# Patient Record
Sex: FEMALE | Race: WHITE
[De-identification: names, ages, dates, MRNs, and addresses within clinical notes are randomized per-mention and may not be internally consistent; named-entity substitution may affect disease eponyms.]

---

## 2017-01-18 ENCOUNTER — HOSPITAL ENCOUNTER (OUTPATIENT)
Age: 72
Discharge: HOME | End: 2017-01-18
Payer: MEDICARE

## 2017-01-18 DIAGNOSIS — Z96.641: ICD-10-CM

## 2017-01-18 DIAGNOSIS — M25.551: Primary | ICD-10-CM

## 2017-03-27 ENCOUNTER — HOSPITAL ENCOUNTER (OUTPATIENT)
Age: 72
Discharge: HOME | End: 2017-03-27
Payer: MEDICARE

## 2017-03-27 DIAGNOSIS — E53.8: Primary | ICD-10-CM

## 2017-03-27 DIAGNOSIS — Z79.899: ICD-10-CM

## 2017-05-23 ENCOUNTER — HOSPITAL ENCOUNTER (OUTPATIENT)
Dept: HOSPITAL 76 - DI.S | Age: 72
Discharge: HOME | End: 2017-05-23
Attending: PODIATRIST
Payer: MEDICARE

## 2017-05-23 DIAGNOSIS — M19.071: Primary | ICD-10-CM

## 2017-05-24 NOTE — XRAY REPORT
RIGHT FOOT, THREE VIEWS:  05/23/2017

 

CLINICAL HISTORY:  Painful right foot.

 

COMPARISON:  None. 

 

FINDINGS:  Prominent bunion deformity is noted at the right 1st MP joint.  Significant narrowing of t
he right 1st MP joint is seen.  Mild spurring is noted along the periarticular margins of the lateral
 aspect of the right 1st MP joint.  Mild narrowing of the proximal and distal interphalangeal joints 
of the 2nd, 3rd, 4th, and 5th toes. 

 

No fracture is seen.

 

Tarsal bones appear normal. 

 

IMPRESSION:  

1.  SIGNIFICANT OSTEOARTHRITIS IS NOTED AT THE RIGHT 1ST MP JOINT WITH ASSOCIATED BUNION DEFORMITY.

 

2.  MINIMAL DEFORMITY IS NOTED ALONG THE LATERAL ASPECT OF THE BASE OF THE DISTAL PHALANX OF THE 1ST 
TOE.  FINDING MOST LIKELY IS A RESULT OF HEALED OLD TRAUMA. 

 

 

 

DD:05/24/2017 08:57:33  DT: 05/24/2017 09:13  JOB #: [id number]  EXT JOB #:[id number]

## 2017-06-01 ENCOUNTER — HOSPITAL ENCOUNTER (EMERGENCY)
Dept: HOSPITAL 76 - ED | Age: 72
Discharge: HOME | End: 2017-06-01
Payer: MEDICARE

## 2017-06-01 VITALS — DIASTOLIC BLOOD PRESSURE: 69 MMHG | SYSTOLIC BLOOD PRESSURE: 130 MMHG

## 2017-06-01 DIAGNOSIS — S62.367A: Primary | ICD-10-CM
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
## 2017-06-01 DIAGNOSIS — S60.222A: ICD-10-CM

## 2017-06-01 DIAGNOSIS — G20: ICD-10-CM

## 2017-06-01 DIAGNOSIS — I10: ICD-10-CM

## 2017-06-01 DIAGNOSIS — S62.645A: ICD-10-CM

## 2017-06-01 DIAGNOSIS — Z86.718: ICD-10-CM

## 2017-06-01 DIAGNOSIS — Z87.891: ICD-10-CM

## 2017-06-01 PROCEDURE — 29125 APPL SHORT ARM SPLINT STATIC: CPT

## 2017-06-01 PROCEDURE — 99283 EMERGENCY DEPT VISIT LOW MDM: CPT

## 2017-06-01 RX ADMIN — IBUPROFEN STA MG: 600 TABLET, FILM COATED ORAL at 18:24

## 2017-06-01 NOTE — XRAY PRELIMINARY REPORT
Accession: [id number]

Exam: XR Hand 3 View LT

 

IMPRESSION: Osteopenia with nondisplaced transverse fractures at the distal metaphysis of the fifth m
etacarpal as well as the proximal phalanx of the left fourth digit.

 

[facility]

 

SITE ID: [id number]

## 2017-06-01 NOTE — XRAY REPORT
EXAM:

LEFT HAND RADIOGRAPHY

 

EXAM DATE: 6/1/2017 04:21 PM.

 

CLINICAL HISTORY: Pain following fall today.

 

COMPARISON: None.

 

TECHNIQUE: 3 views.

 

FINDINGS: 

Bones: Diffuse osteopenia with a transverse fracture at the distal metaphysis of the fifth metacarpal
. Additional nondisplaced transverse fracture base proximal phalanx left fourth digit.

 

Joints: No dislocation. Osteophytes at the first carpometacarpal joint.

 

Soft Tissues: Mild soft tissue swelling.

 

IMPRESSION: Osteopenia with nondisplaced transverse fractures at the distal metaphysis of the fifth m
etacarpal as well as the proximal phalanx of the left fourth digit.

 

[facility]

Referring Provider Line: [phone]

 

SITE ID: [id number]

## 2017-06-01 NOTE — ED PHYSICIAN DOCUMENTATION
PD HPI UPPER EXT INJURY





- Stated complaint


Stated Complaint: L HAND INJURY





- Chief complaint


Chief Complaint: General





- History obtained from


History obtained from: Patient





- History of Present Illness


Location: Left, Hand


Type of injury: Fall (she says she slipped and fell. DId not feel badly prior.)


Timing - onset: Today


Timing - details: Abrupt onset, Still present


Improved by: Rest


Worsened by: Moving, Palpating


Associated symptoms: Swelling (ulnar side of hand).  No: Weakness, Numbness


Similar symptoms before: Has not had sx before


Recently seen: Not recently seen





Review of Systems


Cardiac: denies: Chest pain / pressure


GI: denies: Abdominal Pain


Skin: denies: Abrasion (s), Laceration (s)


Neurologic: denies: Focal weakness, Numbness, Altered mental status, Headache, 

Head injury





PD PAST MEDICAL HISTORY





- Past Medical History


Cardiovascular: Hypertension, Deep vein thrombosis


Respiratory: None


Neuro: Parkinson's


Endocrine/Autoimmune: None


GI: None


: None


HEENT: None


Psych: None


Musculoskeletal: Osteoporosis, Chronic back pain


Derm: Other





- Past Surgical History


Past Surgical History: Yes


Ortho: Hip replacement, Rotator cuff repair





- Present Medications


Home Medications: 


 Ambulatory Orders











 Medication  Instructions  Recorded  Confirmed


 


Carbidopa/Levodopa 25/100 [Sinemet 2 tab PO TID 03/18/15 06/01/17





25 mg/100 mg]   


 


Citalopram [CeleXA] 20 mg PO DAILY 03/18/15 06/01/17


 


Metoprolol Succinate [Toprol Xl] 100 mg PO DAILY 03/18/15 06/01/17


 


Oxybutynin Chloride [Ditropan Xl] 10 mg PO DAILY 03/18/15 06/01/17


 


Calcium Citrate 200 mg PO TID 11/16/16 06/01/17


 


Polyethylene Glycol 3350 [Miralax] 17 gm PO DAILY 11/16/16 06/01/17


 


rOPINIRole [Requip] 2 mg PO TID 11/16/16 06/01/17


 


Rivastigmine [Exelon] 1 cap PO TID 06/01/17 06/01/17














- Allergies


Allergies/Adverse Reactions: 


 Allergies











Allergy/AdvReac Type Severity Reaction Status Date / Time


 


No Known Drug Allergies Allergy   Verified 06/01/17 16:11














- Social History


Does the pt smoke?: No


Smoking Status: Former smoker


Does the pt drink ETOH?: No


Does the pt have substance abuse?: Yes





- Immunizations


Immunizations are current?: Yes





- POLST


Patient has POLST: No





PD ED PE NORMAL





- Vitals


Vital signs reviewed: Yes





- General


General: Alert and oriented X 3, No acute distress, Well developed/nourished





- HEENT


HEENT: Atraumatic





- Neck


Neck: Supple, no meningeal sign, No bony TTP





- Respiratory


Respiratory: Clear bilaterally





- Abdomen


Abdomen: Soft, Non tender





- Derm


Derm: Normal color, Warm and dry





- Extremities


Extremities: Other (left hand with swelling, bruising and tenderness overlying 

the 4th/5th MC and at MCP. Little finger also some tender but no deformity. )





- Neuro


Neuro: No motor deficit, No sensory deficit





Results





- Vitals


Vitals: 


 Oxygen











O2 Source [With Activity]      Room air


 


O2 Source [Without Activity]   Room air


 


O2 Source                      Room air

















- Rads (name of study)


  ** hand 


Radiology: Prelim report reviewed (5th MC fracture without angulation)





Procedures





- Splint (location)


  ** hand


Splint applied by: Tech


Type of splint: Fiberglass, Ulnar gutter


Other: Patient tolerated well, No complications, Neurovascular intact, Good 

alignment, Sling provided





PD MEDICAL DECISION MAKING





- ED course


Complexity details: reviewed results, considered differential, d/w patient





Departure





- Departure


Disposition: 01 Home, Self Care


Clinical Impression: 


Fall from slip, trip, or stumble


Qualifiers:


 Encounter type: initial encounter Qualified Code(s): W01.0XXA - Fall on same 

level from slipping, tripping and stumbling without subsequent striking against 

object, initial encounter





Hand contusion


Qualifiers:


 Encounter type: initial encounter Laterality: left Qualified Code(s): S60.222A 

- Contusion of left hand, initial encounter





Fracture of fifth metacarpal bone of left hand


Qualifiers:


 Encounter type: initial encounter Fracture type: closed Metacarpal location: 

neck Fracture alignment: nondisplaced Qualified Code(s): S62.367A - 

Nondisplaced fracture of neck of fifth metacarpal bone, left hand, initial 

encounter for closed fracture


Condition: Stable


Record reviewed to determine appropriate education?: Yes


Instructions:  ED Fx Boxer


Follow-Up: 


Salome Hoffmann PA-C [Primary Care Provider] - 


Frederick Narvaez MD [Provider Admit Priv/Credential] - 


Comments: 


Ibuprofen 400-600 mg three times daily. Add Tylenol as needed for pains. Splint 

for the hand for likely 4-6 weeks, but it will need to be changed/replaced once 

the swelling goes down in 4-5 days. Call for an appt with PMD or Ortho. Elevate 

and rest hand often to reduce the swelling. 


Discharge Date/Time: 06/01/17 18:50

## 2018-04-16 ENCOUNTER — HOSPITAL ENCOUNTER (OUTPATIENT)
Dept: HOSPITAL 76 - DI | Age: 73
Discharge: HOME | End: 2018-04-16
Attending: INTERNAL MEDICINE
Payer: MEDICARE

## 2018-04-16 DIAGNOSIS — Z96.641: ICD-10-CM

## 2018-04-16 DIAGNOSIS — J44.9: Primary | ICD-10-CM

## 2018-04-16 DIAGNOSIS — M25.551: ICD-10-CM

## 2018-04-16 PROCEDURE — 71046 X-RAY EXAM CHEST 2 VIEWS: CPT

## 2018-04-17 NOTE — XRAY REPORT
RIGHT HIP AND PELVIS:  04/16/2018

 

CLINICAL INDICATION:  Right hip pain.

 

COMPARISON:  01/18/2017.

 

FINDINGS:  Frontal view of the hips and pelvis and frogleg lateral view of the right hip 

demonstrate a total hip replacement in place.  There is no evidence of fracture or hardware 

complication.  No radiopaque foreign body is seen in the soft tissues.

 

IMPRESSION:  STABLE APPEARANCE OF RIGHT HIP REPLACEMENT.

 

 

DD: 04/17/2018 08:44

TD: 04/17/2018 09:31

Job #: [id number]

## 2018-04-17 NOTE — XRAY REPORT
TWO VIEW CHEST:  04/16/2018

 

CLINICAL INDICATION:  Cough for 1 month.

 

COMPARISON:  11/16/2016.

 

FINDINGS:  Frontal and lateral views of the chest demonstrate a normal cardiac silhouette.  The

lungs are hyperinflated, compatible with COPD.  No focal consolidation, effusion, or 

pneumothorax is present.

 

IMPRESSION:  COPD, BUT NO EVIDENCE OF ACUTE CARDIOPULMONARY DISEASE.

 

 

DD: 04/17/2018 08:43

TD: 04/17/2018 09:30

Job #: [id number]

## 2018-07-10 ENCOUNTER — HOSPITAL ENCOUNTER (EMERGENCY)
Dept: HOSPITAL 76 - ED | Age: 73
LOS: 1 days | Discharge: HOME | End: 2018-07-11
Payer: MEDICARE

## 2018-07-10 DIAGNOSIS — Z96.649: ICD-10-CM

## 2018-07-10 DIAGNOSIS — Z87.891: ICD-10-CM
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
## 2018-07-10 DIAGNOSIS — S40.019A: Primary | ICD-10-CM

## 2018-07-10 DIAGNOSIS — Z86.718: ICD-10-CM

## 2018-07-10 DIAGNOSIS — S20.229A: ICD-10-CM

## 2018-07-10 DIAGNOSIS — I10: ICD-10-CM

## 2018-07-10 PROCEDURE — 73000 X-RAY EXAM OF COLLAR BONE: CPT

## 2018-07-10 PROCEDURE — 73030 X-RAY EXAM OF SHOULDER: CPT

## 2018-07-10 PROCEDURE — 72072 X-RAY EXAM THORAC SPINE 3VWS: CPT

## 2018-07-10 PROCEDURE — 99283 EMERGENCY DEPT VISIT LOW MDM: CPT

## 2018-07-10 PROCEDURE — 71046 X-RAY EXAM CHEST 2 VIEWS: CPT

## 2018-07-10 NOTE — XRAY REPORT
Procedure Date:  07/10/2018   

Accession Number:  [id number] / [id number]                    

Procedure:  XR  - Shoulder 3 View RT CPT Code:  

 

FULL RESULT:

 

 

EXAM:

RIGHT SHOULDER RADIOGRAPHY

 

EXAM DATE: 7/10/2018 10:47 PM.

 

CLINICAL HISTORY: Fall. Right shoulder pain.

 

COMPARISON: None.

 

TECHNIQUE: 3 views.

 

FINDINGS:

Bones: Normal. No fracture or bone lesion.

 

Joints: The glenohumeral and acromioclavicular joints are normal.

 

Soft tissues: The visualized hemithorax is unremarkable. No soft tissue 

calcification.

IMPRESSION: Normal shoulder radiography.

 

[facility]

## 2018-07-10 NOTE — XRAY REPORT
Procedure Date:  07/10/2018   

Accession Number:  [id number] / [id number]                    

Procedure:  XR  - Clavicle RT CPT Code:  

 

FULL RESULT:

 

 

EXAM:

RIGHT CLAVICLE RADIOGRAPHY

 

EXAM DATE: 7/10/2018 10:47 PM.

 

CLINICAL HISTORY: Fall. Right shoulder pain.

 

COMPARISON: None.

 

TECHNIQUE: 2 views.

 

FINDINGS:

Bones: Normal. No fracture or bone lesion.

 

Joints: The acromioclavicular and sternoclavicular joints are normal. No 

subluxation.

 

Soft Tissues: Normal. No soft tissue calcification.

IMPRESSION: Normal clavicle radiography.

 

[facility]

## 2018-07-11 VITALS — SYSTOLIC BLOOD PRESSURE: 120 MMHG | DIASTOLIC BLOOD PRESSURE: 66 MMHG

## 2018-07-11 NOTE — XRAY REPORT
Procedure Date:  07/11/2018   

Accession Number:  [id number] / [id number]                    

Procedure:  XR  - Thoracic Spine 3 View CPT Code:  

 

FULL RESULT:

 

 

EXAM:

THORACIC SPINE RADIOGRAPHY

 

EXAM DATE: 7/11/2018 12:38 AM.

 

CLINICAL HISTORY: Fall, back contusion.

 

COMPARISON: CHEST 2 VIEW 04/16/2018.

 

TECHNIQUE: 3 views.

 

FINDINGS:

Alignment: Normal. No spondylolisthesis or scoliosis.

 

Bones: Osteopenia. No fractures or bone lesions.

 

Disks: Mild diffuse degenerative changes.

 

Soft Tissues: Normal. The visualized lungs and cardiomediastinal 

silhouette are normal.

IMPRESSION:

Negative thoracic spine in this mildly osteopenic patient. If there is 

continued clinical concern, CT or MRI suggested.

 

[facility]

## 2018-07-11 NOTE — ED PHYSICIAN DOCUMENTATION
History of Present Illness





- Stated complaint


Stated Complaint: GLF/RT SHOULDER/COLLAR BONE INJ





- Chief complaint


Chief Complaint: Trauma Ext





- History obtained from


History obtained from: Patient, Family





- History of Present Illness


Timing: Today





- Additonal information


Additional information: 





72-year-old female presents the emergency department with complaints of a 

shoulder injury and upper back injury after falling while getting out of the 

shower.  The patient denies injury to her head, neck, abdomen or lower 

extremities.  Symptoms are described as moderate.  No other associated 

symptoms.  No relieving factors.  No attempts at symptom management.  The 

patient denies syncope, chest pain or palpitations before the event or after 

the event.





Review of Systems


Constitutional: denies: Fever


Eyes: denies: Decreased vision


Ears: denies: Ear pain


Nose: denies: Congestion


Cardiac: denies: Chest pain / pressure


Respiratory: denies: Dyspnea


GI: denies: Abdominal Pain


Skin: denies: Laceration (s)


Musculoskeletal: reports: Back pain, Joint pain, Joint swelling


Neurologic: denies: Generalized weakness, Syncope, Head injury





PD PAST MEDICAL HISTORY





- Past Medical History


Cardiovascular: Hypertension, Deep vein thrombosis


Respiratory: None


Endocrine/Autoimmune: None


GI: None


: None


HEENT: None


Psych: None


Musculoskeletal: Osteoporosis, Chronic back pain


Derm: Other





- Past Surgical History


Past Surgical History: Yes


Ortho: Hip replacement, Rotator cuff repair





- Present Medications


Home Medications: 


 Ambulatory Orders











 Medication  Instructions  Recorded  Confirmed


 


Carbidopa/Levodopa 25/100 [Sinemet 2 tab PO TID 03/18/15 06/01/17





25 mg/100 mg]   


 


Citalopram [CeleXA] 20 mg PO DAILY 03/18/15 06/01/17


 


Metoprolol Succinate [Toprol Xl] 100 mg PO DAILY 03/18/15 06/01/17


 


Oxybutynin Chloride [Ditropan Xl] 10 mg PO DAILY 03/18/15 06/01/17


 


Calcium Citrate 200 mg PO TID 11/16/16 06/01/17


 


Polyethylene Glycol 3350 [Miralax] 17 gm PO DAILY 11/16/16 06/01/17


 


rOPINIRole [Requip] 2 mg PO TID 11/16/16 06/01/17


 


Rivastigmine [Exelon] 1 cap PO TID 06/01/17 06/01/17














- Allergies


Allergies/Adverse Reactions: 


 Allergies











Allergy/AdvReac Type Severity Reaction Status Date / Time


 


No Known Drug Allergies Allergy   Verified 06/01/17 16:11














- Social History


Does the pt smoke?: No


Smoking Status: Former smoker


Does the pt drink ETOH?: No


Does the pt have substance abuse?: Yes





- Immunizations


Immunizations are current?: Yes





- POLST


Patient has POLST: No





PD ED PE NORMAL





- General


General: Alert and oriented X 3, No acute distress





- HEENT


HEENT: Atraumatic, PERRL, EOMI, Ears normal





- Neck


Neck: Supple, no meningeal sign





- Cardiac


Cardiac: RRR, Strong equal pulses





- Respiratory


Respiratory: No respiratory distress, Clear bilaterally





- Extremities


Extremities: No deformity





- Neuro


Neuro: Alert and oriented X 3, No motor deficit, Normal speech


Eye Opening: Spontaneous


Motor: Obeys Commands


Verbal: Oriented


GCS Score: 15





- Psych


Psych: Normal mood





PD ED PE EXPANDED





- Back


Back: Soft tissue tenderness.  No: Limited ROM


Back visual: 


  __________________________














  __________________________





 1 - bruising








- Extremities


Extremities: Tenderness, Right shoulder (There is no evidence of deformity, 

crepitus, contusion or obvious dislocation.  No laceration)





Results





- Vitals


Vitals: 


 Vital Signs - 24 hr











  07/10/18 07/11/18 07/11/18





  21:35 00:00 01:36


 


Temperature 36.3 C L  36.5 C


 


Heart Rate 66 82 81


 


Respiratory 18 17 15





Rate   


 


Blood Pressure 159/71 H 136/74 H 120/66


 


O2 Saturation 96 99 97








 Oxygen











O2 Source [With Activity]      Room air


 


O2 Source [Without Activity]   Room air


 


O2 Source                      Room air

















- Rads (name of study)


  ** Chest x-ray/shoulder x-ray/clavicle/thoracic spine


Radiology: Final report received, Other (No fracture identified on any of the x-

rays)





PD MEDICAL DECISION MAKING





- ED course


ED course: 





The patient has no acute fracture seen on her imaging, the patient appears 

appropriate for discharge home in ongoing outpatient management.  I discussed 

with her the findings and plan she understands and agrees.  I discussed warning 

signs and recommended returning to the emergency department immediately for 

worsening or any concerns.





- Sepsis Event


Vital Signs: 


 Vital Signs - 24 hr











  07/10/18 07/11/18 07/11/18





  21:35 00:00 01:36


 


Temperature 36.3 C L  36.5 C


 


Heart Rate 66 82 81


 


Respiratory 18 17 15





Rate   


 


Blood Pressure 159/71 H 136/74 H 120/66


 


O2 Saturation 96 99 97








 Oxygen











O2 Source [With Activity]      Room air


 


O2 Source [Without Activity]   Room air


 


O2 Source                      Room air

















Departure





- Departure


Disposition: 01 Home, Self Care


Clinical Impression: 


Shoulder contusion


Qualifiers:


 Encounter type: initial encounter Laterality: unspecified laterality Qualified 

Code(s): S40.019A - Contusion of unspecified shoulder, initial encounter





Back contusion


Qualifiers:


 Encounter type: initial encounter Laterality: unspecified laterality Qualified 

Code(s): S20.229A - Contusion of unspecified back wall of thorax, initial 

encounter





Condition: Good


Instructions:  ED Contusion Back, ED Contusion Upper Ext, ED Wound Care


Follow-Up: 


Dudley Carpio MD [Primary Care Provider] - Within 1 week


Comments: 


Please return to the emergency department for worsening symptoms or any concerns


Discharge Date/Time: 07/11/18 01:36

## 2018-07-11 NOTE — XRAY REPORT
Procedure Date:  07/11/2018   

Accession Number:  [id number] / [id number]                    

Procedure:  XR  - Chest 2 View X-Ray CPT Code:  51354

 

FULL RESULT:

 

 

EXAM:

CHEST RADIOGRAPHY

 

EXAM DATE: 7/11/2018 12:37 AM.

 

CLINICAL HISTORY: Fall, back contusion.

 

COMPARISON: CLAVICLE RT 07/10/2018, THORACIC SPINE 3 VIEW 07/11/2018, 

CHEST 2 VIEW 04/16/2018.

 

TECHNIQUE: 2 views.

 

FINDINGS:

Lungs/Pleura: Large volumes. No focal pneumonia or overt edema. No 

pneumothorax or effusion.

 

Mediastinum: Within exam limitations, cardiomediastinal contour is normal.

 

Other: No definite acute osseous abnormality seen.

 

IMPRESSION:

COPD without acute process seen in the chest.

 

[facility]

## 2018-07-24 ENCOUNTER — HOSPITAL ENCOUNTER (OUTPATIENT)
Dept: HOSPITAL 76 - DI | Age: 73
Discharge: HOME | End: 2018-07-24
Attending: PHYSICIAN ASSISTANT
Payer: MEDICARE

## 2018-07-24 DIAGNOSIS — M19.011: ICD-10-CM

## 2018-07-24 DIAGNOSIS — S46.111A: ICD-10-CM

## 2018-07-24 DIAGNOSIS — S46.011A: Primary | ICD-10-CM

## 2018-07-25 NOTE — MRI REPORT
Procedure Date:  07/24/2018   

Accession Number:  [id number] / [id number]                    

Procedure:  MRI - Shoulder RT W/O CPT Code:  

 

FULL RESULT:

 

 

EXAM:

RIGHT SHOULDER MRI WITHOUT CONTRAST

 

EXAM DATE: 7/24/2018 03:10 PM.

 

CLINICAL HISTORY: SHOULDER JOINT PAIN, RIGHT, FALL W/TRAUMA 7-10-18.

 

COMPARISON: 07/10/2018 right shoulder radiographs.

 

TECHNIQUE: Multiplanar, multisequence T1-weighted and fluid-sensitive 

sequences of the shoulder without contrast. Other: None.

 

FINDINGS:

Acromioclavicular Region: The acromion is type II. Mild acromioclavicular 

joint degenerative changes with subchondral cystic change and capsular 

hypertrophy. The coracoacromial and coracoclavicular ligaments are 

intact. There is fluid within the subacromial-subdeltoid bursa.

 

Glenohumeral Region: There is cranial subluxation of the humeral head on 

the glenoid with narrowing of the subacromial space. Moderate effusion 

with synovitis. The articular cartilage is unremarkable. The glenohumeral 

ligaments and joint capsule are unremarkable.

 

Bone Marrow: There is cystic change within the greater tuberosity of the 

humerus. Marrow signal is otherwise normal. No osseous lesions. No 

fractures.

 

Labrum: The labrum is unremarkable on this nonarthrographic study.

 

Musculature/Rotator Cuff: There is a full-thickness, full width tear of 

the infraspinatus tendon with retraction medial to the supraglenoid 

tubercle and associated intramuscular edema. There is also a 

full-thickness, near full width tear of the supraspinatus tendon, with 

remaining intact anterior fibers and retraction of the torn tendon fibers 

to the level of the supraglenoid tubercle. There is shallow 

partial-thickness articular sided tearing of the cranial fibers of the 

subscapularis tendon. The teres minor tendon is intact but demonstrates 

mild intramuscular edema. No atrophy.

 

Biceps Tendon: There is a longitudinal split tear of the intra-articular 

and extra-articular long head biceps tendon.

 

Other: The subcutaneous tissues are unremarkable.

IMPRESSION:

1. Full-thickness, full width tear of the infraspinatus tendon and 

full-thickness, near full width tear of the supraspinatus tendon with 

retraction to the level of the supraglenoid tubercle and associated 

intramuscular edema.

2. Shallow partial-thickness articular sided tear of the cranial fibers 

of the subscapularis tendon.

3. Longitudinal split tear of the biceps tendon.

4. Mild acromioclavicular joint osteoarthritis.

 

[facility] MUSCULOSKELETAL RADIOLOGY SECTION

## 2018-11-06 ENCOUNTER — HOSPITAL ENCOUNTER (OUTPATIENT)
Dept: HOSPITAL 76 - DI | Age: 73
Discharge: HOME | End: 2018-11-06
Attending: PSYCHIATRY & NEUROLOGY
Payer: MEDICARE

## 2018-11-06 DIAGNOSIS — G20: Primary | ICD-10-CM

## 2018-11-06 DIAGNOSIS — R41.3: ICD-10-CM

## 2018-11-06 PROCEDURE — 70450 CT HEAD/BRAIN W/O DYE: CPT

## 2018-11-06 NOTE — CT REPORT
Reason:  PARKINSONISM,UNSPECIFIED,MEMORY LOSS

Procedure Date:  11/06/2018   

Accession Number:  [id number] / [id number]                    

Procedure:  CT  - Head W/O CPT Code:  

 

FULL RESULT:

 

 

EXAM:

CT HEAD

 

EXAM DATE: 11/6/2018 02:55 PM.

 

CLINICAL HISTORY: PARKINSONISM,UNSPECIFIED,MEMORY LOSS.

 

COMPARISON: BRAIN W/WO 07/02/2014 1:01 PM.

 

TECHNIQUE: Multiaxial CT images were obtained from the foramen magnum to 

the vertex. Reformats: Sagittal and coronal. IV contrast: None.

 

In accordance with CT protocol optimization, one or more of the following 

dose reduction techniques were utilized for this exam: automated exposure 

control, adjustment of mA and/or KV based on patient size, or use of 

iterative reconstructive technique.

 

FINDINGS:

Parenchyma: No intraparenchymal hemorrhage. No evidence of mass, midline 

shift, or CT findings of infarction. Gray-white differentiation is 

distinct.

 

Extraaxial Spaces: Normal for age. No subdural or epidural collections 

identified.

 

Ventricles: Normal in size and position.

 

Sinuses and Orbits: Imaged paranasal sinuses, orbits, and mastoids show 

no significant abnormality.

 

Bones: No evidence of fracture or calvarial defect.

 

Other: None.

IMPRESSION: Stable negative head CT.

 

[facility]

## 2018-11-27 ENCOUNTER — HOSPITAL ENCOUNTER (EMERGENCY)
Dept: HOSPITAL 76 - ED | Age: 73
Discharge: HOME | End: 2018-11-27
Payer: MEDICARE

## 2018-11-27 VITALS — DIASTOLIC BLOOD PRESSURE: 68 MMHG | SYSTOLIC BLOOD PRESSURE: 165 MMHG

## 2018-11-27 DIAGNOSIS — M81.8: ICD-10-CM

## 2018-11-27 DIAGNOSIS — Z96.641: ICD-10-CM

## 2018-11-27 DIAGNOSIS — S70.01XA: Primary | ICD-10-CM
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
## 2018-11-27 DIAGNOSIS — Z86.718: ICD-10-CM

## 2018-11-27 DIAGNOSIS — I10: ICD-10-CM

## 2018-11-27 DIAGNOSIS — Z87.891: ICD-10-CM

## 2018-11-27 PROCEDURE — 99283 EMERGENCY DEPT VISIT LOW MDM: CPT

## 2018-11-27 NOTE — ED PHYSICIAN DOCUMENTATION
PD HPI LOWER EXT INJURY





- Stated complaint


Stated Complaint: HIP PX/GLF





- Chief complaint


Chief Complaint: Ext Problem





- History obtained from


History obtained from: Patient





- History of Present Illness


PD HPI LOW EXT INJURY LOCATION: Other (74yo woman with parkinsons fell 3 days 

ago, feet got caught onto something. Fell onto R hip which was replaced 3 years 

ago. Pain is severe with walking but mild when at rest.)





Review of Systems


Constitutional: reports: Reviewed and negative


Cardiac: reports: Reviewed and negative


Respiratory: reports: Reviewed and negative


GI: reports: Reviewed and negative


: reports: Reviewed and negative





PD PAST MEDICAL HISTORY





- Past Medical History


Cardiovascular: Hypertension, Deep vein thrombosis


Respiratory: None


Endocrine/Autoimmune: None


GI: None


: None


HEENT: None


Psych: None


Musculoskeletal: Osteoporosis, Chronic back pain


Derm: Other





- Past Surgical History


Past Surgical History: Yes


Ortho: Hip replacement, Rotator cuff repair





- Present Medications


Home Medications: 


                                Ambulatory Orders











 Medication  Instructions  Recorded  Confirmed


 


Carbidopa/Levodopa 25/100 [Sinemet 2 tab PO TID 03/18/15 11/27/18





25 mg/100 mg]   


 


Citalopram [CeleXA] 20 mg PO DAILY 03/18/15 11/27/18


 


Metoprolol Succinate [Toprol Xl] 100 mg PO DAILY 03/18/15 11/27/18


 


Oxybutynin Chloride [Ditropan Xl] 10 mg PO DAILY 03/18/15 11/27/18


 


Calcium Citrate 200 mg PO TID 11/16/16 11/27/18


 


Polyethylene Glycol 3350 [Miralax] 17 gm PO DAILY 11/16/16 11/27/18


 


rOPINIRole [Requip] 2 mg PO TID 11/16/16 11/27/18


 


Rivastigmine [Exelon] 1 cap PO TID 06/01/17 11/27/18














- Allergies


Allergies/Adverse Reactions: 


                                    Allergies











Allergy/AdvReac Type Severity Reaction Status Date / Time


 


No Known Drug Allergies Allergy   Verified 11/27/18 15:07














- Social History


Does the pt smoke?: No


Smoking Status: Former smoker


Does the pt drink ETOH?: No


Does the pt have substance abuse?: Yes





- Immunizations


Immunizations are current?: Yes





- POLST


Patient has POLST: No





PD ED PE NORMAL





- Vitals


Vital signs reviewed: Yes





- General


General: Alert and oriented X 3, No acute distress





- Derm


Derm: Normal color, Warm and dry





- Extremities


Extremities: No calf tenderness / cord, Other (She seems to walk and bear weight

 without overt tenderness, there is a seemingly subcutaneous lump just posterior

 to the greater trochanter that is tender, most consistent with a subcutaneous 

hematoma.)





- Neuro


Neuro: Alert and oriented X 3, Normal speech





Results





- Vitals


Vitals: 


                               Vital Signs - 24 hr











  11/27/18





  15:04


 


Temperature 36.9 C


 


Heart Rate 74


 


Respiratory 18





Rate 


 


Blood Pressure 160/84 H


 


O2 Saturation 100








                                     Oxygen











O2 Source [With Activity]      Room air


 


O2 Source [Without Activity]   Room air


 


O2 Source                      Room air

















- Rads (name of study)


  ** R hip XR


Radiology: EMP read contemporaneously (NAD)





Departure





- Departure


Disposition: 01 Home, Self Care


Clinical Impression: 


Fall from slip, trip, or stumble


Qualifiers:


 Encounter type: initial encounter Qualified Code(s): W01.0XXA - Fall on same 

level from slipping, tripping and stumbling without subsequent striking against 

object, initial encounter





Contusion of right hip


Qualifiers:


 Encounter type: initial encounter Qualified Code(s): S70.01XA - Contusion of 

right hip, initial encounter





Condition: Good


Record reviewed to determine appropriate education?: Yes


Instructions:  ED Contusion Hip


Comments: 


Your blood pressure was elevated today on check into the emergency department.  

This does not mean that you have hypertension, it is a common phenomenon to come

to the emergency department and have elevated blood pressure.  I recommend that 

you see your primary care physician within the week to have it rechecked when 

you are feeling better.

## 2018-11-27 NOTE — XRAY REPORT
Reason:  hip inj

Procedure Date:  11/27/2018   

Accession Number:  [id number] / [id number]                    

Procedure:  XR  - Hip w/Pelvis 2-3V RT CPT Code:  

 

FULL RESULT:

 

 

EXAM:

RIGHT HIP AND PELVIS RADIOGRAPHY

 

EXAM DATE: 11/27/2018 04:35 PM.

 

HISTORY: Hip inj.

 

COMPARISONS: 04/16/2018.

 

TECHNIQUE: 1 view of the pelvis and 1 view of the hip.

 

FINDINGS:

A right total hip prosthesis projects in similar position. No acute 

fracture. Heterotopic ossification adjacent to the right greater 

trochanter is unchanged. No dislocation. Moderate to severe lower lumbar 

disk degeneration.

IMPRESSION: No acute osseus abnormality.

 

[facility]

## 2018-12-05 ENCOUNTER — HOSPITAL ENCOUNTER (OUTPATIENT)
Dept: HOSPITAL 76 - DI | Age: 73
Discharge: HOME | End: 2018-12-05
Attending: PHYSICIAN ASSISTANT
Payer: MEDICARE

## 2018-12-05 DIAGNOSIS — M41.86: Primary | ICD-10-CM

## 2018-12-05 PROCEDURE — 72110 X-RAY EXAM L-2 SPINE 4/>VWS: CPT

## 2018-12-06 NOTE — XRAY REPORT
Reason:  BACK PAIN,LUMBAR,CHRONIC

Procedure Date:  12/05/2018   

Accession Number:  [id number] / [id number]                    

Procedure:  XR  - Lumbar Spine Complete CPT Code:  

 

FULL RESULT:

 

 

EXAM:

LUMBOSACRAL SPINE RADIOGRAPHY

 

EXAM DATE: 12/5/2018 04:51 PM.

 

CLINICAL HISTORY: Back pain, lumbar, chronic.

 

COMPARISONS: None.

 

TECHNIQUE: 5 views.

 

FINDINGS:

Alignment: Dextroconvex lumbar scoliosis centered about L3.

 

Bones: Five non-rib-bearing lumbar vertebral bodies are present. No 

fractures or bone lesions.

 

Disks: Multilevel loss of disk space height with marginal osteophytosis, 

most pronounced at L5-S1 and L2-L3.

 

Facets: L5 pars defect with severe facet arthropathy at this level.

 

Sacroiliac Joints: Unremarkable.

 

Soft Tissues: Normal. The visualized bowel gas pattern is normal.

IMPRESSION:

Degenerative scoliosis and L5 pars defect.

 

 

[facility]

## 2018-12-27 ENCOUNTER — HOSPITAL ENCOUNTER (OUTPATIENT)
Dept: HOSPITAL 76 - LAB.F | Age: 73
Discharge: HOME | End: 2018-12-27
Attending: PHYSICIAN ASSISTANT
Payer: MEDICARE

## 2018-12-27 DIAGNOSIS — M25.511: ICD-10-CM

## 2018-12-27 DIAGNOSIS — E78.2: ICD-10-CM

## 2018-12-27 DIAGNOSIS — I10: ICD-10-CM

## 2018-12-27 DIAGNOSIS — R73.9: ICD-10-CM

## 2018-12-27 DIAGNOSIS — G20: ICD-10-CM

## 2018-12-27 DIAGNOSIS — E53.8: Primary | ICD-10-CM

## 2018-12-27 DIAGNOSIS — R41.3: ICD-10-CM

## 2018-12-27 DIAGNOSIS — Z79.899: ICD-10-CM

## 2018-12-27 LAB
ALBUMIN DIAFP-MCNC: 3.9 G/DL (ref 3.2–5.5)
ALBUMIN/GLOB SERPL: 1.3 {RATIO} (ref 1–2.2)
ALP SERPL-CCNC: 103 IU/L (ref 42–121)
ALT SERPL W P-5'-P-CCNC: 11 IU/L (ref 10–60)
ANION GAP SERPL CALCULATED.4IONS-SCNC: 8 MMOL/L (ref 6–13)
AST SERPL W P-5'-P-CCNC: 17 IU/L (ref 10–42)
BASOPHILS NFR BLD AUTO: 0.2 10^3/UL (ref 0–0.1)
BASOPHILS NFR BLD AUTO: 2.1 %
BILIRUB BLD-MCNC: 0.7 MG/DL (ref 0.2–1)
BUN SERPL-MCNC: 13 MG/DL (ref 6–20)
CALCIUM UR-MCNC: 8.8 MG/DL (ref 8.5–10.3)
CHLORIDE SERPL-SCNC: 103 MMOL/L (ref 101–111)
CHOLEST SERPL-MCNC: 167 MG/DL
CO2 SERPL-SCNC: 28 MMOL/L (ref 21–32)
CREAT SERPLBLD-SCNC: 0.9 MG/DL (ref 0.4–1)
EOSINOPHIL # BLD AUTO: 0.2 10^3/UL (ref 0–0.7)
EOSINOPHIL NFR BLD AUTO: 1.9 %
ERYTHROCYTE [DISTWIDTH] IN BLOOD BY AUTOMATED COUNT: 15.4 % (ref 12–15)
EST. AVERAGE GLUCOSE BLD GHB EST-MCNC: 117 MG/DL (ref 70–100)
GFRSERPLBLD MDRD-ARVRAT: 61 ML/MIN/{1.73_M2} (ref 89–?)
GLOBULIN SER-MCNC: 3.1 G/DL (ref 2.1–4.2)
GLUCOSE SERPL-MCNC: 108 MG/DL (ref 70–100)
HB2 TOTAL: 13 G/DL
HBA1C BLD-MCNC: 0.51 G/DL
HDLC SERPL-MCNC: 54 MG/DL
HDLC SERPL: 3.1 {RATIO} (ref ?–4.4)
HEMOGLOBIN A1C %: 5.7 % (ref 4.6–6.2)
HGB UR QL STRIP: 12.5 G/DL (ref 12–16)
LDLC SERPL CALC-MCNC: 100 MG/DL
LDLC/HDLC SERPL: 1.9 {RATIO} (ref ?–4.4)
LYMPHOCYTES # SPEC AUTO: 1.4 10^3/UL (ref 1.5–3.5)
LYMPHOCYTES NFR BLD AUTO: 14 %
MCH RBC QN AUTO: 30.1 PG (ref 27–31)
MCHC RBC AUTO-ENTMCNC: 32.3 G/DL (ref 32–36)
MCV RBC AUTO: 93.3 FL (ref 81–99)
MONOCYTES # BLD AUTO: 0.8 10^3/UL (ref 0–1)
MONOCYTES NFR BLD AUTO: 7.7 %
NEUTROPHILS # BLD AUTO: 7.5 10^3/UL (ref 1.5–6.6)
NEUTROPHILS # SNV AUTO: 10 X10^3/UL (ref 4.8–10.8)
NEUTROPHILS NFR BLD AUTO: 74.3 %
PDW BLD AUTO: 7 FL (ref 7.9–10.8)
PLATELET # BLD: 368 10^3/UL (ref 130–450)
PROT SPEC-MCNC: 7 G/DL (ref 6.7–8.2)
RBC MAR: 4.14 10^6/UL (ref 4.2–5.4)
SODIUM SERPLBLD-SCNC: 139 MMOL/L (ref 135–145)
TSH SERPL-ACNC: 1.13 UIU/ML (ref 0.34–5.6)
VLDLC SERPL-SCNC: 13 MG/DL

## 2018-12-27 PROCEDURE — 84165 PROTEIN E-PHORESIS SERUM: CPT

## 2018-12-27 PROCEDURE — 85025 COMPLETE CBC W/AUTO DIFF WBC: CPT

## 2018-12-27 PROCEDURE — 83721 ASSAY OF BLOOD LIPOPROTEIN: CPT

## 2018-12-27 PROCEDURE — 80053 COMPREHEN METABOLIC PANEL: CPT

## 2018-12-27 PROCEDURE — 84443 ASSAY THYROID STIM HORMONE: CPT

## 2018-12-27 PROCEDURE — 84155 ASSAY OF PROTEIN SERUM: CPT

## 2018-12-27 PROCEDURE — 80061 LIPID PANEL: CPT

## 2018-12-27 PROCEDURE — 83036 HEMOGLOBIN GLYCOSYLATED A1C: CPT

## 2018-12-27 PROCEDURE — 82607 VITAMIN B-12: CPT

## 2018-12-27 PROCEDURE — 36415 COLL VENOUS BLD VENIPUNCTURE: CPT

## 2019-01-03 ENCOUNTER — HOSPITAL ENCOUNTER (OUTPATIENT)
Dept: HOSPITAL 76 - LAB.R | Age: 74
Discharge: HOME | End: 2019-01-03
Attending: PHYSICIAN ASSISTANT
Payer: MEDICARE

## 2019-01-03 DIAGNOSIS — R68.89: Primary | ICD-10-CM

## 2019-01-03 DIAGNOSIS — R89.9: ICD-10-CM

## 2019-01-03 PROCEDURE — 85025 COMPLETE CBC W/AUTO DIFF WBC: CPT

## 2019-01-04 LAB
BASOPHILS NFR BLD AUTO: 0.3 10^3/UL (ref 0–0.1)
BASOPHILS NFR BLD AUTO: 3 %
EOSINOPHIL # BLD AUTO: 0.2 10^3/UL (ref 0–0.7)
EOSINOPHIL NFR BLD AUTO: 1.5 %
ERYTHROCYTE [DISTWIDTH] IN BLOOD BY AUTOMATED COUNT: 15 % (ref 12–15)
HGB UR QL STRIP: 12.1 G/DL (ref 12–16)
LYMPHOCYTES # SPEC AUTO: 1.5 10^3/UL (ref 1.5–3.5)
LYMPHOCYTES NFR BLD AUTO: 14.5 %
MCH RBC QN AUTO: 30.2 PG (ref 27–31)
MCHC RBC AUTO-ENTMCNC: 32.6 G/DL (ref 32–36)
MCV RBC AUTO: 92.7 FL (ref 81–99)
MONOCYTES # BLD AUTO: 0.8 10^3/UL (ref 0–1)
MONOCYTES NFR BLD AUTO: 8.1 %
NEUTROPHILS # BLD AUTO: 7.5 10^3/UL (ref 1.5–6.6)
NEUTROPHILS # SNV AUTO: 10.3 X10^3/UL (ref 4.8–10.8)
NEUTROPHILS NFR BLD AUTO: 72.9 %
PDW BLD AUTO: 7.1 FL (ref 7.9–10.8)
PLATELET # BLD: 395 10^3/UL (ref 130–450)
RBC MAR: 3.99 10^6/UL (ref 4.2–5.4)

## 2019-01-25 ENCOUNTER — HOSPITAL ENCOUNTER (OUTPATIENT)
Dept: HOSPITAL 76 - EMS | Age: 74
Discharge: HOME | End: 2019-01-25
Attending: SURGERY
Payer: MEDICARE

## 2019-01-25 ENCOUNTER — HOSPITAL ENCOUNTER (EMERGENCY)
Dept: HOSPITAL 76 - ED | Age: 74
LOS: 1 days | Discharge: HOME | End: 2019-01-26
Payer: MEDICARE

## 2019-01-25 DIAGNOSIS — Z87.891: ICD-10-CM

## 2019-01-25 DIAGNOSIS — Z96.649: ICD-10-CM

## 2019-01-25 DIAGNOSIS — R56.9: Primary | ICD-10-CM

## 2019-01-25 DIAGNOSIS — G20: ICD-10-CM

## 2019-01-25 DIAGNOSIS — I10: ICD-10-CM

## 2019-01-25 DIAGNOSIS — Z86.718: ICD-10-CM

## 2019-01-25 DIAGNOSIS — R25.8: Primary | ICD-10-CM

## 2019-01-25 PROCEDURE — 70450 CT HEAD/BRAIN W/O DYE: CPT

## 2019-01-25 PROCEDURE — 85025 COMPLETE CBC W/AUTO DIFF WBC: CPT

## 2019-01-25 PROCEDURE — 81003 URINALYSIS AUTO W/O SCOPE: CPT

## 2019-01-25 PROCEDURE — 83690 ASSAY OF LIPASE: CPT

## 2019-01-25 PROCEDURE — 87086 URINE CULTURE/COLONY COUNT: CPT

## 2019-01-25 PROCEDURE — 36415 COLL VENOUS BLD VENIPUNCTURE: CPT

## 2019-01-25 PROCEDURE — 99284 EMERGENCY DEPT VISIT MOD MDM: CPT

## 2019-01-25 PROCEDURE — 93005 ELECTROCARDIOGRAM TRACING: CPT

## 2019-01-25 PROCEDURE — 81001 URINALYSIS AUTO W/SCOPE: CPT

## 2019-01-25 PROCEDURE — 80053 COMPREHEN METABOLIC PANEL: CPT

## 2019-01-25 PROCEDURE — 84484 ASSAY OF TROPONIN QUANT: CPT

## 2019-01-25 NOTE — ED PHYSICIAN DOCUMENTATION
History of Present Illness





- Stated complaint


Stated Complaint: SEIZURE





- Chief complaint


Chief Complaint: Neuro





- History obtained from


History obtained from: Patient, Family, EMS





- History of Present Illness


Timing: Today


Pain level max: 0


Pain level now: 0


Improved by: nothing


Worsened by: nothing





- Additonal information


Additional information: 





73 year old female with Parkinsons disease, was sitting on the couch tonight 

when she had a shaking episode that lasted approx 15-30 seconds. no postictal 

state per EMS. Patient does not recall event.  No changes to her medications. no

similar symptoms in the past. Does not know who her neurologist is. 





Review of Systems


Ten Systems: 10 systems reviewed and negative


Constitutional: denies: Fever, Chills


Ears: denies: Ear pain


Nose: denies: Rhinorrhea / runny nose, Congestion


Throat: denies: Sore throat


Cardiac: denies: Chest pain / pressure, Palpitations


Respiratory: denies: Cough


GI: denies: Abdominal Pain, Nausea, Vomiting


: denies: Dysuria


Skin: denies: Rash


Musculoskeletal: denies: Neck pain, Back pain


Neurologic: denies: Headache





PD PAST MEDICAL HISTORY





- Past Medical History


Cardiovascular: Hypertension, Deep vein thrombosis


Respiratory: None


Neuro: Parkinson's


Endocrine/Autoimmune: None


GI: None


: None


HEENT: None


Psych: None


Musculoskeletal: Osteoporosis, Chronic back pain


Derm: Other





- Past Surgical History


Past Surgical History: Yes


Ortho: Hip replacement, Rotator cuff repair





- Present Medications


Home Medications: 


                                Ambulatory Orders











 Medication  Instructions  Recorded  Confirmed


 


Carbidopa/Levodopa 25/100 [Sinemet 2 tab PO TID 03/18/15 01/25/19





25 mg/100 mg]   


 


Citalopram [CeleXA] 20 mg PO DAILY 03/18/15 01/25/19


 


Metoprolol Succinate [Toprol Xl] 100 mg PO DAILY 03/18/15 01/25/19


 


Oxybutynin Chloride [Ditropan Xl] 10 mg PO DAILY 03/18/15 01/25/19


 


Calcium Citrate 200 mg PO TID 11/16/16 01/25/19


 


Polyethylene Glycol 3350 [Miralax] 17 gm PO DAILY 11/16/16 01/25/19


 


rOPINIRole [Requip] 2 mg PO TID 11/16/16 01/25/19


 


Rivastigmine [Exelon] 1 cap PO TID 06/01/17 01/25/19














- Allergies


Allergies/Adverse Reactions: 


                                    Allergies











Allergy/AdvReac Type Severity Reaction Status Date / Time


 


No Known Drug Allergies Allergy   Verified 01/25/19 23:32














- Social History


Does the pt smoke?: No


Smoking Status: Former smoker


Does the pt drink ETOH?: No


Does the pt have substance abuse?: Yes





- Immunizations


Immunizations are current?: Yes





- POLST


Patient has POLST: No





PD ED PE NORMAL





- Vitals


Vital signs reviewed: Yes





- General


General: Alert and oriented X 3, No acute distress, Well developed/nourished





- HEENT


HEENT: Atraumatic, PERRL, EOMI, Ears normal, Moist mucous membranes, Pharynx 

benign





- Neck


Neck: Supple, no meningeal sign, No bony TTP





- Cardiac


Cardiac: RRR, Strong equal pulses





- Respiratory


Respiratory: No respiratory distress, Clear bilaterally





- Abdomen


Abdomen: Soft, Non tender, Non distended





- Back


Back: No spinal TTP





- Derm


Derm: Warm and dry





- Extremities


Extremities: No edema, No calf tenderness / cord





- Neuro


Neuro: Alert and oriented X 3, CNs 2-12 intact, No motor deficit, No sensory 

deficit, Normal speech


Eye Opening: Spontaneous


Motor: Obeys Commands


Verbal: Oriented


GCS Score: 15





- Psych


Psych: Normal mood, Normal affect





Results





- Vitals


Vitals: 


                               Vital Signs - 24 hr











  01/25/19 01/25/19 01/26/19





  23:28 23:34 00:20


 


Temperature 36.4 C L  


 


Heart Rate 80 67 67


 


Respiratory 17 17 18





Rate   


 


Blood Pressure 149/70 H 155/65 H 112/54 L


 


O2 Saturation 100 100 98














  01/26/19





  01:27


 


Temperature 


 


Heart Rate 69


 


Respiratory 17





Rate 


 


Blood Pressure 115/56 L


 


O2 Saturation 97








                                     Oxygen











O2 Source []                   Room air


 


O2 Source []                   Room air


 


O2 Source                      Room air

















- EKG (time done)


  ** 2333


Rate: Rate (enter#) (68)


Rhythm: NSR


Axis: Normal


Intervals: Normal TX


QRS: Normal


Ischemia: Normal ST segments





- Labs


Labs: 


                                Laboratory Tests











  01/25/19 01/25/19 01/25/19





  23:45 23:45 23:45


 


WBC  9.6  


 


RBC  3.66 L  


 


Hgb  11.1 L  


 


Hct  33.2 L  


 


MCV  90.8  


 


MCH  30.5  


 


MCHC  33.5  


 


RDW  14.8  


 


Plt Count  301  


 


MPV  6.1 L  


 


Neut # (Auto)  7.1 H  


 


Lymph # (Auto)  1.5  


 


Mono # (Auto)  0.7  


 


Eos # (Auto)  0.2  


 


Baso # (Auto)  0.1  


 


Absolute Nucleated RBC  0.01  


 


Nucleated RBC %  0.1  


 


Sodium   133 L 


 


Potassium   3.6 


 


Chloride   100 L 


 


Carbon Dioxide   26 


 


Anion Gap   7.0 


 


BUN   14 


 


Creatinine   0.8 


 


Estimated GFR (MDRD)   70 L 


 


Glucose   147 H 


 


Calcium   8.4 L 


 


Total Bilirubin   0.4 


 


AST   18 


 


ALT   < 10 L 


 


Alkaline Phosphatase   96 


 


Troponin I    < 0.04


 


Total Protein   6.3 L 


 


Albumin   3.4 


 


Globulin   2.9 


 


Albumin/Globulin Ratio   1.2 


 


Lipase   29 


 


Urine Color   


 


Urine Clarity   


 


Urine pH   


 


Ur Specific Gravity   


 


Urine Protein   


 


Urine Glucose (UA)   


 


Urine Ketones   


 


Urine Occult Blood   


 


Urine Nitrite   


 


Urine Bilirubin   


 


Urine Urobilinogen   


 


Ur Leukocyte Esterase   


 


Ur Microscopic Review   


 


Urine Culture Comments   














  01/26/19





  00:45


 


WBC 


 


RBC 


 


Hgb 


 


Hct 


 


MCV 


 


MCH 


 


MCHC 


 


RDW 


 


Plt Count 


 


MPV 


 


Neut # (Auto) 


 


Lymph # (Auto) 


 


Mono # (Auto) 


 


Eos # (Auto) 


 


Baso # (Auto) 


 


Absolute Nucleated RBC 


 


Nucleated RBC % 


 


Sodium 


 


Potassium 


 


Chloride 


 


Carbon Dioxide 


 


Anion Gap 


 


BUN 


 


Creatinine 


 


Estimated GFR (MDRD) 


 


Glucose 


 


Calcium 


 


Total Bilirubin 


 


AST 


 


ALT 


 


Alkaline Phosphatase 


 


Troponin I 


 


Total Protein 


 


Albumin 


 


Globulin 


 


Albumin/Globulin Ratio 


 


Lipase 


 


Urine Color  YELLOW


 


Urine Clarity  CLEAR


 


Urine pH  5.0


 


Ur Specific Gravity  1.025


 


Urine Protein  NEGATIVE


 


Urine Glucose (UA)  NEGATIVE


 


Urine Ketones  NEGATIVE


 


Urine Occult Blood  NEGATIVE


 


Urine Nitrite  NEGATIVE


 


Urine Bilirubin  NEGATIVE


 


Urine Urobilinogen  0.2 (NORMAL)


 


Ur Leukocyte Esterase  NEGATIVE


 


Ur Microscopic Review  NOT INDICATED


 


Urine Culture Comments  NOT INDICATED














- Rads (name of study)


  ** head CT


Radiology: Prelim report reviewed, EMP read contemporaneously, See rad report 

(No acute intracranial abnormality)





PD MEDICAL DECISION MAKING





- ED course


Complexity details: reviewed old records, reviewed results, re-evaluated 

patient, considered differential, d/w patient, d/w family


ED course: 





73-year-old female with Parkinson's who had seizure-like activity tonight.  Upon

 arrival of the , confirmed that it was a 5-15-second episode, she does 

not recall the event.  She was confused for approximately 5-7 minutes 

afterwards.  Is now at her baseline.  No acute laboratory findings or findings 

on head CT.  He states that they do have another brain MRI scheduled in a week. 

 He will follow-up with her neurologist as well.  No acute neurological findings

 on examination in the emergency department.  Patient and family counseled 

regarding signs and symptoms for which I believe and urgent re-evaluation would 

be necessary. Patient with good understanding of and agreement to plan and is 

comfortable going home at this time





This document was made in part using voice recognition software. While efforts 

are made to proofread this document, sound alike and grammatical errors may 

occur.





Departure





- Departure


Disposition: 01 Home, Self Care


Clinical Impression: 


 Seizure-like activity





Condition: Good


Instructions:  ED Seizure New Onset Unk Cause


Follow-Up: 


Salome Hoffmann PA-C [Provider Admit Priv/Credential] - Within 1 week


Comments: 


The cause of your symptoms is unclear tonight.  Follow-up with your doctor and 

your neurologist for further evaluation.  They may want to perform an MRI of 

your brain. You should not drive and do not take baths until cleared by your 

doctor and/or neurology.  You can take showers.  You should also avoid swimming


Discharge Date/Time: 01/26/19 01:40

## 2019-01-26 VITALS — DIASTOLIC BLOOD PRESSURE: 56 MMHG | SYSTOLIC BLOOD PRESSURE: 115 MMHG

## 2019-01-26 LAB
ALBUMIN DIAFP-MCNC: 3.4 G/DL (ref 3.2–5.5)
ALBUMIN/GLOB SERPL: 1.2 {RATIO} (ref 1–2.2)
ALP SERPL-CCNC: 96 IU/L (ref 42–121)
ALT SERPL W P-5'-P-CCNC: < 10 IU/L (ref 10–60)
ANION GAP SERPL CALCULATED.4IONS-SCNC: 7 MMOL/L (ref 6–13)
AST SERPL W P-5'-P-CCNC: 18 IU/L (ref 10–42)
BASOPHILS NFR BLD AUTO: 0.1 10^3/UL (ref 0–0.1)
BASOPHILS NFR BLD AUTO: 0.8 %
BILIRUB BLD-MCNC: 0.4 MG/DL (ref 0.2–1)
BUN SERPL-MCNC: 14 MG/DL (ref 6–20)
CALCIUM UR-MCNC: 8.4 MG/DL (ref 8.5–10.3)
CHLORIDE SERPL-SCNC: 100 MMOL/L (ref 101–111)
CLARITY UR REFRACT.AUTO: CLEAR
CO2 SERPL-SCNC: 26 MMOL/L (ref 21–32)
CREAT SERPLBLD-SCNC: 0.8 MG/DL (ref 0.4–1)
EOSINOPHIL # BLD AUTO: 0.2 10^3/UL (ref 0–0.7)
EOSINOPHIL NFR BLD AUTO: 1.6 %
ERYTHROCYTE [DISTWIDTH] IN BLOOD BY AUTOMATED COUNT: 14.8 % (ref 12–15)
GFRSERPLBLD MDRD-ARVRAT: 70 ML/MIN/{1.73_M2} (ref 89–?)
GLOBULIN SER-MCNC: 2.9 G/DL (ref 2.1–4.2)
GLUCOSE SERPL-MCNC: 147 MG/DL (ref 70–100)
GLUCOSE UR QL STRIP.AUTO: NEGATIVE MG/DL
HGB UR QL STRIP: 11.1 G/DL (ref 12–16)
KETONES UR QL STRIP.AUTO: NEGATIVE MG/DL
LIPASE SERPL-CCNC: 29 U/L (ref 22–51)
LYMPHOCYTES # SPEC AUTO: 1.5 10^3/UL (ref 1.5–3.5)
LYMPHOCYTES NFR BLD AUTO: 15.9 %
MCH RBC QN AUTO: 30.5 PG (ref 27–31)
MCHC RBC AUTO-ENTMCNC: 33.5 G/DL (ref 32–36)
MCV RBC AUTO: 90.8 FL (ref 81–99)
MONOCYTES # BLD AUTO: 0.7 10^3/UL (ref 0–1)
MONOCYTES NFR BLD AUTO: 7.5 %
NEUTROPHILS # BLD AUTO: 7.1 10^3/UL (ref 1.5–6.6)
NEUTROPHILS # SNV AUTO: 9.6 X10^3/UL (ref 4.8–10.8)
NEUTROPHILS NFR BLD AUTO: 74.2 %
NITRITE UR QL STRIP.AUTO: NEGATIVE
PDW BLD AUTO: 6.1 FL (ref 7.9–10.8)
PH UR STRIP.AUTO: 5 PH (ref 5–7.5)
PLATELET # BLD: 301 10^3/UL (ref 130–450)
PROT SPEC-MCNC: 6.3 G/DL (ref 6.7–8.2)
PROT UR STRIP.AUTO-MCNC: NEGATIVE MG/DL
RBC # UR STRIP.AUTO: NEGATIVE /UL
RBC MAR: 3.66 10^6/UL (ref 4.2–5.4)
SODIUM SERPLBLD-SCNC: 133 MMOL/L (ref 135–145)
SP GR UR STRIP.AUTO: 1.02 (ref 1–1.03)
UROBILINOGEN UR QL STRIP.AUTO: (no result) E.U./DL
UROBILINOGEN UR STRIP.AUTO-MCNC: NEGATIVE MG/DL

## 2019-01-26 NOTE — CT REPORT
Reason:  possible new seizure

Procedure Date:  01/26/2019   

Accession Number:  [id number] / [id number]                    

Procedure:  CT  - Head W/O CPT Code:  

 

FULL RESULT:

 

 

EXAM:

CT HEAD

 

EXAM DATE: 1/26/2019 12:18 AM.

 

CLINICAL HISTORY: Possible new seizure.

 

COMPARISON: HEAD W/O 11/06/2018 2:55 PM.

 

TECHNIQUE: Multiaxial CT images were obtained from the foramen magnum to 

the vertex. Reformats: Sagittal and coronal. IV contrast: None.

 

In accordance with CT protocol optimization, one or more of the following 

dose reduction techniques were utilized for this exam: automated exposure 

control, adjustment of mA and/or KV based on patient size, or use of 

iterative reconstructive technique.

 

FINDINGS:

Parenchyma: No intraparenchymal hemorrhage. No evidence of mass, midline 

shift, or CT findings of infarction. Gray-white differentiation is 

distinct. Mild chronic microvascular changes noted in the cerebral white 

matter bilaterally.

 

Extraaxial Spaces: Normal for age. No subdural or epidural collections 

identified.

 

Ventricles: Normal in size and position.

 

Sinuses and Orbits: Imaged paranasal sinuses, orbits, and mastoids show 

no significant abnormality.

 

Bones: No evidence of fracture or calvarial defect.

 

Other: None.

IMPRESSION:

 

1. No acute intracranial abnormality. No significant change compared to 

11/06/2018.

2. No intracranial mass lesion, mass-effect, or hydrocephalus.

 

[facility]

## 2019-01-29 ENCOUNTER — HOSPITAL ENCOUNTER (OUTPATIENT)
Dept: HOSPITAL 76 - DI | Age: 74
Discharge: HOME | End: 2019-01-29
Attending: PHYSICIAN ASSISTANT
Payer: MEDICARE

## 2019-01-29 DIAGNOSIS — M51.37: ICD-10-CM

## 2019-01-29 DIAGNOSIS — G93.9: ICD-10-CM

## 2019-01-29 DIAGNOSIS — R56.9: ICD-10-CM

## 2019-01-29 DIAGNOSIS — M43.16: ICD-10-CM

## 2019-01-29 DIAGNOSIS — M47.9: ICD-10-CM

## 2019-01-29 DIAGNOSIS — M51.36: Primary | ICD-10-CM

## 2019-01-29 DIAGNOSIS — M48.07: ICD-10-CM

## 2019-01-29 DIAGNOSIS — M48.061: ICD-10-CM

## 2019-01-29 PROCEDURE — 70553 MRI BRAIN STEM W/O & W/DYE: CPT

## 2019-01-29 PROCEDURE — 72158 MRI LUMBAR SPINE W/O & W/DYE: CPT

## 2019-01-29 NOTE — MRI REPORT
Reason:  BACK PAIN,LUMBAR,WITH RADICULOPATHY

Procedure Date:  01/29/2019   

Accession Number:  [id number] / [id number]                    

Procedure:  MRI - Lumbar Spine W/WO CPT Code:  

 

FULL RESULT:

 

 

EXAM:

MRI LUMBAR SPINE WITHOUT AND WITH CONTRAST

 

EXAM DATE: 1/29/2019 01:43 PM.

 

CLINICAL HISTORY: 73-year-old female. BACK PAIN,LUMBAR,WITH RADICULOPATHY.

 

COMPARISONS: MR lumbar spine 10/15/2013

 

TECHNIQUE: Multiplanar, multisequence T1-weighted and fluid-sensitive 

sequences of the lumbar spine from T12 to S1 before and after 

administration of intravenous contrast. Other: None. IV contrast: 8 ML 

Gadavist.

 

FINDINGS:

Neurologic Structures: The conus terminates at L2. The conus medullaris 

and cauda equina are unremarkable.

 

Alignment: Rotatory dextroscoliosis of the lumbar spine with [place] angle 

of 16 degrees. Grade 1 retrolisthesis of L2 on L3 measuring 5 mm. Grade 1 

retrolisthesis of L3 on L4 measuring 4 mm. Grade 1 anterolisthesis L4 on 

L5 measuring 5 mm.

 

Bone Marrow: Five non-rib-bearing lumbar vertebral bodies are assumed. No 

gross fractures or bone lesions. Modic type I degenerative endplate 

changes with endplate edema and enhancement L1-L2, L2-L3, L3-L4, L4-L5, 

and L5-S1 levels. There is edema and enhancement surrounding the right 

L4-L5 facet joint (for example series 801 image 12), favored to be on the 

basis of degenerative change.

 

Disk Levels/Facets:

T12-L1: No significant central canal or foraminal narrowing. No interval 

progression.

 

L1-L2: Moderate disk height loss and desiccation. Moderate diffuse disk 

bulge. Moderate left and mild right facet arthropathy. Mild central canal 

narrowing. Moderate to severe left and mild right foraminal narrowing. 

The left foraminal narrowing has slightly progressed.

 

L2-L3: Moderate disk height loss and desiccation. Moderate diffuse disk 

bulge with endplate spurring. Moderate left and mild right facet 

arthropathy. Moderate central canal narrowing, slightly progressed. 

Moderate to severe left and moderate right foraminal narrowing, slightly 

progressed.

 

L3-L4: Moderate disk height loss and desiccation. Moderate diffuse disk 

bulge with endplate spurring. Redemonstration status post prior right 

hemilaminotomy. Moderate to severe left and moderate right facet 

arthropathy. Mild residual central canal narrowing, slightly progressed 

since the prior study. Severe left and moderate to severe right foraminal 

narrowing. The foraminal narrowing is similar to slightly progressed. 

Moderate left lateral recess narrowing with mass-effect on traversing 

left L4 nerve, similar to slightly progressed.

 

L4-L5: Mild disk height loss and desiccation. Large diffuse disk bulge 

progressed since the prior study, with superimposed disk extrusion 

extending craniad 6 mm. Severe bilateral facet arthropathy and ligamentum 

flavum hypertrophy on the left. Status post prior right hemilaminotomy. 

Moderate to severe central canal narrowing, significantly progressed. 

Severe right and moderate to severe left foraminal narrowing. The 

foraminal narrowing is also significantly progressed.

 

L5-S1: Moderate to severe disk height loss and desiccation. Moderate 

diffuse disk bulge with superimposed right far lateral disk osteophyte 

complex. Moderate to severe bilateral facet arthropathy. Mild to moderate 

central canal narrowing, slightly progressed. Severe right and moderate 

left foraminal narrowing. The foraminal narrowing is slightly progressed.

 

Spinal Canal: No enhancing masses within the spinal canal. No epidural 

abscess.

 

Musculature: Normal. No edema, abnormal enhancement, or fatty atrophy.

 

Other: There is a 2.2 cm nonspecific T2 hyperintense lesion within the 

posterior right hepatic lobe. This may represent a hepatic cyst or 

hemangioma. The visualized retroperitoneum is unremarkable.

IMPRESSION:

1. Severe multilevel degenerative spondylosis, as detailed above and 

summarized below. Compared to the prior MRI study performed on 

10/15/2013, there has been interval progression of central 

canal/foraminal narrowing at several levels, as detailed. The most 

significant progression has taken place at the L4-L5 level. No evidence 

of acute fracture or malalignment. No cord signal abnormality.

 

2. Rotatory dextroscoliosis of the lumbar spine with [place] angle of 16 

degrees. Grade 1 retrolisthesis of L2 on L3 measuring 5 mm. Grade 1 

retrolisthesis of L3 on L4 measuring 4 mm. Grade 1 anterolisthesis L4 on 

L5 measuring 5 mm.

 

3. Modic type I degenerative endplate changes with endplate edema and 

enhancement L1-L2, L2-L3, L3-L4, L4-L5, and L5-S1 levels. Modic type I 

changes may represent the source of pain.

 

4. There is edema and enhancement surrounding the right L4-L5 facet joint 

(for example series 801 image 12), favored to be on the basis of 

degenerative change. This may also represent a source of pain.

 

5. L1-L2: Mild central canal narrowing. Moderate to severe left and mild 

right foraminal narrowing. The left foraminal narrowing has slightly 

progressed. Recommend correlation for left L1 radicular symptoms

 

6. L2-L3: Moderate central canal narrowing, slightly progressed. Moderate 

to severe left and moderate right foraminal narrowing, slightly 

progressed. Recommend correlation for left greater than right L2 

radicular symptoms.

 

7. L3-L4: Mild residual central canal narrowing, slightly progressed 

since the prior study. Severe left and moderate to severe right foraminal 

narrowing. The foraminal narrowing is similar to slightly progressed. 

Moderate left lateral recess narrowing with mass-effect on traversing 

left L4 nerve, similar to slightly progressed. Recommend correlation for 

left greater than right L3 radicular symptoms and left L4 radicular 

symptoms.

 

8. L4-L5: Moderate to severe central canal narrowing, significantly 

progressed. Severe right and moderate to severe left foraminal narrowing. 

The foraminal narrowing is also significantly progressed. Recommend 

correlation for right greater than left L4 radicular symptoms.

 

9. L5-S1: Mild to moderate central canal narrowing, slightly progressed. 

Severe right and moderate left foraminal narrowing. The foraminal 

narrowing is slightly progressed. Recommend correlation for right greater 

than left L5 radicular symptoms.

 

 

 

 

Comment: The following findings are so common in adults without low back 

pain that while we report their presence, they must be interpreted with 

caution and in the context of the clinical situation. (Reference Judiek 

et al, Spine 2001)

 

Prevalence of findings in patients without low back pain:

Disk degeneration (any evidence): 92%

Disk desiccation/T2 signal loss: 83%

Disk height loss: 56%

Disk bulge: 64%

Disk protrusion: 32%

Annular tear/high intensity zone: 38%

 

[facility]

## 2019-01-29 NOTE — MRI REPORT
Reason:  SEIZURE

Procedure Date:  01/29/2019   

Accession Number:  [id number] / [id number]                    

Procedure:  MRI - Brain W/WO CPT Code:  

 

FULL RESULT:

 

 

EXAM:

MRI BRAIN WITHOUT AND WITH CONTRAST

 

EXAM DATE: 1/29/2019 02:11 PM.

 

CLINICAL HISTORY: 73-year-old female. SEIZURE.

 

COMPARISON: MR brain 07/02/2014, CT head 01/26/2019.

 

TECHNIQUE: Multiplanar, multisequence T1-weighted and fluid-sensitive MR 

sequences of the brain were performed. Sequences optimized for routine 

evaluation. Other: None. IV Contrast: 8 ML Gadavist.

 

FINDINGS:

Brain Volume: Normal for age.

 

Parenchyma: No acute hemorrhage, mass, or infarct. Scattered T2/FLAIR 

hyperintense periventricular and deep white matter lesions within 

cerebral hemispheres bilaterally, slightly increased since the prior 

study from 07/02/2014. No abnormal enhancement. No parenchymal foci 

susceptibility artifact. The hippocampi appear qualitatively symmetric 

and normal in size and signal.

 

Ventricles/Cisterns: No hydrocephalus. No abnormal extra-axial fluid 

collection or hemorrhage.

 

Orbits: Symmetric and unremarkable.

 

Sella Turcica: The pituitary gland, cavernous sinuses, suprasellar 

cistern and optic chiasm are unremarkable.

 

IAC: Symmetric and unremarkable.

 

Vasculature: Normal signal flow void is seen in the major arterial 

structures at the skull base. The dural sinuses are patent and enhance 

normally.

 

Sinuses: No acute sinus disease.

 

Bones: No focal pathologic appearing marrow signal changes.

 

Other: None.

IMPRESSION:

1. No MRI evidence of acute intracranial abnormality. Specifically, no 

evidence of acute or subacute infarct, acute intracranial hemorrhage, 

mass, midline shift, or hydrocephalus. No abnormal intracranial 

enhancement.

 

2. Scattered T2/FLAIR hyperintense periventricular and deep white matter 

lesions within cerebral hemispheres bilaterally, slightly increased since 

the prior study from 07/02/2014. These lesions are nonspecific, and can 

be seen with the entire gamut of white matter conditions, commonly as 

sequela of chronic microangiopathy.

 

3. The hippocampi appear qualitatively symmetric and normal in size and 

signal.

 

RADIA

## 2019-02-06 ENCOUNTER — HOSPITAL ENCOUNTER (OUTPATIENT)
Dept: HOSPITAL 76 - DI | Age: 74
Discharge: HOME | End: 2019-02-06
Attending: PHYSICIAN ASSISTANT
Payer: MEDICARE

## 2019-02-06 DIAGNOSIS — M81.0: Primary | ICD-10-CM

## 2019-02-06 DIAGNOSIS — Z12.31: Primary | ICD-10-CM

## 2019-02-06 DIAGNOSIS — Z78.0: ICD-10-CM

## 2019-02-06 PROCEDURE — 77080 DXA BONE DENSITY AXIAL: CPT

## 2019-02-06 PROCEDURE — 77067 SCR MAMMO BI INCL CAD: CPT

## 2019-02-07 NOTE — MAMMOGRAPHY REPORT
Reason:  Annual Screening

Procedure Date:  02/06/2019   

Accession Number:  [id number] / [id number]                    

Procedure:  EDUARDA - Screening Mammo Dig Bilat CPT Code:  

 

FULL RESULT:

 

 

EXAM: Screening Mammo Dig Bilat

 

DATE: 2/6/2019 3:16 PM

 

CLINICAL HISTORY: Routine screening. No reported personal or family 

history of breast cancer.

 

TECHNIQUE: Bilateral CC and MLO views were obtained.

 

COMPARISON: 11/22/2016 through 8/22/2014

 

FINDINGS:

The breasts demonstrate heterogeneously dense fibroglandular parenchyma 

bilaterally. Note: Patient unable to stand for 3-D imaging exam.

 

Left breast: There is a 3 cm triangular asymmetry seen in the 

retroareolar anterior breast on the MLO view only possibly indicating 

artifact of compression. There is a stable 2.5 cm oval mass in the 12:00 

breast middle depth. Stable biopsy marker is noted in the posterior 

lateral breast. No suspicious calcifications or areas of distortion.

 

Right breast: There are no suspicious masses, calcifications or areas of 

distortion.

 

IMPRESSION: Incomplete examination

 

RECOMMENDATION: Additional views left breast.

 

BI-RADS CATEGORY 0: Incomplete examination

 

STANDARD QUALIFYING STATEMENTS:

1.  This examination was not reviewed with the aid of Computer-Aided 

Detection (CAD).

2.  A negative or benign  imaging report should not preclude biopsy if 

clinically suspicious findings are present.

3.  Dense breasts may obscure an underlying neoplasm.

4. This examination was reviewed without the aid of 3D breast imaging 

(tomosynthesis).

## 2019-02-07 NOTE — DEXA REPORT
Reason:  POSTMENOPAUSAL

Procedure Date:  02/06/2019   

Accession Number:  [id number] / [id number]                    

Procedure:  DEX - Dexa Spine and/or Hip CPT Code:  

 

FULL RESULT:

 

 

EXAM: Dexa Spine and/or Hip

 

DATE: 2/6/2019 3:28 PM

 

CLINICAL HISTORY: POSTMENOPAUSAL

 

TECHNIQUE: Dual energy x-ray absorptiometry (DXA) was performed on a Meedor System.  Regions measured are the AP Spine, femoral neck, and if 

needed forearm.

 

COMPARISON: None.

 

In accordance with the International Society for Clinical Densitometry 

(ISCD) guidelines, data from previous exams may be reanalyzed using 

current recommendations and techniques.  This is done to allow a more 

accurate basis for comparison with the current study.

 

FINDINGS: The data for the lumbar spine is as follows:

 

                 BMD (g/cm/cm)         T-SCORE          Z-SCORE

 REGION

 L1                   0.871                   -2.2                   -0.2

 L2                   1.048                   -1.3                   0.7

 L3                   1.230                   0.3                    2.2

 L4                   1.392                   1.6                    3.5

TOTAL              1.150                   -0.2                 1.7

 

NOTE: All evaluable vertebrae are used for classification

 

The data for the hip is as follows:

 

                 BMD (g/cm/cm)         T-SCORE          Z-SCORE

 REGION

 Neck             0.578                       -3.3              -1.3

TOTAL            0.655                       -2.8            -1.0

 

NOTE: The femoral neck or total proximal femur, whichever is lowest, is 

used for classification.

 

IMPRESSION: THE WHO CLASSIFICATION BASED ON THE INTERNATIONAL REFERENCE 

STANDARD IS OSTEOPOROSIS.  THE FRACTURE RISK IS HIGH.

 

RECOMMENDATION: Patients with diagnosis of osteoporosis or osteopenia 

should have regular bone mineral density assessment.  For those eligible 

for Medicare, routine testing is allowed once every 2 years.  Testing 

frequency can be increased for patients who have rapidly progressing 

disease or for those who are receiving medical therapy to restore bone 

mass.

 

COMMENT:  World Health Organization (WHO) definitions for osteoporosis 

and osteopenia:

NORMAL BMD:  T-score at -1.0 or higher, fracture risk is low

OSTEOPENIA BMD:  T-score between -1.0 and -2.5, fracture risk is 

increased.

OSTEOPOROSIS BMD:  T-score at -2.5 or lower, fracture risk is high.

 

National Osteoporosis Foundation recommends:

1. Obtain adequate dietary calcium (at least 1200 mg per day) and vitamin 

D (400-800 international units per day).

2. Participate, as appropriate, in regular weightbearing and 

muscle-strengthening exercise.

3. Avoid tobacco use and reduce alcohol and caffeine intake.

4. For more detailed information see the website at www.NOF.org.

## 2019-03-25 ENCOUNTER — HOSPITAL ENCOUNTER (OUTPATIENT)
Dept: HOSPITAL 76 - DI | Age: 74
Discharge: HOME | End: 2019-03-25
Attending: PHYSICIAN ASSISTANT
Payer: MEDICARE

## 2019-03-25 DIAGNOSIS — R92.8: Primary | ICD-10-CM

## 2019-03-25 NOTE — MAMMOGRAPHY REPORT
Reason:  ABN MAMMO - LT SPEC VIEWS

Procedure Date:  03/25/2019   

Accession Number:  [id number] / [id number]                    

Procedure:  EDUARDA - Diag Special Views Dig LT CPT Code:  

 

FULL RESULT:

 

 

EXAM: Diag Special Views Dig LT

 

DATE: 3/25/2019 2:10 PM

 

CLINICAL HISTORY: The patient is recalled from screening for left breast 

asymmetry.

 

TECHNIQUE: Left breast MLO imaging is performed.

 

COMPARISON: 2/6/2019 through 2/19/2014.

 

FINDINGS:

The left breast demonstrates heterogeneously dense parenchyma. The 

previously seen 3 cm triangular asymmetry in the retroareolar left breast 

is diagnosed as overlap of normal tissue structures. No suspicious 

masses, calcifications or architectural distortion are seen.

 

IMPRESSION: Negative examination

 

RECOMMENDATION: Recommend routine annual Screening mammography unless 

otherwise clinically indicated.

 

BIRADS CATEGORY 1: Negative

 

STANDARD QUALIFYING STATEMENTS:

1.  This examination was not reviewed with the aid of Computer-Aided 

Detection (CAD).

2.  A negative or benign  imaging report should not delay biopsy if 

clinically suspicious findings are present.  Consider surgical 

consultation if warrented.  More than 5% of cancers are not identified by 

imaging.

3.  Dense breasts may obscure an underlying neoplasm.

## 2019-04-15 ENCOUNTER — HOSPITAL ENCOUNTER (OUTPATIENT)
Dept: HOSPITAL 76 - SDS | Age: 74
Discharge: HOME | End: 2019-04-15
Attending: SURGERY
Payer: MEDICARE

## 2019-04-15 VITALS — SYSTOLIC BLOOD PRESSURE: 116 MMHG | DIASTOLIC BLOOD PRESSURE: 65 MMHG

## 2019-04-15 DIAGNOSIS — D12.0: ICD-10-CM

## 2019-04-15 DIAGNOSIS — C18.6: ICD-10-CM

## 2019-04-15 DIAGNOSIS — I10: ICD-10-CM

## 2019-04-15 DIAGNOSIS — Z87.891: ICD-10-CM

## 2019-04-15 DIAGNOSIS — G20: ICD-10-CM

## 2019-04-15 DIAGNOSIS — Z12.11: Primary | ICD-10-CM

## 2019-04-15 DIAGNOSIS — Z80.0: ICD-10-CM

## 2019-04-15 PROCEDURE — 3E0H8GC INTRODUCTION OF OTHER THERAPEUTIC SUBSTANCE INTO LOWER GI, VIA NATURAL OR ARTIFICIAL OPENING ENDOSCOPIC: ICD-10-PCS | Performed by: SURGERY

## 2019-04-15 PROCEDURE — 45385 COLONOSCOPY W/LESION REMOVAL: CPT

## 2019-04-15 PROCEDURE — 45381 COLONOSCOPY SUBMUCOUS NJX: CPT

## 2019-04-15 PROCEDURE — 0DBC8ZZ EXCISION OF ILEOCECAL VALVE, VIA NATURAL OR ARTIFICIAL OPENING ENDOSCOPIC: ICD-10-PCS | Performed by: SURGERY

## 2019-04-15 PROCEDURE — 0DBM8ZX EXCISION OF DESCENDING COLON, VIA NATURAL OR ARTIFICIAL OPENING ENDOSCOPIC, DIAGNOSTIC: ICD-10-PCS | Performed by: SURGERY

## 2019-04-15 PROCEDURE — 45380 COLONOSCOPY AND BIOPSY: CPT

## 2019-04-16 ENCOUNTER — HOSPITAL ENCOUNTER (OUTPATIENT)
Dept: HOSPITAL 76 - DI | Age: 74
Discharge: HOME | End: 2019-04-16
Attending: SURGERY
Payer: MEDICARE

## 2019-04-16 DIAGNOSIS — K63.89: Primary | ICD-10-CM

## 2019-04-16 LAB
CREAT SERPLBLD-SCNC: 0.8 MG/DL (ref 0.4–1)
GFRSERPLBLD MDRD-ARVRAT: 70 ML/MIN/{1.73_M2} (ref 89–?)

## 2019-04-16 PROCEDURE — 36415 COLL VENOUS BLD VENIPUNCTURE: CPT

## 2019-04-16 PROCEDURE — 82565 ASSAY OF CREATININE: CPT

## 2019-04-16 PROCEDURE — 74177 CT ABD & PELVIS W/CONTRAST: CPT

## 2019-04-16 NOTE — CT REPORT
Reason:  COLON MASS

Procedure Date:  04/16/2019   

Accession Number:  [id number] / [id number]                    

Procedure:  CT  - Abdomen/Pelvis W CPT Code:  

 

FULL RESULT:

 

 

EXAM:

CT ABDOMEN AND PELVIS

 

EXAM DATE: 4/16/2019 02:39 PM.

 

CLINICAL HISTORY: Distal rectal/anal mass

 

COMPARISONS: ABDOMEN/PELVIS W/ 07/26/2016 3:33 PM.

 

TECHNIQUE: Routine helical CT imaging was performed through the abdomen 

and pelvis. IV contrast: OPTI 320  100mL. Enteric contrast: No. 

Reconstructions: Coronal and sagittal.

 

In accordance with CT protocol optimization, one or more of the following 

dose reduction techniques were utilized for this exam: automated exposure 

control, adjustment of mA and/or KV based on patient size, or use of 

iterative reconstructive technique.

 

FINDINGS:

Lung Bases: Unremarkable.

 

Liver: Multiple small hepatic cysts of no clinical significance. No 

suspicious mass.

 

Gallbladder/Bile Ducts: Unremarkable.

 

Spleen: Normal.

 

Pancreas: Normal.

 

Adrenal Glands: Normal.

 

Kidneys: Normal. No masses or hydronephrosis.

 

Peritoneal Cavity/Bowel: Normal. No free fluid, free air or adenopathy. 

No masses or acute inflammatory process. The appendix is well visualized 

and normal.

 

Pelvic Organs: There is an incidental 5 cm diameter round left adnexal 

cyst. The bladder and visualized pelvic organs are within normal limits.

 

Vasculature: No aneurysms or other significant abnormality.

 

Bones: No significant abnormality.

 

Other: Patient has a history of a palpable anal or distal rectal mass. CT 

is relatively insensitive for detection of same. No perirectal adenopathy.

IMPRESSION:

1. No visible distal rectal or anal mass, although CT is relatively 

insensitive for same. No evidence of regional adenopathy.

2. Incidental finding of a 5 cm maximal diameter left adnexal cyst which 

should be viewed with some suspicion in a postmenopausal female. 

Recommend transabdominal and endovaginal pelvic ultrasound for further 

evaluation.

3. Otherwise negative examination.

 

[facility]

## 2019-04-19 ENCOUNTER — HOSPITAL ENCOUNTER (OUTPATIENT)
Dept: HOSPITAL 76 - DI | Age: 74
Discharge: HOME | End: 2019-04-19
Attending: PSYCHIATRY & NEUROLOGY
Payer: MEDICARE

## 2019-04-19 ENCOUNTER — HOSPITAL ENCOUNTER (OUTPATIENT)
Dept: HOSPITAL 76 - LAB | Age: 74
Discharge: HOME | End: 2019-04-19
Attending: NURSE ANESTHETIST, CERTIFIED REGISTERED
Payer: MEDICARE

## 2019-04-19 DIAGNOSIS — C18.7: Primary | ICD-10-CM

## 2019-04-19 DIAGNOSIS — C18.7: ICD-10-CM

## 2019-04-19 DIAGNOSIS — R41.89: Primary | ICD-10-CM

## 2019-04-19 DIAGNOSIS — R41.3: ICD-10-CM

## 2019-04-19 DIAGNOSIS — R19.09: ICD-10-CM

## 2019-04-19 LAB
ANION GAP SERPL CALCULATED.4IONS-SCNC: 8 MMOL/L (ref 6–13)
BASOPHILS NFR BLD AUTO: 0.1 10^3/UL (ref 0–0.1)
BASOPHILS NFR BLD AUTO: 1.3 %
BUN SERPL-MCNC: 14 MG/DL (ref 6–20)
CALCIUM UR-MCNC: 9 MG/DL (ref 8.5–10.3)
CHLORIDE SERPL-SCNC: 102 MMOL/L (ref 101–111)
CO2 SERPL-SCNC: 27 MMOL/L (ref 21–32)
CREAT SERPLBLD-SCNC: 0.9 MG/DL (ref 0.4–1)
EOSINOPHIL # BLD AUTO: 0.2 10^3/UL (ref 0–0.7)
EOSINOPHIL NFR BLD AUTO: 1.9 %
ERYTHROCYTE [DISTWIDTH] IN BLOOD BY AUTOMATED COUNT: 15.5 % (ref 12–15)
GFRSERPLBLD MDRD-ARVRAT: 61 ML/MIN/{1.73_M2} (ref 89–?)
GLUCOSE SERPL-MCNC: 116 MG/DL (ref 70–100)
HGB UR QL STRIP: 12.6 G/DL (ref 12–16)
LYMPHOCYTES # SPEC AUTO: 1.6 10^3/UL (ref 1.5–3.5)
LYMPHOCYTES NFR BLD AUTO: 15.2 %
MCH RBC QN AUTO: 29.8 PG (ref 27–31)
MCHC RBC AUTO-ENTMCNC: 32.9 G/DL (ref 32–36)
MCV RBC AUTO: 90.4 FL (ref 81–99)
MONOCYTES # BLD AUTO: 0.8 10^3/UL (ref 0–1)
MONOCYTES NFR BLD AUTO: 7.2 %
NEUTROPHILS # BLD AUTO: 7.8 10^3/UL (ref 1.5–6.6)
NEUTROPHILS # SNV AUTO: 10.4 X10^3/UL (ref 4.8–10.8)
NEUTROPHILS NFR BLD AUTO: 74.4 %
PDW BLD AUTO: 6 FL (ref 7.9–10.8)
PLATELET # BLD: 351 10^3/UL (ref 130–450)
RBC MAR: 4.25 10^6/UL (ref 4.2–5.4)
SODIUM SERPLBLD-SCNC: 137 MMOL/L (ref 135–145)

## 2019-04-19 PROCEDURE — 82378 CARCINOEMBRYONIC ANTIGEN: CPT

## 2019-04-19 PROCEDURE — 80048 BASIC METABOLIC PNL TOTAL CA: CPT

## 2019-04-19 PROCEDURE — 85025 COMPLETE CBC W/AUTO DIFF WBC: CPT

## 2019-04-19 PROCEDURE — 70551 MRI BRAIN STEM W/O DYE: CPT

## 2019-04-19 PROCEDURE — 36415 COLL VENOUS BLD VENIPUNCTURE: CPT

## 2019-04-19 PROCEDURE — 86304 IMMUNOASSAY TUMOR CA 125: CPT

## 2019-04-19 NOTE — MRI REPORT
Reason:  SPELL OF ALTERED COGNITION,MEMORY LOSS

Procedure Date:  04/19/2019   

Accession Number:  [id number] / [id number]                    

Procedure:  MRI - Brain W/O CPT Code:  

 

FULL RESULT:

 

 

EXAM:

MRI BRAIN WITHOUT CONTRAST

 

EXAM DATE: 4/19/2019 04:20 PM.

 

CLINICAL HISTORY: 73-year-old with progressive memory loss and spell of 

altered mental status including confusion, disorientation, and decreased 

mentation.

 

COMPARISON: BRAIN W/WO 01/29/2019 12:59 PM.

HEAD W/O 01/26/2019 12:13 AM.

 

TECHNIQUE: Multiplanar, multisequence T1-weighted and fluid-sensitive MR 

sequences of the brain were performed. Sequences optimized for routine 

evaluation. Other: None. IV Contrast: None.

 

FINDINGS:

Motion artifact technically limits evaluation.

 

Brain Volume: Normal for age.

 

Parenchyma/Dura: No acute parenchymal hemorrhage, mass, or midline shift. 

Mild bilateral areas of T2/FLAIR signal hyperintensity seen that appear 

similar MR brain 01/29/2019. There is patchy FLAIR signal hyperintensity 

seen within the danica. No areas of restricted diffusion seen to suggest 

acute infarct. There is susceptibility artifact seen within bilateral 

basal ganglia that may represent mineralization.

Ventricles/Cisterns: No hydrocephalus. No abnormal extra-axial fluid 

collection or hemorrhage.

 

Orbits: Symmetric and unremarkable.

 

Sella Turcica: The pituitary gland, cavernous sinuses, suprasellar 

cistern and optic chiasm are unremarkable.

 

IAC: Symmetric and unremarkable.

 

Vasculature: Normal signal flow void is seen in the major arterial 

structures at the skull base.

 

Sinuses: No acute appearing sinus disease.

 

Bones: No focal pathologic appearing marrow signal changes.

 

Other: None.

IMPRESSION:

1. No definite acute intracranial pathology seen; specifically, no acute 

infarct, acute intracranial hemorrhage, mass, hydrocephalus, or midline 

shift.

 

2. Mild white matter changes seen that appear similar to MR brain 

01/29/2019 and while nonspecific, likely represent sequela of chronic 

small vessel ischemic disease.

 

[facility]

## 2019-04-23 ENCOUNTER — HOSPITAL ENCOUNTER (INPATIENT)
Dept: HOSPITAL 76 - MS2 | Age: 74
LOS: 3 days | Discharge: HOME | DRG: 331 | End: 2019-04-26
Attending: SURGERY | Admitting: SURGERY
Payer: MEDICARE

## 2019-04-23 DIAGNOSIS — N83.201: ICD-10-CM

## 2019-04-23 DIAGNOSIS — M54.9: ICD-10-CM

## 2019-04-23 DIAGNOSIS — G89.29: ICD-10-CM

## 2019-04-23 DIAGNOSIS — Z87.891: ICD-10-CM

## 2019-04-23 DIAGNOSIS — Z96.642: ICD-10-CM

## 2019-04-23 DIAGNOSIS — L98.9: ICD-10-CM

## 2019-04-23 DIAGNOSIS — C18.9: Primary | ICD-10-CM

## 2019-04-23 DIAGNOSIS — K42.9: ICD-10-CM

## 2019-04-23 PROCEDURE — 0UT24ZZ RESECTION OF BILATERAL OVARIES, PERCUTANEOUS ENDOSCOPIC APPROACH: ICD-10-PCS | Performed by: OBSTETRICS & GYNECOLOGY

## 2019-04-23 PROCEDURE — 86901 BLOOD TYPING SEROLOGIC RH(D): CPT

## 2019-04-23 PROCEDURE — 36415 COLL VENOUS BLD VENIPUNCTURE: CPT

## 2019-04-23 PROCEDURE — 86850 RBC ANTIBODY SCREEN: CPT

## 2019-04-23 PROCEDURE — 80053 COMPREHEN METABOLIC PANEL: CPT

## 2019-04-23 PROCEDURE — 85025 COMPLETE CBC W/AUTO DIFF WBC: CPT

## 2019-04-23 PROCEDURE — 0DBL4ZZ EXCISION OF TRANSVERSE COLON, PERCUTANEOUS ENDOSCOPIC APPROACH: ICD-10-PCS | Performed by: SURGERY

## 2019-04-23 PROCEDURE — 0DBM4ZZ EXCISION OF DESCENDING COLON, PERCUTANEOUS ENDOSCOPIC APPROACH: ICD-10-PCS | Performed by: SURGERY

## 2019-04-23 PROCEDURE — 07BC4ZX EXCISION OF PELVIS LYMPHATIC, PERCUTANEOUS ENDOSCOPIC APPROACH, DIAGNOSTIC: ICD-10-PCS | Performed by: SURGERY

## 2019-04-23 PROCEDURE — 0WQF4ZZ REPAIR ABDOMINAL WALL, PERCUTANEOUS ENDOSCOPIC APPROACH: ICD-10-PCS | Performed by: SURGERY

## 2019-04-23 PROCEDURE — 93005 ELECTROCARDIOGRAM TRACING: CPT

## 2019-04-23 PROCEDURE — 86900 BLOOD TYPING SEROLOGIC ABO: CPT

## 2019-04-23 PROCEDURE — 0UT74ZZ RESECTION OF BILATERAL FALLOPIAN TUBES, PERCUTANEOUS ENDOSCOPIC APPROACH: ICD-10-PCS | Performed by: OBSTETRICS & GYNECOLOGY

## 2019-04-23 RX ADMIN — CARBIDOPA AND LEVODOPA SCH TAB: 25; 100 TABLET ORAL at 21:09

## 2019-04-23 RX ADMIN — CARBIDOPA AND LEVODOPA SCH TAB: 25; 100 TABLET ORAL at 17:07

## 2019-04-23 RX ADMIN — SODIUM CHLORIDE, PRESERVATIVE FREE PRN ML: 5 INJECTION INTRAVENOUS at 14:57

## 2019-04-23 RX ADMIN — RIVASTIGMINE TARTRATE SCH MG: 1.5 CAPSULE ORAL at 21:09

## 2019-04-23 RX ADMIN — Medication PRN MLS/HR: at 12:20

## 2019-04-23 RX ADMIN — CITALOPRAM HYDROBROMIDE SCH MG: 10 TABLET ORAL at 21:09

## 2019-04-23 RX ADMIN — SODIUM CHLORIDE SCH MG: 9 INJECTION, SOLUTION INTRAVENOUS at 14:56

## 2019-04-23 RX ADMIN — SODIUM CHLORIDE, PRESERVATIVE FREE SCH: 5 INJECTION INTRAVENOUS at 17:03

## 2019-04-23 RX ADMIN — SODIUM CHLORIDE, POTASSIUM CHLORIDE, SODIUM LACTATE AND CALCIUM CHLORIDE SCH MLS/HR: 600; 310; 30; 20 INJECTION, SOLUTION INTRAVENOUS at 12:20

## 2019-04-23 NOTE — OPERATIVE REPORT
Operative Report





- General


Admit Date: 04/23/19


Procedure Date: 04/23/19


Planned Procedure: 


Laparoscopic sigmoidectomy, possible descending colectomy based on location of 

tumor, left salpingo-oophorectomy, possible right salpingo-oophorectomy


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 

the anal verge


Procedure Performed: 


Excision umbilical cutaneous lesion


Laparoscopic distal transverse and descending colectomy with mobilization of the

splenic flexure


Umbilical herniorrhaphy


Bilateral salpingo-oophorectomy and pelvic washings (reported separately by Dr. Stewart)


Post Op Diagnosis: Same with umbilical hernia and cutaneous umbilical lesion





- Procedure Note


Primary Surgeon: Donald May MD


Secondary Surgeon: Bashir Stewart MD


Anesthesia Provider: Jessy Chang CRNA


Anesthesia Technique: Epidural, General ET tube, Local (30 mL of half percent 

Marcaine with epinephrine)


IV Fluids (mL): 1,800


Estimated Blood Loss (mL): 15


Urine Output (mL): 150


Drain/Tube Type: Other (None.)


Complications: 


None.





- Other


Other Information/Narrative: 


OPERATIVE DESCRIPTION/REPORT:





After verbal and written informed consent was obtained detailing the risks of 

infection, bleeding requiring transfusion with its risks, nerve injury, and 

death, and after I met with the patient confirming the surgery and the site of 

the surgery, the patient was brought to the operative suite and placed supine on

the operating table.  Great care was taken to avoid pressure points to prevent 

pressure necrosis or nerve injury.  Monitoring devices were applied along with 

TEDs and pneumatic compressive stockings (to prevent DVT). The patient received 

preoperative antibiotics for surgical prophylaxis.  Jessy Chang CRNA sedated 

and anesthetized the patient for the entire procedure.   The patient was prepped

and draped in the usual sterile manner.  With the patient draped my initials 

were clearly visible.  A "time in" then confirmed that the patient was 

identified with 3 identifiers (name, birth date and medical record number), the 

history and physical was in the chart, the signed consent confirming the 

procedure was in the chart, the patient was in the correct position, the 

aforementioned prophylactic measures were in place or given, we had the correct 

personnel and equipment to complete the procedure and that anesthesia, surgery 

and nursing were given an opportunity to express any concerns.  With the 

agreement of everyone in the room, we proceeded with the operation.





Examination of the base of the patient's umbilicus revealed a verrucous 

appearing lesion and rather than cut through this and then sew this together I 

excised it with a pass of Metzenbaum scissors.  A defect in the midline fascia 

was consistent with the presence of an umbilical hernia.  This had not been 

appreciated preoperatively.  This defect was then used to place the 12 mm blunt 

tipped balloon tipped catheter and the abdomen was insufflated with carbon 

dioxide to a steady state pressure of 15 mmHg.  The patient was then placed in 

Trendelenburg position and a visual examination of the abdomen commands.  There 

were adhesions in the left upper quadrant the precluded seeing the colon well.  

A 5 mm port was placed in the right lower quadrant under direct vision without 

incident.  This allowed manipulation of the adnexa and pelvic washings to be 

performed.  This portion of the procedure should be included with Dr. Stewart's 

dictation.  It also allowed for manipulation of the adnexa bilaterally and the 

markedly enlarged left adnexa (cystic-appearing) as well as the normal-appearing

right adnexa were clearly seen and photographed.  Again this should be part of 

Dr. Stewart's dictation.





At this point the port in the umbilicus was removed and the incision was 

lengthened to 4 cm using the scalpel as well as Bovie electrocautery.  A finger 

sweep of the anterior abdominal wall confirmed that there were no adhesions to 

the anterior abdominal wall and placement of the Gelport could be safely 

accomplished.  The flexible ring was inserted into the abdomen again taking care

not to trap any bowel between it and the abdominal wall and the other ring was 

rotated down securing the opening.  After placing two 5 mm ports and a 12 mm 

port through the Gelport, the Gelport was then latched onto the ring and the 

pneumoperitoneum was then established using carbon dioxide insufflation to a 

steady state pressure of 15 mmHg.  At this point the ports and the GelPort as 

well as the previously placed 5 mm port in the right lower quadrant were used to

perform the bilateral salpingo-oophorectomy which again should be part of Dr. Stewart's dictation.  I would like to note that Dr. Stewart's portion of this 

operation was entirely bloodless.





At this point, with the bilateral salpingo-oophorectomy and pelvic washings 

completed I proceeded with my portion of the operation.  Please note that during

Dr. Stewart's portion of the operation I was his assistant and during my portion of

the operation he was my assistant.





At various times during the operation the patient was either placed in reverse 

Trendelenburg or Trendelenburg position but she was always rotated slightly to 

the right to aid with visualization.  During the procedure the ports were used 

interchangeably to afford the best visualization and allow the easiest diss

ection.  Adhesions of the omentum to the anterior abdominal wall were taken down

using sequential application of the LigaSure.  Laparoscopic scissors were also 

used in this dissection.  Once this was completed it was clear that the tattooed

portion of the colon was the descending colon.  The left colon was grasped using

Prestige graspers and dissected from the lateral wall using a combination of B

ovie electrocautery as well as LigaSure.  This dissection progressed up and 

around the splenic flexure thus mobilizing the splenic flexure.  The gastrocolic

omentum was transected using serial application of the LigaSure were all the way

to the proximal transverse colon.  I dissected the colon proximally and distally

to ensure that there was a greater than 5 cm both distal and proximal margins.  

With this mobilization I was able to bring the mid transverse colon as well as 

the distal descending colon up to the midline incision without any undue 

tension.  





I removed the GelPort and grasped the transverse colon and marched distally 

until I got to the tumor.  I transected the mid transverse colon with a INA 75 

stapler and a blue load after opening the mesentery with application of 

LigaSure.  I then dissected down on the mesentery to ensure that I was able to 

get the entire a good lymph node excision.  Once this was done I was able to 

talk the proximal transverse colon back into the abdomen to get it out of the 

way and then grasping the distal transverse colon work the tumor up into the 

incision.  Once I was able to get the tumor up into the wound I was able to 

dissect an additional 5-7 cm beyond the tumor.  Some additional adhesions to the

lateral wall had to be taken primarily with Bovie electrocautery over my finger.

 Distally I then dissected down onto the mesentery again using serial 

application of LigaSure and taking it down to the base of the mesentery.  The 

base of the mesentery was secured using a 2-0 Vicryl suture ligature and distal 

to this the vasculature was transected using an application of the LigaSure.  In

this manner I had not only the suture but the LigaSure ensuring complete 

hemostasis.  Approximately 7 cm beyond the tumor I applied a pursestring applier

and a 3-0 Prolene on a Will needle was used to create the pursestring.  Just 

proximal to this I have applied a Shania to minimize any spillage and transected 

the colon with a knife.  The specimen was sent off the operative field to be 

placed into formalin and sent to pathology for pathologic evaluation.  The anvil

of a 28 mm EEA stapler was placed into the open end of the distal descending 

colon and the pursestring was tied tight around the post.  The distal descending

colon was then cleaned of any fatty attachments to the anastomotic site using 

Bovie electrocautery.





A small transverse colotomy was made just proximal to the staple line in the 

transverse colon to allow for placement of the EEA stapler.  The stapler was 

placed and the spike was brought out at the INA staple line.  Great care was 

taken to ensure that neither the transverse colon nor the distal descending 

colon was twisted.  The post of the anvil locked in place on the spike and the 

stapler was fired creating the colocolostomy.  There was absolutely no tension 

on the anastomosis.  The colotomy in the transverse colon was then closed using 

another application of the INA stapler.  Digital and visual examination of both 

the anastomosis and the colotomy closure revealed that they were widely patent. 

Both the anastomosis and the colotomy closure were then buttressed using 

interrupted 3-0 Vicryl Lembert sutures.  The abdomen was copiously irrigated 

with warm sterile saline.  The flexible ring was then removed from the abdomen. 

I changed my gloves for the closure.  





The fascia and skin were then injected with the 30 cc of % marcaine for pain 

control.  The fascial defect was then approximated using 0-PDS suture in a 

running manner.  In doing so the umbilical hernia was repaired.  The skin 

incisions were closed using an interrupted 4-0 Monocryl subcuticular suture.  

Dermabond and a dressing were applied.  All surgical counts were reported as 

correct.  Having tolerated the procedure well, the patient was subsequently 

extubated and taken to recovery room in good and stable condition.





Dragon disclaimer:





This document was created in part using voice recognition technology.  Because 

of the inherent limitations of the system ([employer]'s Dragon Dictate user manual 

states that the licensee understands that speech recognition is a statistical 

process and that recognition errors are inherent in the process), occasional 

same sounding word substitutions and grammatical errors do occur and persist 

despite proofreading.  Please read this document for context.

## 2019-04-23 NOTE — ANESTHESIA
Pre-Anesthesia VS, & Labs





- Diagnosis





Colon cancer, left adenexal mass





- Procedure





Left salpingoophrectomy and sigmoidectomy


Vital Signs: 





                                        











Temp Pulse Resp BP Pulse Ox


 


 36.2 C L  94   20   178/75 H  95 


 


 04/23/19 06:45  04/23/19 06:45  04/23/19 06:45  04/23/19 06:45  04/23/19 06:45














                                        





Height                           5 ft 2 in


Weight (kg)                      57.3 kg


Body Mass Index                  24.2











- NPO


>8 hours





- Pregnancy


Is Patient Pregnant?: No





- Lab Results


Current Lab Results: 





Hgb 12


Hct 38


Plat 351


Lab results reviewed: Yes





Home Medications and Allergies





                                        





Carbidopa/Levodopa 25/100 [Sinemet 25 mg/100 mg] 2 tab PO TID 03/18/15 


Citalopram [CeleXA] 20 mg PO DAILY 03/18/15 


Oxybutynin Chloride [Ditropan Xl] 10 mg PO DAILY 03/18/15 


Polyethylene Glycol 3350 [Miralax] 17 gm PO DAILY 11/16/16 


rOPINIRole [Requip] 2 mg PO TID 11/16/16 


Rivastigmine [Exelon] 1 cap PO TID 06/01/17 








Allergies/Adverse Reactions: 


                                    Allergies











Allergy/AdvReac Type Severity Reaction Status Date / Time


 


No Known Drug Allergies Allergy   Verified 01/25/19 23:32














Anes History & Medical History





- Anesthetic History


Anesthesia Complications: reports: No previous complications





- Medical History


Cardiovascular: reports: None


Pulmonary: reports: None


Gastrointestinal: reports: Ulcers


Urinary: reports: None


Neuro: reports: Parkinson's, Other (had seizures 1 month ago)


Musculoskeletal: reports: Osteoarthritis, Chronic back pain


Endocrine/Autoimmune: reports: None


Blood Disorders: reports: None


Skin: reports: None


Smoking Status: Former smoker (quit 20 years ago)


Psychosocial: reports: Depression





- Surgical History


General: Colonoscopy


Eyes Ears Nose Throat (EENT): 


Orthopedic: Hip replacement, Rotator cuff repair





Results





- EKG Results


EKG Comparison: Reviewed EKG





Exam


General: Alert, Oriented x3, Cooperative, No acute distress


Dental: WNL


Mouth Opening: 3 Fingerbreadth


Neck Mobility: Normal


Mallampati classification: II


Thyromental Distance: less than 4 cm (2 FB)


Respiratory: Lungs clear, Normal breath sounds, No respiratory distress, No 

accessory muscle use


Cardiovascular: Regular rate (tachy), Normal S1, Normal S2, No murmurs


Mental/Cognitive Status: Alert/Oriented X3, Normal for patient





Plan


Anesthesia Type: General, Epidural (Thoracic epidural for post op pain control)


Regional Block: Per Surgeon's request for Post Op pain control (thoracic 

epidural)


Consent for Procedure(s) Verified and Reviewed: Yes


Code Status: Attempt Resuscitation


ASA classification: 2-Mild systemic disease


Is this case an emergency?: No

## 2019-04-24 LAB
ALBUMIN DIAFP-MCNC: 3 G/DL (ref 3.2–5.5)
ALBUMIN/GLOB SERPL: 1.3 {RATIO} (ref 1–2.2)
ALP SERPL-CCNC: 60 IU/L (ref 42–121)
ALT SERPL W P-5'-P-CCNC: < 10 IU/L (ref 10–60)
ANION GAP SERPL CALCULATED.4IONS-SCNC: 10 MMOL/L (ref 6–13)
AST SERPL W P-5'-P-CCNC: 20 IU/L (ref 10–42)
BASOPHILS NFR BLD AUTO: 0 10^3/UL (ref 0–0.1)
BASOPHILS NFR BLD AUTO: 0.3 %
BILIRUB BLD-MCNC: 0.8 MG/DL (ref 0.2–1)
BUN SERPL-MCNC: 10 MG/DL (ref 6–20)
CALCIUM UR-MCNC: 8.3 MG/DL (ref 8.5–10.3)
CHLORIDE SERPL-SCNC: 104 MMOL/L (ref 101–111)
CO2 SERPL-SCNC: 25 MMOL/L (ref 21–32)
CREAT SERPLBLD-SCNC: 0.8 MG/DL (ref 0.4–1)
EOSINOPHIL # BLD AUTO: 0 10^3/UL (ref 0–0.7)
EOSINOPHIL NFR BLD AUTO: 0.2 %
ERYTHROCYTE [DISTWIDTH] IN BLOOD BY AUTOMATED COUNT: 15.4 % (ref 12–15)
GFRSERPLBLD MDRD-ARVRAT: 70 ML/MIN/{1.73_M2} (ref 89–?)
GLOBULIN SER-MCNC: 2.4 G/DL (ref 2.1–4.2)
GLUCOSE SERPL-MCNC: 103 MG/DL (ref 70–100)
HGB UR QL STRIP: 9.8 G/DL (ref 12–16)
LYMPHOCYTES # SPEC AUTO: 1.4 10^3/UL (ref 1.5–3.5)
LYMPHOCYTES NFR BLD AUTO: 10.5 %
MCH RBC QN AUTO: 29.7 PG (ref 27–31)
MCHC RBC AUTO-ENTMCNC: 32.5 G/DL (ref 32–36)
MCV RBC AUTO: 91.4 FL (ref 81–99)
MONOCYTES # BLD AUTO: 0.9 10^3/UL (ref 0–1)
MONOCYTES NFR BLD AUTO: 7.2 %
NEUTROPHILS # BLD AUTO: 10.6 10^3/UL (ref 1.5–6.6)
NEUTROPHILS # SNV AUTO: 13 X10^3/UL (ref 4.8–10.8)
NEUTROPHILS NFR BLD AUTO: 81.8 %
PDW BLD AUTO: 6.5 FL (ref 7.9–10.8)
PLATELET # BLD: 261 10^3/UL (ref 130–450)
PROT SPEC-MCNC: 5.4 G/DL (ref 6.7–8.2)
RBC MAR: 3.31 10^6/UL (ref 4.2–5.4)
SODIUM SERPLBLD-SCNC: 139 MMOL/L (ref 135–145)

## 2019-04-24 RX ADMIN — SODIUM CHLORIDE, POTASSIUM CHLORIDE, SODIUM LACTATE AND CALCIUM CHLORIDE SCH MLS/HR: 600; 310; 30; 20 INJECTION, SOLUTION INTRAVENOUS at 08:12

## 2019-04-24 RX ADMIN — HYDROMORPHONE HYDROCHLORIDE PRN MG: 1 INJECTION, SOLUTION INTRAMUSCULAR; INTRAVENOUS; SUBCUTANEOUS at 06:42

## 2019-04-24 RX ADMIN — RIVASTIGMINE TARTRATE SCH MG: 1.5 CAPSULE ORAL at 21:11

## 2019-04-24 RX ADMIN — SODIUM CHLORIDE, PRESERVATIVE FREE SCH: 5 INJECTION INTRAVENOUS at 16:39

## 2019-04-24 RX ADMIN — CARBIDOPA AND LEVODOPA SCH TAB: 25; 100 TABLET ORAL at 17:02

## 2019-04-24 RX ADMIN — CARBIDOPA AND LEVODOPA SCH TAB: 25; 100 TABLET ORAL at 08:11

## 2019-04-24 RX ADMIN — CITALOPRAM HYDROBROMIDE SCH MG: 10 TABLET ORAL at 21:11

## 2019-04-24 RX ADMIN — RIVASTIGMINE TARTRATE SCH MG: 1.5 CAPSULE ORAL at 08:11

## 2019-04-24 RX ADMIN — OXYCODONE PRN MG: 5 TABLET ORAL at 06:50

## 2019-04-24 RX ADMIN — SODIUM CHLORIDE SCH MG: 9 INJECTION, SOLUTION INTRAVENOUS at 06:43

## 2019-04-24 RX ADMIN — SODIUM CHLORIDE, PRESERVATIVE FREE SCH: 5 INJECTION INTRAVENOUS at 09:40

## 2019-04-24 RX ADMIN — SODIUM CHLORIDE, PRESERVATIVE FREE SCH ML: 5 INJECTION INTRAVENOUS at 06:46

## 2019-04-24 RX ADMIN — CARBIDOPA AND LEVODOPA SCH TAB: 25; 100 TABLET ORAL at 21:12

## 2019-04-24 RX ADMIN — CARBIDOPA AND LEVODOPA SCH TAB: 25; 100 TABLET ORAL at 13:15

## 2019-04-24 RX ADMIN — METOPROLOL SUCCINATE SCH MG: 25 TABLET, EXTENDED RELEASE ORAL at 08:11

## 2019-04-24 RX ADMIN — POLYETHYLENE GLYCOL 3350 SCH: 17 POWDER, FOR SOLUTION ORAL at 09:40

## 2019-04-24 NOTE — OPERATIVE REPORT
DATE OF SERVICE: 04/23/2019

Physician: Bashir Stewart MD

 

PREOPERATIVE DIAGNOSIS:  Complex left ovarian cyst.

 

POSTOPERATIVE DIAGNOSIS:  Complex left ovarian cyst.

 

PROCEDURE PERFORMED:  Bilateral laparoscopic oophorectomy and salpingectomy.

 

SURGEON:  Bashir Stewart MD.

 

FIRST ASSISTANT:  Donald May MD.

 

TYPE OF ANESTHESIA:  General via endotracheal tube.

 

FINDINGS:  The patient had a large, roughly 5 cm right ovarian cyst.  The pelvic
cavity did not show evidence of any studding.  Pelvic washings were obtained.

 

DESCRIPTION OF PROCEDURE:  Following adequate endotracheal anesthesia, the 
patient was prepped and draped in the usual fashion.  A timeout was performed.  
At this point, a speculum was placed in the vagina.  The cervix was visualized 
and then grasped with a single-tooth tenaculum.  The cervix was dilated to 8 mm.
 Following this, a Oli uterine manipulator was placed.  The 's gloves
were changed.  The laparoscopic ports were placed by Dr. Donald May.  

 

At this point, following good visualization of the pelvis, patient was placed in
Trendelenburg and pelvic washings were obtained.  At this point, the 
infundibulopelvic ligament on the left-hand side was visualized, triply 
cauterized, and then transected utilizing the LigaSure.  The attachments of the 
ovary to the uterus as well as the fallopian tube were then doubly cauterized 
and transected.  The mesosalpinx up to, but not including the round ligament, 
was then cauterized and transected.  The ovary was then brought up through the 
supraumbilical port.  At this point, the right infundibulopelvic ligament was 
doubly cauterized, transected, and then the uteroovarian ligament was also 
doubly cauterized and transected.  The remainder of the ovarian attachments was 
cauterized and transected utilizing LigaSure.  This was then also brought out 
through the supraumbilical port.  These areas were inspected for bleeding; none 
was noted. 

 

At this point, the remainder of the procedure was performed by Dr. Donald May.  At the end of the procedure, the balloon was let down from the 
uterine manipulator and then removed.  The patient tolerated the procedure well.

 

 

DD: 04/23/2019 17:03

TD: 04/23/2019 17:11

Job #: [id number]

[facility] CenterSIDDHARTHA

## 2019-04-24 NOTE — ANESTHESIA POST OP EVALUATION
Anesthesia Post Eval





- Post Anesthesia Eval


CV Function Including HR & BP: positive: Stable


Pain Control: positive: Adequate


Nausea & Vomiting: positive: Negative


Mental Status: positive: Appropriate


Anesthesia Complications: positive: None (Epidural controlling pain well with no

motor block, up in chair, should increase ambulation today.  Epidural site 

intact, no redness or drainage. Plan to hold lovenox tomorrow am and D/C 

cathether tomorrow.)

## 2019-04-25 RX ADMIN — POLYETHYLENE GLYCOL 3350 SCH: 17 POWDER, FOR SOLUTION ORAL at 08:49

## 2019-04-25 RX ADMIN — CARBIDOPA AND LEVODOPA SCH TAB: 25; 100 TABLET ORAL at 17:15

## 2019-04-25 RX ADMIN — SODIUM CHLORIDE, PRESERVATIVE FREE PRN ML: 5 INJECTION INTRAVENOUS at 18:09

## 2019-04-25 RX ADMIN — CARBIDOPA AND LEVODOPA SCH TAB: 25; 100 TABLET ORAL at 13:22

## 2019-04-25 RX ADMIN — OXYCODONE PRN MG: 5 TABLET ORAL at 14:01

## 2019-04-25 RX ADMIN — SODIUM CHLORIDE, PRESERVATIVE FREE SCH: 5 INJECTION INTRAVENOUS at 08:49

## 2019-04-25 RX ADMIN — SODIUM CHLORIDE, PRESERVATIVE FREE SCH ML: 5 INJECTION INTRAVENOUS at 17:37

## 2019-04-25 RX ADMIN — OXYCODONE PRN MG: 5 TABLET ORAL at 17:36

## 2019-04-25 RX ADMIN — HYDROMORPHONE HYDROCHLORIDE PRN MG: 1 INJECTION, SOLUTION INTRAMUSCULAR; INTRAVENOUS; SUBCUTANEOUS at 18:09

## 2019-04-25 RX ADMIN — METOPROLOL SUCCINATE SCH MG: 25 TABLET, EXTENDED RELEASE ORAL at 09:32

## 2019-04-25 RX ADMIN — CARBIDOPA AND LEVODOPA SCH TAB: 25; 100 TABLET ORAL at 21:19

## 2019-04-25 RX ADMIN — CARBIDOPA AND LEVODOPA SCH TAB: 25; 100 TABLET ORAL at 09:32

## 2019-04-25 RX ADMIN — SODIUM CHLORIDE, PRESERVATIVE FREE SCH ML: 5 INJECTION INTRAVENOUS at 06:57

## 2019-04-25 RX ADMIN — OXYCODONE PRN MG: 5 TABLET ORAL at 21:20

## 2019-04-25 RX ADMIN — Medication PRN MLS/HR: at 01:30

## 2019-04-25 RX ADMIN — SODIUM CHLORIDE SCH MG: 9 INJECTION, SOLUTION INTRAVENOUS at 06:57

## 2019-04-25 RX ADMIN — ACETAMINOPHEN PRN MLS/HR: 10 INJECTION, SOLUTION INTRAVENOUS at 17:37

## 2019-04-25 RX ADMIN — RIVASTIGMINE TARTRATE SCH MG: 1.5 CAPSULE ORAL at 09:32

## 2019-04-25 RX ADMIN — RIVASTIGMINE TARTRATE SCH MG: 1.5 CAPSULE ORAL at 17:08

## 2019-04-25 RX ADMIN — SODIUM CHLORIDE, POTASSIUM CHLORIDE, SODIUM LACTATE AND CALCIUM CHLORIDE SCH MLS/HR: 600; 310; 30; 20 INJECTION, SOLUTION INTRAVENOUS at 06:57

## 2019-04-25 RX ADMIN — SODIUM CHLORIDE, PRESERVATIVE FREE PRN ML: 5 INJECTION INTRAVENOUS at 06:57

## 2019-04-25 NOTE — ANESTHESIA POST OP EVALUATION
Anesthesia Post Eval





- Post Anesthesia Eval


CV Function Including HR & BP: positive: Stable





- Other Details/Therapies


Other Details/Therapies: 





Patient reports she has had good pain control with epidural. Epidural removed 

with tip intact. Lovenox to be resumed in 4 hours. Site clear

## 2019-04-25 NOTE — PROVIDER PROGRESS NOTE
Subjective





- Prog Note Date


Prog Note Date: 04/25/19


Prog Note Time: 09:28





- Subjective


Subjective: 





Pt eating regular food as of this morning.  No flatus/BM.  Ambulating.  Pain 

well-controlled.





Objective





- Vital Signs/Intake & Output


Vital Signs: 


                                Vital Signs x48h











  Temp Pulse Resp BP Pulse Ox


 


 04/25/19 08:01  36.4 C L  80  16  171/69 H  94











Intake & Output: 


                                 Intake & Output











 04/22/19 04/23/19 04/24/19 04/25/19





 23:59 23:59 23:59 23:59


 


Intake Total  2400 2403.333 1600


 


Output Total  1015 1339 1300


 


Balance  1385 1064.333 300














- Objective


General Appearance: positive: No acute distress


Eyes Bilateral: positive: Normal inspection


ENT: positive: ENT inspection nml


Neck: positive: Nml inspection


Respiratory: positive: Chest non-tender


Abdomen: positive: Tenderness


Back: positive: Nml inspection


Skin: positive: Color nml


Extremities: positive: Non-tender


Neurologic/Psychiatric: positive: Oriented x3





- Lab Results


Fish Bones: 


                                 04/24/19 04:25





                                 04/24/19 04:25


Other Labs: 


                               Lab Results x24hrs











  04/25/19 04/24/19 04/24/19 Range/Units





  07:35 21:10 16:52 


 


POC Whole Bld Glucose  97  95  96  (70 - 100)  mg/dL














  04/24/19 Range/Units





  11:22 


 


POC Whole Bld Glucose  124 H  (70 - 100)  mg/dL














Assessment/Plan





- Problem List


(1) Colon cancer


Impression: 


POD 2 from lap partial colectomy.  Recovering normally, waiting for return of 

bowel function.

## 2019-04-26 VITALS — DIASTOLIC BLOOD PRESSURE: 56 MMHG | SYSTOLIC BLOOD PRESSURE: 142 MMHG

## 2019-04-26 RX ADMIN — HYDROMORPHONE HYDROCHLORIDE PRN MG: 1 INJECTION, SOLUTION INTRAMUSCULAR; INTRAVENOUS; SUBCUTANEOUS at 06:20

## 2019-04-26 RX ADMIN — CARBIDOPA AND LEVODOPA SCH TAB: 25; 100 TABLET ORAL at 07:56

## 2019-04-26 RX ADMIN — SODIUM CHLORIDE, PRESERVATIVE FREE SCH ML: 5 INJECTION INTRAVENOUS at 08:02

## 2019-04-26 RX ADMIN — OXYCODONE PRN MG: 5 TABLET ORAL at 04:23

## 2019-04-26 RX ADMIN — SODIUM CHLORIDE SCH MG: 9 INJECTION, SOLUTION INTRAVENOUS at 06:20

## 2019-04-26 RX ADMIN — POLYETHYLENE GLYCOL 3350 SCH GM: 17 POWDER, FOR SOLUTION ORAL at 08:02

## 2019-04-26 RX ADMIN — METOPROLOL SUCCINATE SCH MG: 25 TABLET, EXTENDED RELEASE ORAL at 07:56

## 2019-04-26 RX ADMIN — SODIUM CHLORIDE, PRESERVATIVE FREE SCH ML: 5 INJECTION INTRAVENOUS at 00:51

## 2019-04-26 RX ADMIN — ACETAMINOPHEN PRN MLS/HR: 10 INJECTION, SOLUTION INTRAVENOUS at 01:18

## 2019-04-26 RX ADMIN — RIVASTIGMINE TARTRATE SCH MG: 1.5 CAPSULE ORAL at 07:56

## 2019-04-26 RX ADMIN — CARBIDOPA AND LEVODOPA SCH TAB: 25; 100 TABLET ORAL at 13:28

## 2019-04-26 RX ADMIN — OXYCODONE PRN MG: 5 TABLET ORAL at 12:43

## 2019-04-26 RX ADMIN — SODIUM CHLORIDE, PRESERVATIVE FREE PRN ML: 5 INJECTION INTRAVENOUS at 06:21

## 2019-04-26 RX ADMIN — ACETAMINOPHEN PRN MLS/HR: 10 INJECTION, SOLUTION INTRAVENOUS at 12:42

## 2019-04-26 RX ADMIN — HYDROMORPHONE HYDROCHLORIDE PRN MG: 1 INJECTION, SOLUTION INTRAMUSCULAR; INTRAVENOUS; SUBCUTANEOUS at 01:05

## 2019-04-26 RX ADMIN — ACETAMINOPHEN PRN MLS/HR: 10 INJECTION, SOLUTION INTRAVENOUS at 07:54

## 2019-04-26 NOTE — DISCHARGE PLAN
Discharge Plan


Disposition: 01 Home, Self Care


Condition: Good


Diet: Regular


Activity Restrictions: Additional Comments (no heavy lifting or exertion)


Shower Restrictions: No


Driving Restrictions: Yes


Weight Bearing: Full Weight


Additional Instructions or Follow Up instructions: 


surgery clinic 1 week


No Smoking: If you smoke, Please STOP!  Call [phone] for help.


Follow-up with: 


Mk Flores MD [Primary Care Provider] -

## 2019-04-26 NOTE — DISCHARGE SUMMARY
Discharge Summary


Admit Date: 04/23/19


Discharge Date: 04/26/19


Discharging Provider: tigre


Code Status: Attempt Resuscitation


Condition at Discharge: Good


Discharge Disposition: 01 Home, Self Care





- DIAGNOSES


Admission Diagnoses: 





colon CA


Discharge Diagnoses with Status of Each Condition: 





resected





- HPI


History of Present Illness: 





74 yo woman admitted after elective colon resection.  Her surgery and recovery 

were uneventful and she was discharged POD 3 on regular diet, having BMs, and 

ambulating easily.





- HOSPITAL COURSE


Hospital Course: 





74 yo woman admitted after elective colon resection.  Her surgery and recovery 

were uneventful and she was discharged POD 3 on regular diet, having BMs, and 

ambulating easily.





- ALLERGIES


Allergies/Adverse Reactions: 


                                    Allergies











Allergy/AdvReac Type Severity Reaction Status Date / Time


 


No Known Drug Allergies Allergy   Verified 01/25/19 23:32














- MEDICATIONS


Home Medications: 


                                Ambulatory Orders











 Medication  Instructions  Recorded  Confirmed


 


Carbidopa/Levodopa 25/100 [Sinemet 2 tab PO QDBREAKFAST 03/18/15 04/23/19





25 mg/100 mg]   


 


Citalopram [CeleXA] 10 mg PO QDDINNER 03/18/15 04/23/19


 


Polyethylene Glycol 3350 [Miralax] 17 gm PO DAILY 11/16/16 04/23/19


 


Rivastigmine [Exelon] 1 cap PO BIDWM 06/01/17 04/23/19


 


Carbidopa/Levodopa 25/100 [Sinemet 1 tab PO 1330,1730,2100 04/23/19 04/23/19





25 mg/100 mg]   


 


Metoprolol Succinate 12.5 mg PO QDBREAKFAST 04/23/19 04/23/19


 


Ropinirole HCl 1 mg PO BIDWM 04/23/19 04/23/19














- PHYSICAL EXAM AT DISCHARGE


General Appearance: positive: No acute distress


Eyes Bilateral: positive: Normal inspection


ENT: positive: ENT inspection nml


Neck: positive: Nml inspection


Respiratory: positive: Chest non-tender


Abdomen: positive: Non-tender


Back: positive: Nml inspection


Skin: positive: Color nml


Extremities: positive: Non-tender


Neurologic/Psychiatric: positive: Oriented x3





- LABS


Result Diagrams: 


                                 04/24/19 04:25





                                 04/24/19 04:25





- FOLLOW UP


Follow Up: 





surgery 1 week





- TIME SPENT


Time Spent in Discharge (Minutes): 30

## 2019-04-30 NOTE — MISCELLANEOUS PROVIDER NOTE
Miscellaneous Provider Note





- -


Note: 


At the time of the patient's admission, I certify that it was my opinion that 

the patient would be able to go home in 96 hours or I would make a good hilda 

effort to transfer the patient.

## 2021-03-28 ENCOUNTER — HOSPITAL ENCOUNTER (OUTPATIENT)
Dept: HOSPITAL 76 - EMS | Age: 76
Discharge: ANOTHER HOSPITAL | End: 2021-03-28
Attending: EMERGENCY MEDICINE
Payer: MEDICARE

## 2021-03-28 ENCOUNTER — HOSPITAL ENCOUNTER (EMERGENCY)
Dept: HOSPITAL 76 - ED | Age: 76
LOS: 1 days | Discharge: HOME | End: 2021-03-29
Payer: MEDICARE

## 2021-03-28 DIAGNOSIS — M47.812: ICD-10-CM
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
## 2021-03-28 DIAGNOSIS — S01.81XA: Primary | ICD-10-CM
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
## 2021-03-28 DIAGNOSIS — S00.83XA: ICD-10-CM
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
## 2021-03-28 DIAGNOSIS — S09.90XA: Primary | ICD-10-CM

## 2021-03-28 DIAGNOSIS — G20: ICD-10-CM
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
## 2021-03-28 DIAGNOSIS — Z87.891: ICD-10-CM

## 2021-03-28 PROCEDURE — 99284 EMERGENCY DEPT VISIT MOD MDM: CPT

## 2021-03-28 PROCEDURE — 99282 EMERGENCY DEPT VISIT SF MDM: CPT

## 2021-03-28 NOTE — ED PHYSICIAN DOCUMENTATION
PD HPI Fall





- Stated complaint


Stated Complaint: GLf/HEAD LAC





- Chief complaint


Chief Complaint: Laceration





- History obtained from


History obtained from: Patient





- History of Present Illness


Mechanism of injury: Lost balance


Fall distance: Standing position


Where injury occurred: Home


Timing - onset: How many minutes ago (approximately 30 minutes PTA)


Pain level max: 0


Pain level now: 0


Associated symptoms: AMS (EMS reports mild AMS manifest as slow to answer some 

questions).  No: LOC


Contributing factors: No: Anticoagulated, Intoxicated





- Additional information


Additional information: 





patient is brought in by ambulance. Shortly prior to arrival, patient had 

mechanical fall due to loss of balance. patient has Parkinsons disease. Per 

EMS, patient has mild altered mental status manifest as slow to answer some 

questions. On my HPI, only c/o is mild generalized HA.





Review of Systems


Eyes: reports: Reviewed and negative





PD PAST MEDICAL HISTORY





- Past Medical History


Cardiovascular: None


Respiratory: None


Neuro: None, Parkinson's, Other


Endocrine/Autoimmune: None


GI: Ulcers


: None


HEENT: Chronic vision loss


Psych: None


Musculoskeletal: Osteoarthritis, Chronic back pain


Derm: None





- Past Surgical History


Past Surgical History: Yes


General: Colonoscopy


Ortho: Hip replacement, Rotator cuff repair





- Present Medications


Home Medications: 


                                Ambulatory Orders











 Medication  Instructions  Recorded  Confirmed


 


Carbidopa/Levodopa 25/100 [Sinemet 2 tab PO QDBREAKFAST 03/18/15 11/24/20





25 mg/100 mg]   


 


Citalopram [CeleXA] 10 mg PO QPM 03/18/15 11/24/20


 


Carbidopa/Levodopa 25/100 [Sinemet 5 tab PO 1330,1730,2100 04/23/19 11/24/20





25 mg/100 mg]   


 


Rivastigmine [Exelon 13.3MG] 1 tab PO DAILY 11/24/20 11/24/20














- Allergies


Allergies/Adverse Reactions: 


                                    Allergies











Allergy/AdvReac Type Severity Reaction Status Date / Time


 


No Known Drug Allergies Allergy   Verified 03/28/21 23:32














- Social History


Does the pt smoke?: No


Smoking Status: Former smoker


Does the pt drink ETOH?: No


Does the pt have substance abuse?: Yes





- Immunizations


Immunizations are current?: Yes





- POLST


Patient has POLST: No





PD ED PE NORMAL





- Vitals


Vital signs reviewed: Yes





- General


General: No acute distress, Well developed/nourished, Other (AAOx2)





- HEENT


HEENT: PERRL, EOMI





PD ED PE EXPANDED





- HEENT


HEENT Visual: 


                            __________________________














                            __________________________





 1 - laceration (superficial 1 cm linear laceration with mild swelling and mild 

tenderness)








Results





- Vitals


Vitals: 


                               Vital Signs - 24 hr











  03/29/21





  02:00


 


Temperature 36.4 C L


 


Heart Rate 79


 


Respiratory 12





Rate 


 


Blood Pressure 147/71 H


 


O2 Saturation 96








                                     Oxygen











O2 Source [With Activity]      Room air


 


O2 Source [Without Activity]   Room air


 


O2 Source                      Room air

















- Rads (name of study)


  ** CTH


Radiology: Prelim report reviewed, See rad report





  ** CT cervical spine


Radiology: Prelim report reviewed, See rad report





PD MEDICAL DECISION MAKING





- ED course


Complexity details: reviewed results, re-evaluated patient, considered 

differential, d/w patient





Departure





- Departure


Disposition: 01 Home, Self Care


Clinical Impression: 


Fall


Qualifiers:


 Encounter type: initial encounter Qualified Code(s): W19.XXXA - Unspecified 

fall, initial encounter





Facial contusion


Qualifiers:


 Encounter type: initial encounter Qualified Code(s): S00.83XA - Contusion of 

other part of head, initial encounter





Condition: Good


Instructions:  ED Fall Uncertain Cause, ED Head Injury Closed, ED Laceration 

Small Superf No Sutr


Discharge Date/Time: 03/29/21 03:01

## 2021-03-29 VITALS — SYSTOLIC BLOOD PRESSURE: 147 MMHG | DIASTOLIC BLOOD PRESSURE: 71 MMHG

## 2021-03-29 NOTE — CT REPORT
PROCEDURE:  CERVICAL SPINE WO

 

INDICATIONS:  fall, AMS, head injury

 

TECHNIQUE:  

Noncontrast 3 mm thick sections acquired from the skull base to the T4 level.  Sagittal and coronal r
eformats were then constructed.  For radiation dose reduction, the following was used:  automated exp
osure control, adjustment of mA and/or kV according to patient size.

 

COMPARISON:  None.

 

FINDINGS:  

Image quality:  Excellent.  

 

Bones:  No fractures or dislocations.  Visualized superior ribs are intact.  There is grade 1 anterol
isthesis, 5 mm of C4 on C5. Moderate to severe disc space narrowing is noted at C5-6, C6-7.

 

Soft tissues:  Prevertebral soft tissues are normal in thickness.  No paravertebral hematomas.  No ap
ical pneumothoraces.  4 mm medial right upper lobe nodule is present, unchanged compared to 5/20/2020


 

IMPRESSION:  

 

 

Multilevel degenerative changes without visualized fracture.

 

4 mm right upper lobe nodule unchanged since 5/20/2020.

 

The above findings are concordant with preliminary report.

 

Reviewed by: Hermelinda Alvarez MD on 3/29/2021 7:55 AM PDT

Approved by: Hermelinda Alvarez MD on 3/29/2021 7:55 AM PDT

 

 

Station ID:  [id number]

## 2021-03-29 NOTE — CT REPORT
PROCEDURE:  HEAD WO

 

INDICATIONS:  fall, head injury, AMS

 

TECHNIQUE:  

Noncontrast 4.5 mm thick angled axial sections acquired from the foramen magnum to the vertex.  For r
adiation dose reduction, the following was used:  automated exposure control, adjustment of mA and/or
 kV according to patient size.

 

COMPARISON:  None.

 

FINDINGS:  

Image quality:  Excellent.  

 

Left temporal scalp hematoma. The ventricular system and cortical sulci demonstrate atrophy, consiste
nt for patient's stated age.  There are areas of hypodensity in the periventricular and subcortical w
estella matter.  There is no acute intra or extra-axial fluid collection.  No acute hemorrhage, mass les
ion or midline shift.  Brainstem is unremarkable.  Left frontal scalp hematoma. Globes are symmetrica
l. Sinuses are aerated. Osseous structures are intact. 

 

 

IMPRESSION:  

 

1.  No acute intracranial process.

2.  Moderate atrophy and chronic microvascular ischemic changes.

3.  Left frontal scalp hematoma.

 

The above findings are concordant with preliminary report.

 

Reviewed by: Hermelinda Alvarez MD on 3/29/2021 7:52 AM PDT

Approved by: Hermelinda Alvarez MD on 3/29/2021 7:52 AM PDT

 

 

Station ID:  [id number]

## 2021-04-06 NOTE — EXTERNAL MEDICAL SUMMARY RPT
Continuity of Care Document

                            Created on:2021



Patient:FERNANDO THOMPSON

Sex:Female

:1945

External Reference #:[id number]





Demographics







                          Phone                     Unavailable

 

                          Preferred Language        Unknown

 

                          Marital Status            Unknown

 

                          Hinduism Affiliation     Unknown

 

                          Race                      Unknown

 

                          Ethnic Group              Unknown









Author







                          Organization              [facility]

 

                          Address                    [place] [address]

 

                          Phone                     [phone]









Social History







                     date                description         facility

 

                     [id number]

## 2021-05-13 ENCOUNTER — HOSPITAL ENCOUNTER (EMERGENCY)
Dept: HOSPITAL 76 - ED | Age: 76
Discharge: HOME | End: 2021-05-13
Payer: MEDICARE

## 2021-05-13 VITALS — DIASTOLIC BLOOD PRESSURE: 62 MMHG | SYSTOLIC BLOOD PRESSURE: 148 MMHG

## 2021-05-13 DIAGNOSIS — S01.01XA: ICD-10-CM

## 2021-05-13 DIAGNOSIS — R91.1: ICD-10-CM

## 2021-05-13 DIAGNOSIS — G20: ICD-10-CM

## 2021-05-13 DIAGNOSIS — S06.0X0A: Primary | ICD-10-CM
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
## 2021-05-13 DIAGNOSIS — Z87.891: ICD-10-CM

## 2021-05-13 DIAGNOSIS — M50.322: ICD-10-CM

## 2021-05-13 PROCEDURE — 99284 EMERGENCY DEPT VISIT MOD MDM: CPT

## 2021-05-13 PROCEDURE — 99283 EMERGENCY DEPT VISIT LOW MDM: CPT

## 2021-05-13 NOTE — CT REPORT
PROCEDURE:  HEAD WO

 

INDICATIONS:  head injury

 

TECHNIQUE:  

Noncontrast 4.5 mm thick angled axial sections acquired from the foramen magnum to the vertex.  For r
adiation dose reduction, the following was used:  automated exposure control, adjustment of mA and/or
 kV according to patient size.

 

COMPARISON:  Head CT without contrast, 3/29/2021.

 

FINDINGS:  

Image quality:  Excellent.  

 

CSF spaces:  Basal cisterns are patent.  No extra-axial fluid collections.  Ventricles are prominent 
but symmetric in size and shape.  

 

Brain:  No midline shift.  No intracranial masses or hemorrhage. There is mild cerebral volume loss. 
Mild periventricular white matter chronic small vessel ischemic changes are present.  

 

Skull and face:  Calvarium and visualized facial bones are intact, without suspicious lesions.  There
 is right parietal scalp laceration and contusion. Skin staples are noted in the right parietal area.


 

Sinuses:  Visualized sinuses and mastoids are clear.  

 

IMPRESSION:  

 

1. No acute intracranial abnormalities. No intracranial bleed or skull fracture.

2. Right parietal scalp laceration and contusion.

 

Reviewed by: Seda Jeffrey MD on 5/13/2021 8:40 PM PDT

Approved by: Seda Jeffrey MD on 5/13/2021 8:40 PM PDT

 

 

Station ID:  [id number]

## 2021-05-13 NOTE — ED PHYSICIAN DOCUMENTATION
PD HPI HEAD INJURY





- Stated complaint


Stated Complaint: GLF/HEAD LAC





- Chief complaint


Chief Complaint: Trauma Hd/Nk





- History obtained from


History obtained from: Patient





- Additional information


Additional information: 





75-year-old woman with Parkinson's had a ground-level mechanical fall at home 

hitting the back of her head.  She was seen at an urgent care and the laceration

on the back of her head was closed with staples.  She was referred here for 

imaging.  She denies headache.  No loss of consciousness.  Tetanus status is 

unknown.,  But review of the chart shows that she had her tetanus shot March 2015.





Review of Systems


Ten Systems: 10 systems reviewed and negative


Constitutional: reports: Reviewed and negative


Eyes: reports: Reviewed and negative


Ears: reports: Reviewed and negative


Nose: reports: Reviewed and negative





PD PAST MEDICAL HISTORY





- Past Medical History


Cardiovascular: None


Respiratory: None


Neuro: None, Parkinson's, Other


Endocrine/Autoimmune: None


GI: Ulcers


: None


HEENT: Chronic vision loss


Psych: None


Musculoskeletal: Osteoarthritis, Chronic back pain


Derm: None





- Past Surgical History


Past Surgical History: Yes


General: Colonoscopy


Ortho: Hip replacement, Rotator cuff repair





- Present Medications


Home Medications: 


                                Ambulatory Orders











 Medication  Instructions  Recorded  Confirmed


 


Carbidopa/Levodopa 25/100 [Sinemet 2 tab PO QDBREAKFAST 03/18/15 05/04/21





25 mg/100 mg]   


 


Citalopram [CeleXA] 10 mg PO QPM 03/18/15 05/04/21


 


Carbidopa/Levodopa 25/100 [Sinemet 5 tab PO 1330,1730,2100 04/23/19 05/04/21





25 mg/100 mg]   


 


Rivastigmine [Exelon 13.3MG] 1 tab PO DAILY 11/24/20 05/04/21














- Allergies


Allergies/Adverse Reactions: 


                                    Allergies











Allergy/AdvReac Type Severity Reaction Status Date / Time


 


No Known Drug Allergies Allergy   Verified 05/13/21 17:38














- Social History


Does the pt smoke?: No


Smoking Status: Former smoker


Does the pt drink ETOH?: No


Does the pt have substance abuse?: Yes





- Immunizations


Immunizations are current?: Yes





- POLST


Patient has POLST: No





PD ED PE NORMAL





- Vitals


Vital signs reviewed: Yes





- General


General: Alert and oriented X 3, No acute distress





- HEENT


HEENT: PERRL, EOMI, Other (Stapled laceration on the right occiput)





- Neck


Neck: No bony TTP





- Neuro


Neuro: Alert and oriented X 3, Normal speech





Results





- Vitals


Vitals: 


                               Vital Signs - 24 hr











  05/13/21 05/13/21





  17:31 19:38


 


Temperature 36.5 C 


 


Heart Rate 74 75


 


Respiratory 14 16





Rate  


 


Blood Pressure 155/69 H 154/66 H


 


O2 Saturation 99 96








                                     Oxygen











O2 Source [With Activity]      Room air


 


O2 Source [Without Activity]   Room air


 


O2 Source                      Room air

















- Rads (name of study)


  ** CT Head/Cspine


Radiology: EMP read contemporaneously (Degenerative changes, 4 mm right upper 

lobe nodule, right parietal scalp laceration)





PD MEDICAL DECISION MAKING





- ED course


ED course: 





75-year-old woman presents after being referred from urgent care after a scalp 

laceration.  Already had the laceration closed there and CT imaging was obtained

and negative.





Departure





- Departure


Disposition: 01 Home, Self Care


Clinical Impression: 


 Concussion, Parkinson disease, Injury of head and neck





Condition: Good


Record reviewed to determine appropriate education?: Yes


Instructions:  ED Head Injury Closed


Comments: 


On the CT we did note a 4 mm right upper lobe pulmonary nodule.  You should have

a repeat CT in 12 months to assess stability.  Otherwise follow-up wound care 

instructions and other instructions as given by the urgent care today regarding 

wound care and staple removal.

## 2021-05-13 NOTE — CT REPORT
PROCEDURE:  CERVICAL SPINE WO

 

INDICATIONS:  head injury

 

TECHNIQUE:  

Noncontrast 3 mm thick sections acquired from the skull base to the T4 level.  Sagittal and coronal r
eformats were then constructed.  For radiation dose reduction, the following was used:  automated exp
osure control, adjustment of mA and/or kV according to patient size.

 

COMPARISON:  CT cervical spine without, 3/29/2021.

 

FINDINGS:  

Image quality:  Excellent.  

 

Bones:  No fractures or dislocations.  There is grade 1 anterolisthesis of C4 on C5. Degenerative dis
c disease is present, moderate at C5-C6 and C6-C7, mild at other levels. There is lateral facet arthr
opathy, severe at C3-C4 on the left, and C4-C5 bilaterally. Moderate atlantoaxial joint degeneration.
 Visualized superior ribs are intact.  

 

Soft tissues:  Prevertebral soft tissues are normal in thickness.  No paravertebral hematomas.  No ap
ical pneumothoraces.  Again noted is a 4 mm nodule in the right upper lobe (series 6 image 71/75).

 

IMPRESSION:  

 

1. No cervical spine fractures.

2. Degenerative disc and facet disease in cervical spine.

3. A 4 mm right upper lobe nodule is again noted. A nonemergent follow-up chest CT is suggested in 12
 months.

 

Reviewed by: Seda Jeffrey MD on 5/13/2021 8:47 PM PDT

Approved by: Seda Jeffrey MD on 5/13/2021 8:47 PM PDT

 

 

Station ID:  [id number]

## 2021-09-20 ENCOUNTER — HOSPITAL ENCOUNTER (OUTPATIENT)
Dept: HOSPITAL 76 - EMS | Age: 76
Discharge: ANOTHER HOSPITAL | End: 2021-09-20
Payer: MEDICARE

## 2021-09-20 DIAGNOSIS — M25.561: ICD-10-CM

## 2021-09-20 DIAGNOSIS — S60.211A: ICD-10-CM

## 2021-09-20 DIAGNOSIS — M25.551: ICD-10-CM

## 2021-09-20 DIAGNOSIS — Z04.3: Primary | ICD-10-CM

## 2021-09-20 DIAGNOSIS — M54.2: ICD-10-CM
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
## 2021-09-20 DIAGNOSIS — M79.641: ICD-10-CM
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
## 2021-09-22 ENCOUNTER — HOSPITAL ENCOUNTER (EMERGENCY)
Dept: HOSPITAL 76 - ED | Age: 76
Discharge: HOME | End: 2021-09-22
Payer: MEDICARE

## 2021-09-22 ENCOUNTER — HOSPITAL ENCOUNTER (OUTPATIENT)
Dept: HOSPITAL 76 - EMS | Age: 76
Discharge: ANOTHER HOSPITAL | End: 2021-09-22
Payer: MEDICARE

## 2021-09-22 VITALS — DIASTOLIC BLOOD PRESSURE: 67 MMHG | SYSTOLIC BLOOD PRESSURE: 143 MMHG
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
## 2021-09-22 DIAGNOSIS — S09.92XA: Primary | ICD-10-CM
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
## 2021-09-22 DIAGNOSIS — S02.2XXA: Primary | ICD-10-CM

## 2021-09-22 PROCEDURE — 99284 EMERGENCY DEPT VISIT MOD MDM: CPT

## 2021-09-22 PROCEDURE — 99283 EMERGENCY DEPT VISIT LOW MDM: CPT

## 2021-09-22 NOTE — CT REPORT
PROCEDURE:  MAXILLOFACIAL WO

 

INDICATIONS:  fall, facial (nasal) injury and deformity

 

TECHNIQUE:  

Noncontrast 1.5 mm thick axial images acquired from the mandible through the frontal sinuses, with co
maylin and sagittal reformatting.  For radiation dose reduction, the following was used:  automated ex
posure control, adjustment of mA and/or kV according to patient size.

 

COMPARISON:  None.

 

FINDINGS:  

Image quality:  Excellent.  

 

Bones and teeth:  Orbital walls are intact.  Sinus walls show no fracture or deformity.  Nasal bones 
demonstrate a distal left nondisplaced fracture. Visualized portions of the mandible demonstrate no f
ractures or subluxation.  Zygomatic arches are intact.  Pterygoid plates are intact.  Visualized port
ions of the skull base and auditory canals are intact.  

 

Sinuses: Mild frothy fluid is present in the left frontal sinus. Scattered areas of mucosal thickenin
g are present within the ethmoid air cells. Minimal bilateral maxillary sinus fluid is present.

 

Soft tissues: Soft tissue edema is present within the nasal soft tissues. Vascular:  Visualized vascu
lar structures appear normal in the absence of contrast.  Bony vascular foramina and canals are intac
t.  

 

IMPRESSION:  

 

 

1. Nondisplaced distal nasal bone fracture with overlying soft tissue edema.

 

The above findings are concordant with preliminary report.

 

Reviewed by: Hermelinda Alvarez MD on 9/22/2021 8:18 AM PDT

Approved by: Hermelinda Alvarez MD on 9/22/2021 8:18 AM PDT

 

 

Station ID:  [id number]

## 2021-09-22 NOTE — ED PHYSICIAN DOCUMENTATION
PD HPI Fall





- Stated complaint


Stated Complaint: GLF





- History obtained from


History obtained from: Patient, EMS





- History of Present Illness


Mechanism of injury: Other (fell out of bed)


Fall distance: From bed


Where injury occurred: Home


Timing - onset: How many minutes ago (approximately 20-30 minutes PTA)


Injury(ies) location: Face


Quality of pain: Pain


Associated symptoms: No: LOC, AMS, Neck pain


Contributing factors: No: Anticoagulated


Recently seen: Emergency Dept





- Additional information


Additional information: 





BIBA.


Patient fell yesterday and was T+R from [place] ED, RUE splint placed for 

likely wrist frature. Tonight, patient fell out of bed, struck face on the 

floor. She presents c/o nasal pain and swelling, as well as right shoulder pain





Review of Systems


Eyes: denies: Loss of vision, Decreased vision


Nose: reports: Epistaxis.  denies: Sinus pressure / pain


Throat: denies: Dental pain / toothache


Cardiac: reports: Reviewed and negative


Respiratory: reports: Reviewed and negative


GI: reports: Reviewed and negative


Musculoskeletal: reports: Joint pain (right shoulder)


Neurologic: denies: Generalized weakness, Focal weakness, Numbness, Headache





PD PAST MEDICAL HISTORY





- Past Medical History


Past Medical History: Yes


Cardiovascular: Hypertension, High cholesterol


Neuro: Parkinson's





- Present Medications


Home Medications: 


                                Ambulatory Orders











 Medication  Instructions  Recorded  Confirmed


 


Carbidopa/Levodopa 25/100 [Sinemet 2 tab PO QDBREAKFAST 03/18/15 09/22/21





25 mg/100 mg]   


 


Citalopram [CeleXA] 10 mg PO QPM 03/18/15 09/22/21


 


Carbidopa/Levodopa 25/100 [Sinemet 5 tab PO 1330,1730,2100 04/23/19 09/22/21





25 mg/100 mg]   


 


Rivastigmine [Exelon 13.3MG] 1 tab PO DAILY 11/24/20 09/22/21


 


Aspirin [[place] Aspirin] 81 mg PO DAILY 09/22/21 09/22/21


 


Atorvastatin [Lipitor] 20 mg PO DAILY 09/22/21 09/22/21


 


Metoprolol Succinate [Toprol Xl] 25 mg PO DAILY 09/22/21 09/22/21


 


Quetiapine Fumarate [Seroquel] 25 mg PO DAILY 09/22/21 09/22/21














- Allergies


Allergies/Adverse Reactions: 


                                    Allergies











Allergy/AdvReac Type Severity Reaction Status Date / Time


 


No Known Drug Allergies Allergy   Verified 09/22/21 04:56














PD ED PE NORMAL





- Vitals


Vital signs reviewed: Yes





- General


General: Alert and oriented X 3, No acute distress, Well developed/nourished





- HEENT


HEENT: PERRL, EOMI, Moist mucous membranes, Pharynx benign, Dentition benign, 

Other (nasal swelling, tenderness, dried blood in both nares (L>R), abrasion to 

nasal bridge, echymosis and swelling left supraorbit)





- Neck


Neck: Supple, no meningeal sign, No bony TTP





- Cardiac


Cardiac: RRR, No murmur





- Respiratory


Respiratory: No respiratory distress, Clear bilaterally





- Abdomen


Abdomen: Soft, Non tender





- Extremities


Extremities: Other (RUE in splint; right fingers are all swollen, echymotic, but

 LTS intact and able to move all five right fingers)





- Neuro


Neuro: Alert and oriented X 3, CNs 2-12 intact, No motor deficit, No sensory 

deficit, Normal speech


Eye Opening: Spontaneous


Motor: Obeys Commands


Verbal: Oriented


GCS Score: 15





Results





- Vitals


Vitals: 


                               Vital Signs - 24 hr











  09/22/21 09/22/21 09/22/21





  04:56 04:59 05:52


 


Temperature 36.3 C L 36.3 C L 


 


Heart Rate 72 72 72


 


Respiratory 15 15 16





Rate   


 


Blood Pressure 141/62 H 141/62 H 138/65 H


 


O2 Saturation 98 98 99














  09/22/21 09/22/21





  06:46 07:00


 


Temperature 36.5 C 36.5 C


 


Heart Rate 79 79


 


Respiratory 15 15





Rate  


 


Blood Pressure 143/67 H 143/67 H


 


O2 Saturation 99 99








                                     Oxygen











O2 Source [With Activity]      Room air


 


O2 Source [Without Activity]   Room air


 


O2 Source                      Room air

















- Rads (name of study)


  ** right shoulder xrays


Radiology: Prelim report reviewed, See rad report





  ** maxillofacial CT


Radiology: Prelim report reviewed, See rad report





PD MEDICAL DECISION MAKING





- ED course


Complexity details: reviewed results, re-evaluated patient, considered 

differential, d/w patient


ED course: 





no acute findings on right shoulder xrays. Maxilofacial CT only shows minor 

nasal fracture, minimal/no displacement. On reevaluation,, results d/w patient. 

She is still awake, alert, interacts appropriately. She is comfortable with d/c 

home, will return if worse. 





Departure





- Departure


Disposition: 01 Home, Self Care


Clinical Impression: 


 Nasal fracture





Condition: Good


Instructions:  ED Fx Nasal Conf W X Ray


Follow-Up: 


Mk Flores MD [Primary Care Provider] - 


Comments: 


The nasal fracture seen on the CT scan appears minor and without significant 

displacement; as the swelling goes down over the next several days, your nose 

should return to its previous appearance.


Discharge Date/Time: 09/22/21 08:00

## 2021-09-22 NOTE — XRAY REPORT
PROCEDURE:  Shoulder 2 View RT

 

INDICATIONS:  fall, shoulder pain and tenderness

 

TECHNIQUE:  2 views of the shoulder were acquired.  

 

COMPARISON:  Shoulder x-ray 7/10/2018

 

FINDINGS:  

 

Bones:  No fractures or dislocations.  No suspicious bony lesions.  Visualized ribs appear intact.  P
rominent glenohumeral and acromioclavicular degenerative narrowing.

 

Soft tissues:  No suspicious soft tissue calcifications.  

 

IMPRESSION:  No visualized acute fracture or dislocation. However, occult injury cannot be excluded. 
Recommend short interval imaging follow-up in 7-10 days as clinically indicated for additional evalua
tion.

 

The above findings are concordant with preliminary report.

 

Reviewed by: Hermelinda Alvarez MD on 9/22/2021 8:14 AM PDT

Approved by: Hermelinda Alvarez MD on 9/22/2021 8:14 AM PDT

 

 

Station ID:  [id number]

## 2021-10-03 ENCOUNTER — HOSPITAL ENCOUNTER (OUTPATIENT)
Dept: HOSPITAL 76 - EMS | Age: 76
Discharge: ANOTHER HOSPITAL | End: 2021-10-03
Payer: MEDICARE

## 2021-10-03 ENCOUNTER — HOSPITAL ENCOUNTER (OUTPATIENT)
Dept: HOSPITAL 76 - EMS | Age: 76
End: 2021-10-03
Payer: MEDICARE

## 2021-10-03 ENCOUNTER — HOSPITAL ENCOUNTER (EMERGENCY)
Dept: HOSPITAL 76 - ED | Age: 76
Discharge: HOME | End: 2021-10-03
Payer: MEDICARE

## 2021-10-03 VITALS — DIASTOLIC BLOOD PRESSURE: 68 MMHG | SYSTOLIC BLOOD PRESSURE: 135 MMHG

## 2021-10-03 DIAGNOSIS — S60.221A: Primary | ICD-10-CM
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
## 2021-10-03 DIAGNOSIS — S00.03XA: ICD-10-CM

## 2021-10-03 DIAGNOSIS — S40.012A: Primary | ICD-10-CM

## 2021-10-03 DIAGNOSIS — R41.82: ICD-10-CM

## 2021-10-03 DIAGNOSIS — I95.9: Primary | ICD-10-CM
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
## 2021-10-03 DIAGNOSIS — I10: ICD-10-CM

## 2021-10-03 DIAGNOSIS — R41.0: ICD-10-CM

## 2021-10-03 DIAGNOSIS — G20: ICD-10-CM
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
## 2021-10-03 DIAGNOSIS — Z79.82: ICD-10-CM

## 2021-10-03 LAB
ALBUMIN DIAFP-MCNC: 3.2 G/DL (ref 3.2–5.5)
ALBUMIN/GLOB SERPL: 1.8 {RATIO} (ref 1–2.2)
ALP SERPL-CCNC: 76 IU/L (ref 42–121)
ALT SERPL W P-5'-P-CCNC: < 10 IU/L (ref 10–60)
ANION GAP SERPL CALCULATED.4IONS-SCNC: 7 MMOL/L (ref 6–13)
AST SERPL W P-5'-P-CCNC: 14 IU/L (ref 10–42)
BASOPHILS NFR BLD AUTO: 0.1 10^3/UL (ref 0–0.1)
BASOPHILS NFR BLD AUTO: 0.9 %
BILIRUB BLD-MCNC: 1.1 MG/DL (ref 0.2–1)
BUN SERPL-MCNC: 13 MG/DL (ref 6–20)
CALCIUM UR-MCNC: 7.7 MG/DL (ref 8.5–10.3)
CHLORIDE SERPL-SCNC: 112 MMOL/L (ref 101–111)
CO2 SERPL-SCNC: 21 MMOL/L (ref 21–32)
CREAT SERPLBLD-SCNC: 0.6 MG/DL (ref 0.4–1)
EOSINOPHIL # BLD AUTO: 0.1 10^3/UL (ref 0–0.7)
EOSINOPHIL NFR BLD AUTO: 1.1 %
ERYTHROCYTE [DISTWIDTH] IN BLOOD BY AUTOMATED COUNT: 14 % (ref 12–15)
GFRSERPLBLD MDRD-ARVRAT: 97 ML/MIN/{1.73_M2} (ref 89–?)
GLOBULIN SER-MCNC: 1.8 G/DL (ref 2.1–4.2)
GLUCOSE SERPL-MCNC: 105 MG/DL (ref 70–100)
HCT VFR BLD AUTO: 32.9 % (ref 37–47)
HGB UR QL STRIP: 10.3 G/DL (ref 12–16)
LIPASE SERPL-CCNC: 29 U/L (ref 22–51)
LYMPHOCYTES # SPEC AUTO: 0.8 10^3/UL (ref 1.5–3.5)
LYMPHOCYTES NFR BLD AUTO: 11.1 %
MCH RBC QN AUTO: 30.8 PG (ref 27–31)
MCHC RBC AUTO-ENTMCNC: 31.3 G/DL (ref 32–36)
MCV RBC AUTO: 98.5 FL (ref 81–99)
MONOCYTES # BLD AUTO: 0.5 10^3/UL (ref 0–1)
MONOCYTES NFR BLD AUTO: 7.1 %
NEUTROPHILS # BLD AUTO: 5.9 10^3/UL (ref 1.5–6.6)
NEUTROPHILS # SNV AUTO: 7.4 X10^3/UL (ref 4.8–10.8)
NEUTROPHILS NFR BLD AUTO: 79.7 %
NRBC # BLD AUTO: 0 /100WBC
NRBC # BLD AUTO: 0 X10^3/UL
PDW BLD AUTO: 8.9 FL (ref 7.9–10.8)
PLATELET # BLD: 177 10^3/UL (ref 130–450)
POTASSIUM SERPL-SCNC: 3.8 MMOL/L (ref 3.5–5)
PROT SPEC-MCNC: 5 G/DL (ref 6.7–8.2)
RBC MAR: 3.34 10^6/UL (ref 4.2–5.4)
SODIUM SERPLBLD-SCNC: 140 MMOL/L (ref 135–145)

## 2021-10-03 PROCEDURE — 36415 COLL VENOUS BLD VENIPUNCTURE: CPT

## 2021-10-03 PROCEDURE — 99284 EMERGENCY DEPT VISIT MOD MDM: CPT

## 2021-10-03 PROCEDURE — 83690 ASSAY OF LIPASE: CPT

## 2021-10-03 PROCEDURE — 80053 COMPREHEN METABOLIC PANEL: CPT

## 2021-10-03 PROCEDURE — 99283 EMERGENCY DEPT VISIT LOW MDM: CPT

## 2021-10-03 PROCEDURE — 85025 COMPLETE CBC W/AUTO DIFF WBC: CPT

## 2021-10-03 NOTE — XRAY REPORT
PROCEDURE:  Hand 3 View RT

 

INDICATIONS:  fall/swelling/injury

 

TECHNIQUE:  3 views of the hand(s) acquired.  

 

COMPARISON:  None

 

FINDINGS:  

 

Bones:  No fractures or dislocations.  No suspicious bony lesions.  Diffuse IP degenerative narrowing
.

 

Soft tissues:  No suspicious soft tissue calcifications.  

 

IMPRESSION:  

No visualized acute fracture or dislocation. However, occult injury cannot be excluded. Recommend carlos
rt interval imaging follow-up in 7-10 days as clinically indicated for additional evaluation.

 

Reviewed by: Hermelinda Alvarez MD on 10/3/2021 12:28 PM PDT

Approved by: Hermelinda Alvarez MD on 10/3/2021 12:28 PM PDT

 

 

Station ID:  [id number]

## 2021-10-03 NOTE — ED PHYSICIAN DOCUMENTATION
History of Present Illness





- Stated complaint


Stated Complaint: AMS





- Chief complaint


Chief Complaint: Neuro





- History obtained from


History obtained from: Patient, EMS





- Additonal information


Additional information: 


Pt brought to the ED by EMS for ALOC.  She has had a couple falls in the past 

month.  Per EMS, staff felt pt was not acting like her usual self, in that she 

was not as alert as usual.  No focal deficits.  Pt denies pain or SOB.  No 

nausea.  No dysuria.








Review of Systems


Ten Systems: 10 systems reviewed and negative


Constitutional: reports: Reviewed and negative


Eyes: reports: Reviewed and negative


Ears: reports: Reviewed and negative


Nose: reports: Reviewed and negative


Throat: reports: Reviewed and negative


Cardiac: reports: Reviewed and negative


Respiratory: reports: Reviewed and negative


GI: reports: Reviewed and negative


: reports: Reviewed and negative


Skin: reports: Reviewed and negative


Musculoskeletal: reports: Reviewed and negative


Neurologic: reports: Reviewed and negative


Psychiatric: reports: Reviewed and negative


Endocrine: reports: Reviewed and negative


Immunocompromised: reports: Reviewed and negative





PD PAST MEDICAL HISTORY





- Past Medical History


Cardiovascular: Hypertension, High cholesterol


Respiratory: None


Neuro: Parkinson's


Endocrine/Autoimmune: None


GI: Ulcers


GYN: None


: None


HEENT: Chronic vision loss


Psych: None


Musculoskeletal: Osteoarthritis, Chronic back pain


Derm: None





- Past Surgical History


Past Surgical History: Yes


General: Colonoscopy


Ortho: Hip replacement, Rotator cuff repair





- Present Medications


Home Medications: 


                                Ambulatory Orders











 Medication  Instructions  Recorded  Confirmed


 


Carbidopa/Levodopa 25/100 [Sinemet 2 tab PO QDBREAKFAST 03/18/15 10/03/21





25 mg/100 mg]   


 


Citalopram [CeleXA] 10 mg PO QPM 03/18/15 10/03/21


 


Carbidopa/Levodopa 25/100 [Sinemet 5 tab PO 1330,1730,2100 04/23/19 10/03/21





25 mg/100 mg]   


 


Rivastigmine [Exelon 13.3MG] 1 tab PO DAILY 11/24/20 10/03/21


 


Aspirin [[place] Aspirin] 81 mg PO DAILY 09/22/21 10/03/21


 


Atorvastatin [Lipitor] 20 mg PO DAILY 09/22/21 10/03/21


 


Metoprolol Succinate [Toprol Xl] 25 mg PO DAILY 09/22/21 10/03/21


 


Quetiapine Fumarate [Seroquel] 25 mg PO DAILY 09/22/21 10/03/21














- Allergies


Allergies/Adverse Reactions: 


                                    Allergies











Allergy/AdvReac Type Severity Reaction Status Date / Time


 


No Known Drug Allergies Allergy   Verified 10/03/21 11:08














- Social History


Does the pt smoke?: No


Smoking Status: Never smoker


Does the pt drink ETOH?: No


Does the pt have substance abuse?: Yes





- Immunizations


Immunizations are current?: Yes





- POLST


Patient has POLST: No





PD ED PE NORMAL





- Vitals


Vital signs reviewed: Yes





- General


General: No acute distress, Well developed/nourished, Other (Pt is alert, 

answers most questions appropriately.  )





- HEENT


HEENT: Atraumatic, PERRL, EOMI, Moist mucous membranes





- Neck


Neck: Supple, no meningeal sign





- Cardiac


Cardiac: RRR, No murmur





- Respiratory


Respiratory: No respiratory distress, Clear bilaterally





- Abdomen


Abdomen: Soft, Non tender, Non distended





- Back


Back: No CVA TTP





- Derm


Derm: Normal color, Warm and dry, No rash





- Extremities


Extremities: No deformity, Other (Focal swelling and bruising on dorsal R hand. 

 No deformity.)





- Neuro


Neuro: CNs 2-12 intact, No motor deficit, No sensory deficit, Normal speech, 

Other (Alert, oriented to self and place.  No focal deficits otherwise.)





- Psych


Psych: Normal mood, Normal affect





Results





- Vitals


Vitals: 


                                     Oxygen











O2 Source [With Activity]      Room air


 


O2 Source [Without Activity]   Room air


 


O2 Source                      Room air

















- Labs


Labs: 


                                Laboratory Tests











  10/03/21 10/03/21





  11:44 11:44


 


WBC  7.4 


 


RBC  3.34 L 


 


Hgb  10.3 L 


 


Hct  32.9 L 


 


MCV  98.5 


 


MCH  30.8 


 


MCHC  31.3 L 


 


RDW  14.0 


 


Plt Count  177 


 


MPV  8.9 


 


Neut # (Auto)  5.9 


 


Lymph # (Auto)  0.8 L 


 


Mono # (Auto)  0.5 


 


Eos # (Auto)  0.1 


 


Baso # (Auto)  0.1 


 


Absolute Nucleated RBC  0.00 


 


Nucleated RBC %  0.0 


 


Sodium   140


 


Potassium   3.8


 


Chloride   112 H


 


Carbon Dioxide   21


 


Anion Gap   7.0


 


BUN   13


 


Creatinine   0.6


 


Estimated GFR (MDRD)   97


 


Glucose   105 H


 


Calcium   7.7 L


 


Total Bilirubin   1.1 H


 


AST   14


 


ALT   < 10 L


 


Alkaline Phosphatase   76


 


Total Protein   5.0 L


 


Albumin   3.2


 


Globulin   1.8 L


 


Albumin/Globulin Ratio   1.8


 


Lipase   29














- Rads (name of study)


  ** R hand XR


Radiology: Final report received, EMP read indepedently, See rad report (neg)





PD MEDICAL DECISION MAKING





- ED course


Complexity details: reviewed results, re-evaluated patient, considered 

differential, d/w patient


ED course: 





Pt was actually fairly well-appearing, and her work-up was negative.  She did 

not have any focal neurologic deficits, and I did not feel her sx were 

consistent with a stroke.  Pt has been hydrated in the ED.  She is stable for 

d/c home.  We have discussed the usual indications for return.





Departure





- Departure


Disposition: 01 Home, Self Care


Clinical Impression: 


Fall


Qualifiers:


 Encounter type: initial encounter Qualified Code(s): W19.XXXA - Unspecified 

fall, initial encounter





Contusion


Qualifiers:


 Encounter type: initial encounter Contusion area: hand Laterality: right [place]

lified Code(s): S60.221A - Contusion of right hand, initial encounter





Condition: Stable


Instructions:  Falls Risks Prevent, ED Contusion Hand


Discharge Date/Time: 10/03/21 14:26

## 2021-10-08 ENCOUNTER — HOSPITAL ENCOUNTER (OUTPATIENT)
Dept: HOSPITAL 76 - EMS | Age: 76
Discharge: ANOTHER HOSPITAL | End: 2021-10-08
Payer: MEDICARE

## 2021-10-08 ENCOUNTER — HOSPITAL ENCOUNTER (EMERGENCY)
Dept: HOSPITAL 76 - ED | Age: 76
Discharge: HOME | End: 2021-10-08
Payer: MEDICARE

## 2021-10-08 VITALS — DIASTOLIC BLOOD PRESSURE: 63 MMHG | SYSTOLIC BLOOD PRESSURE: 142 MMHG

## 2021-10-08 DIAGNOSIS — M50.30: ICD-10-CM

## 2021-10-08 DIAGNOSIS — M25.511: ICD-10-CM
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
## 2021-10-08 DIAGNOSIS — Z79.82: ICD-10-CM
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
## 2021-10-08 DIAGNOSIS — G20: ICD-10-CM

## 2021-10-08 DIAGNOSIS — M25.551: ICD-10-CM

## 2021-10-08 DIAGNOSIS — Z04.3: Primary | ICD-10-CM
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
## 2021-10-08 DIAGNOSIS — I10: ICD-10-CM

## 2021-10-08 DIAGNOSIS — S62.643A: Primary | ICD-10-CM

## 2021-10-08 DIAGNOSIS — Z91.81: ICD-10-CM

## 2021-10-08 DIAGNOSIS — Z96.641: ICD-10-CM

## 2021-10-08 LAB
ALBUMIN DIAFP-MCNC: 3.6 G/DL (ref 3.2–5.5)
ALBUMIN/GLOB SERPL: 1.6 {RATIO} (ref 1–2.2)
ALP SERPL-CCNC: 90 IU/L (ref 42–121)
ALT SERPL W P-5'-P-CCNC: < 10 IU/L (ref 10–60)
ANION GAP SERPL CALCULATED.4IONS-SCNC: 10 MMOL/L (ref 6–13)
AST SERPL W P-5'-P-CCNC: 13 IU/L (ref 10–42)
BASOPHILS NFR BLD AUTO: 0.1 10^3/UL (ref 0–0.1)
BASOPHILS NFR BLD AUTO: 1.2 %
BILIRUB BLD-MCNC: 1.1 MG/DL (ref 0.2–1)
BUN SERPL-MCNC: 12 MG/DL (ref 6–20)
CALCIUM UR-MCNC: 8.8 MG/DL (ref 8.5–10.3)
CHLORIDE SERPL-SCNC: 104 MMOL/L (ref 101–111)
CO2 SERPL-SCNC: 28 MMOL/L (ref 21–32)
CREAT SERPLBLD-SCNC: 0.7 MG/DL (ref 0.4–1)
EOSINOPHIL # BLD AUTO: 0.2 10^3/UL (ref 0–0.7)
EOSINOPHIL NFR BLD AUTO: 2.7 %
ERYTHROCYTE [DISTWIDTH] IN BLOOD BY AUTOMATED COUNT: 14.4 % (ref 12–15)
GFRSERPLBLD MDRD-ARVRAT: 81 ML/MIN/{1.73_M2} (ref 89–?)
GLOBULIN SER-MCNC: 2.3 G/DL (ref 2.1–4.2)
GLUCOSE SERPL-MCNC: 97 MG/DL (ref 70–100)
HCT VFR BLD AUTO: 34.1 % (ref 37–47)
HGB UR QL STRIP: 11.1 G/DL (ref 12–16)
LIPASE SERPL-CCNC: 30 U/L (ref 22–51)
LYMPHOCYTES # SPEC AUTO: 1.5 10^3/UL (ref 1.5–3.5)
LYMPHOCYTES NFR BLD AUTO: 25.1 %
MCH RBC QN AUTO: 31.6 PG (ref 27–31)
MCHC RBC AUTO-ENTMCNC: 32.6 G/DL (ref 32–36)
MCV RBC AUTO: 97.2 FL (ref 81–99)
MONOCYTES # BLD AUTO: 0.7 10^3/UL (ref 0–1)
MONOCYTES NFR BLD AUTO: 11.6 %
NEUTROPHILS # BLD AUTO: 3.5 10^3/UL (ref 1.5–6.6)
NEUTROPHILS # SNV AUTO: 5.9 X10^3/UL (ref 4.8–10.8)
NEUTROPHILS NFR BLD AUTO: 59.2 %
NRBC # BLD AUTO: 0 /100WBC
NRBC # BLD AUTO: 0 X10^3/UL
PDW BLD AUTO: 8.5 FL (ref 7.9–10.8)
PLATELET # BLD: 197 10^3/UL (ref 130–450)
POTASSIUM SERPL-SCNC: 3.6 MMOL/L (ref 3.5–5)
PROT SPEC-MCNC: 5.9 G/DL (ref 6.7–8.2)
RBC MAR: 3.51 10^6/UL (ref 4.2–5.4)
SODIUM SERPLBLD-SCNC: 142 MMOL/L (ref 135–145)

## 2021-10-08 PROCEDURE — 36415 COLL VENOUS BLD VENIPUNCTURE: CPT

## 2021-10-08 PROCEDURE — 72125 CT NECK SPINE W/O DYE: CPT

## 2021-10-08 PROCEDURE — 99284 EMERGENCY DEPT VISIT MOD MDM: CPT

## 2021-10-08 PROCEDURE — 73030 X-RAY EXAM OF SHOULDER: CPT

## 2021-10-08 PROCEDURE — 85025 COMPLETE CBC W/AUTO DIFF WBC: CPT

## 2021-10-08 PROCEDURE — 73130 X-RAY EXAM OF HAND: CPT

## 2021-10-08 PROCEDURE — 80053 COMPREHEN METABOLIC PANEL: CPT

## 2021-10-08 PROCEDURE — 83690 ASSAY OF LIPASE: CPT

## 2021-10-08 PROCEDURE — 73502 X-RAY EXAM HIP UNI 2-3 VIEWS: CPT

## 2021-10-08 NOTE — ED PHYSICIAN DOCUMENTATION
History of Present Illness





- Stated complaint


Stated Complaint: GLF





- Chief complaint


Chief Complaint: Trauma Ext





- Additonal information


Additional information: 





75-year-old female who is a resident of [facility] and carries a history of Pa

jenseninson's presents to the emergency department after a ground-level fall.  She 

has a history of increasing fall frequency and has been referred for physical 

therapy.  She typically uses a walker for ambulation.





This morning she was found in the hallway of the facility on the ground without 

her walker.  There was no loss of consciousness.  She denied injury at that time

and was able to get up and pivot to a wheelchair.  However as the morning 

progressed she began to complain of her right shoulder and right hip pain thus 

she presents to the emergency department.





There was no loss of consciousness.  She is not anticoagulated and she has full 

recall of the events.








Review of Systems


Constitutional: reports: Reviewed and negative


Ears: reports: Reviewed and negative


Nose: reports: Reviewed and negative


Throat: reports: Reviewed and negative


Cardiac: reports: Reviewed and negative


Respiratory: reports: Reviewed and negative


GI: reports: Reviewed and negative


: reports: Reviewed and negative


Skin: denies: Rash, Lesions


Musculoskeletal: reports: Neck pain, Joint pain


Neurologic: reports: Reviewed and negative





PD PAST MEDICAL HISTORY





- Past Medical History


Cardiovascular: Hypertension, High cholesterol


Respiratory: None


Neuro: Parkinson's


Endocrine/Autoimmune: None


GI: Ulcers


GYN: None


: None


HEENT: Chronic vision loss


Psych: None


Musculoskeletal: Osteoarthritis, Chronic back pain


Derm: None





- Past Surgical History


Past Surgical History: Yes


General: Colonoscopy


Ortho: Hip replacement, Rotator cuff repair





- Present Medications


Home Medications: 


                                Ambulatory Orders











 Medication  Instructions  Recorded  Confirmed


 


Carbidopa/Levodopa 25/100 [Sinemet 2 tab PO QDBREAKFAST 03/18/15 10/03/21





25 mg/100 mg]   


 


Citalopram [CeleXA] 10 mg PO QPM 03/18/15 10/03/21


 


Carbidopa/Levodopa 25/100 [Sinemet 5 tab PO 1330,1730,2100 04/23/19 10/03/21





25 mg/100 mg]   


 


Rivastigmine [Exelon 13.3MG] 1 tab PO DAILY 11/24/20 10/03/21


 


Aspirin [[place] Aspirin] 81 mg PO DAILY 09/22/21 10/03/21


 


Atorvastatin [Lipitor] 20 mg PO DAILY 09/22/21 10/03/21


 


Metoprolol Succinate [Toprol Xl] 25 mg PO DAILY 09/22/21 10/03/21


 


Quetiapine Fumarate [Seroquel] 25 mg PO DAILY 09/22/21 10/03/21


 


Acetaminophen [Tylenol] 650 mg PO Q8H PRN #30 tab 10/08/21 














- Allergies


Allergies/Adverse Reactions: 


                                    Allergies











Allergy/AdvReac Type Severity Reaction Status Date / Time


 


No Known Drug Allergies Allergy   Verified 10/08/21 12:55














- Social History


Does the pt smoke?: No


Smoking Status: Never smoker


Does the pt drink ETOH?: No


Does the pt have substance abuse?: Yes





- Immunizations


Immunizations are current?: Yes





- POLST


Patient has POLST: No





PD ED PE EXPANDED





- General


General: Alert, Well developed/nourished, Other





- Neck


Neck: Supple w/out meningeal sx, Soft tissue TTP, Bony TTP.  No: Adenopathy





- Cardiac


Cardiac: Regular Rate, Radial strong equal, Pedal strong equal, Cap refill < 2 

sec





- Respiratory


Respiratory: Clear to ausultation sabrina.  No: Distress, Labored





- Abdomen


Abdomen: Normal Bowel sounds, Tender to palpation (   )





- Derm


Derm: Warm and dry.  No: Normal color (    ), Abrasion (s)





- Extremities


Extremities: Right shoulder (pain with passive ROM.  tenderness mid humerus.  no

 deformity), Left finger(s) (swelling ecchymosis and tenderness left middle 

finger, most pain proximal phalaynx; no deformity ), Right hip (mild tenderness 

proximal femur.  no pain elicited with passive internal or external rotation; no

 shortening or malrotation)





- Neuro


Neuro: Alert and Oriented X 3, CNII-XII intact





- GCS


Eye Opening: Spontaneous


Motor: Obeys Commands


Verbal: Oriented


Total: 15





Results





- Vitals


Vitals: 


                               Vital Signs - 24 hr











  10/08/21 10/08/21 10/08/21





  12:55 13:00 14:00


 


Temperature 36.5 C  


 


Heart Rate 63 64 64


 


Respiratory 16 16 16





Rate   


 


Blood Pressure 112/59 L 118/53 L 142/63 H


 


O2 Saturation 98 100 100








                                     Oxygen











O2 Source []                   Room air


 


O2 Source []                   Room air


 


O2 Source                      Room air

















- Labs


Labs: 


                                Laboratory Tests











  10/08/21 10/08/21





  13:18 13:18


 


WBC  5.9 


 


RBC  3.51 L 


 


Hgb  11.1 L 


 


Hct  34.1 L 


 


MCV  97.2 


 


MCH  31.6 H 


 


MCHC  32.6 


 


RDW  14.4 


 


Plt Count  197 


 


MPV  8.5 


 


Neut # (Auto)  3.5 


 


Lymph # (Auto)  1.5 


 


Mono # (Auto)  0.7 


 


Eos # (Auto)  0.2 


 


Baso # (Auto)  0.1 


 


Absolute Nucleated RBC  0.00 


 


Nucleated RBC %  0.0 


 


Sodium   142


 


Potassium   3.6


 


Chloride   104


 


Carbon Dioxide   28


 


Anion Gap   10.0


 


BUN   12


 


Creatinine   0.7


 


Estimated GFR (MDRD)   81 L


 


Glucose   97


 


Calcium   8.8


 


Total Bilirubin   1.1 H


 


AST   13


 


ALT   < 10 L


 


Alkaline Phosphatase   90


 


Total Protein   5.9 L


 


Albumin   3.6


 


Globulin   2.3


 


Albumin/Globulin Ratio   1.6


 


Lipase   30














- Rads (name of study)


  ** right shoulder


Radiology: Final report received (No fracture or dislocation)





  ** right hip


Radiology: Final report received (Prior right hip total arthroplasty with 

anatomic alignment.  No acute pelvic hip fracture.  No evidence of hardware 

complication.)





  ** Cervical spine CT


Radiology: Final report received (No evidence of fracture or traumatic 

malalignment.  Multilevel degenerative disc disease and arthropathy right upper 

lobe scarring and small nodule is similar to prior exam.)





  ** left hand


Radiology: EMP read indepedently (fx of left middle prox phalynx )





PD MEDICAL DECISION MAKING





- ED course


Complexity details: reviewed results, considered differential, d/w patient


ED course: 


75-year-old female presents the emergency department after ground-level fall at 

her care facility [facility].  She has a history of Parkinson's typically uses a

 walker for ambulation but was not using it today.





She initially reported right hip and shoulder pain.  History of previous ORIF to

 the right hip.  X-rays did not demonstrate any acute fractures and after time 

and observation in the ED she is moving both of these extremities normally.  CT 

cervical spine was also without acute fracture.





She did have noted swelling and ecchymosis to the left middle finger and of x-

ray does show a proximal phalanx fracture.  Given the patient's need to use a 

walker, she was splinted with an aluminum finger splint but was advised that she

 could remove it for showering and bend itas necessary to use the walker.





Patient was able to ambulate with ease here in the ER with a walker.  Advised 

close follow-up with her PCP as well as with orthopedics.





Attempted to contact pt's  and next of kin multiple times; no answer








Departure





- Departure


Disposition: 01 Home, Self Care


Clinical Impression: 


 Fall from ground level, History of Parkinson's disease, Right hip pain





Right shoulder pain


Qualifiers:


 Chronicity: acute Qualified Code(s): M25.511 - Pain in right shoulder





Fracture of phalanx of left middle finger


Qualifiers:


 Encounter type: initial encounter Fracture type: closed Phalanx: proximal 

Fracture alignment: nondisplaced Qualified Code(s): S62.643A - Nondisplaced 

fracture of proximal phalanx of left middle finger, initial encounter for closed

 fracture





Condition: Stable


Record reviewed to determine appropriate education?: Yes


Instructions:  ED Contusion Upper Extr Ch


Follow-Up: 


kM Flores MD [Primary Care Provider] - 


Yaniv Ferrlel MD [Provider Admit Priv/Credential] - 


Prescriptions: 


Acetaminophen [Tylenol] 650 mg PO Q8H PRN #30 tab


 PRN Reason: Pain


Comments: 


Mya petersen were seen in the emergency department today after a ground-level 

fall at [facility].  It is important that you use a walker 100% of the time 

when walking.  you were able to ambulate with a walker here in the ED





We did an x-ray of the hip and shoulder and do not see any broken bones.  We 

also did a CAT scan of your neck and do not see any fractures within your neck.





we did do an xray of your left hand.  You DO have a fracture of your proximal 

phalanx.  This has been placed in a temporary splint.  It can be removed for 

showering and then reapplied.  Ti will nto require surgery and should simply 

heal with immobilization only.  Please call the orthopedics department for 

follow up





You do have some mild hip and right shoulder pain.  This is most likely 

contusion or bruising from the fall.  You can take tylenol 650 mg 3x a day for 

pain. This prescription has been electronically to [facility] in [place].

## 2021-10-08 NOTE — XRAY REPORT
PROCEDURE:  Shoulder 2 View RT

 

INDICATIONS:  pain after fall

 

TECHNIQUE:  2 views of the shoulder were acquired.  

 

COMPARISON:  None.

 

FINDINGS:  

 

Bones:  No fractures or dislocations. Moderate acromioclavicular joint and glenohumeral joint osteoar
thritic changes are seen. No suspicious bony lesions.  Visualized ribs appear intact.  

 

Soft tissues:  No suspicious soft tissue calcifications.  

 

IMPRESSION:  No acute shoulder fracture or dislocation.

 

Reviewed by: Salbador Ybarra MD on 10/8/2021 1:34 PM PDT

Approved by: Salbador Ybarra MD on 10/8/2021 1:34 PM PDT

 

 

Station ID:  [id number]

## 2021-10-08 NOTE — XRAY REPORT
PROCEDURE:  Hand 3 View LT

 

INDICATIONS:  middle finger bruising after fall

 

TECHNIQUE:  3 views of the hand(s) acquired.  

 

COMPARISON:  7/19/2017

 

FINDINGS:  

 

Bones: There is acute fracture involving third proximal phalangeal base with slight lateral displacem
ent and overlapping of fracture site. Old healed fifth metacarpal neck fracture is seen with chronic 
appearing deformity. There is diffuse osteopenia. Moderate osteoarthritic changes are noted throughou
t left hand and wrist joints. No suspicious bony lesions.  

 

Soft tissues:  No suspicious soft tissue calcifications.  

 

IMPRESSION:  

Acute slightly displaced fracture involving third proximal phalangeal base. Old fifth metacarpal neck
 fracture. Diffuse osteopenia. Osteoarthritis throughout left hand and wrist.

 

Reviewed by: Salbador Ybarra MD on 10/8/2021 3:35 PM PDT

Approved by: Salbador Ybarra MD on 10/8/2021 3:35 PM PDT

 

 

Station ID:  [id number]

## 2021-10-08 NOTE — XRAY REPORT
PROCEDURE:  Hip w/Pelvis 2-3V RT

 

INDICATIONS:  right hip pain after fall

 

TECHNIQUE:  AP pelvis with lateral view(s) of the right hip(s).  

 

COMPARISON:  None.

 

FINDINGS:  

 

Bones:  Patient is status post prior right total hip arthroplasty. Right hip alignment is anatomic. C
hronic deformity involving greater tuberosity of right proximal femur is seen likely represent postsu
rgical changes. No gross hardware loosening or failure. No acute fractures or dislocations.  Pelvic r
ing appears intact.  No suspicious bony lesions.  

 

Soft tissues:  The visualized bowel gas pattern is normal.  No suspicious soft tissue calcifications.
  

 

IMPRESSION: Prior right total hip arthroplasty with anatomic alignment. No acute pelvic or hip fractu
re. No evidence of hardware competition.

 

Reviewed by: Salbador Ybarra MD on 10/8/2021 1:35 PM PDT

Approved by: Salbador Ybarra MD on 10/8/2021 1:35 PM PDT

 

 

Station ID:  [id number]

## 2021-10-08 NOTE — CT REPORT
PROCEDURE:  CT cervical spine without contrast

 

INDICATIONS:  GLF; neck pain

 

TECHNIQUE:  

Noncontrast 3 mm thick sections acquired from the skull base to the T4 level.  Sagittal and coronal r
eformats were then constructed.  For radiation dose reduction, the following was used:  automated exp
osure control, adjustment of mA and/or kV according to patient size.

 

COMPARISON:  None.

 

FINDINGS:  

Image quality:  Excellent.  

 

Bones: Vertebral body height is maintained. Normal prevertebral soft tissue and cranial vertebral rel
ationships. Normal bone mineralization. Grade 1 anterior spinal listhesis at C4-5 remains stable from
 the prior. Hypertrophic facet joints are more prominent on the left resulting in left foraminal sten
osis at C3-4 and C4-5.

 

Soft tissues:  Prevertebral soft tissues are normal in thickness.  No paravertebral hematomas.  No ap
ical pneumothoraces. Biapical pulmonary scarring and small pulmonary nodule is similar prior. Atheros
clerotic vascular calcification noted as well.

 

IMPRESSION:  

 

1. No evidence of fracture or traumatic malalignment.

2. Multilevel degenerative disc disease and arthropathy.

3. Right upper lobe scarring and small nodule is similar prior exam. Consider follow-up CT chest

 

Reviewed by: Guy Alexis MD on 10/8/2021 1:28 PM JUAN

Approved by: Guy Alexis MD on 10/8/2021 1:28 PM JUAN

 

 

Station ID:  [id number]

## 2021-11-16 ENCOUNTER — HOSPITAL ENCOUNTER (OUTPATIENT)
Dept: HOSPITAL 76 - EMS | Age: 76
End: 2021-11-16
Payer: MEDICARE

## 2021-11-16 DIAGNOSIS — Z03.89: Primary | ICD-10-CM

## 2022-02-02 ENCOUNTER — HOSPITAL ENCOUNTER (EMERGENCY)
Dept: HOSPITAL 76 - ED | Age: 77
Discharge: HOME | End: 2022-02-02
Payer: MEDICARE

## 2022-02-02 ENCOUNTER — HOSPITAL ENCOUNTER (OUTPATIENT)
Dept: HOSPITAL 76 - EMS | Age: 77
Discharge: ANOTHER HOSPITAL | End: 2022-02-02
Payer: MEDICARE

## 2022-02-02 VITALS — DIASTOLIC BLOOD PRESSURE: 68 MMHG | SYSTOLIC BLOOD PRESSURE: 110 MMHG

## 2022-02-02 DIAGNOSIS — I10: ICD-10-CM
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
## 2022-02-02 DIAGNOSIS — Z91.81: ICD-10-CM

## 2022-02-02 DIAGNOSIS — M19.042: ICD-10-CM

## 2022-02-02 DIAGNOSIS — S69.92XA: ICD-10-CM

## 2022-02-02 DIAGNOSIS — F02.80: ICD-10-CM

## 2022-02-02 DIAGNOSIS — M19.032: ICD-10-CM

## 2022-02-02 DIAGNOSIS — M19.041: ICD-10-CM

## 2022-02-02 DIAGNOSIS — Z96.641: ICD-10-CM
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
## 2022-02-02 DIAGNOSIS — S60.415A: Primary | ICD-10-CM

## 2022-02-02 DIAGNOSIS — Z79.82: ICD-10-CM

## 2022-02-02 DIAGNOSIS — G20: ICD-10-CM

## 2022-02-02 DIAGNOSIS — M19.031: ICD-10-CM

## 2022-02-02 DIAGNOSIS — M16.12: ICD-10-CM

## 2022-02-02 DIAGNOSIS — M25.552: Primary | ICD-10-CM

## 2022-02-02 PROCEDURE — 99281 EMR DPT VST MAYX REQ PHY/QHP: CPT

## 2022-02-02 PROCEDURE — 99284 EMERGENCY DEPT VISIT MOD MDM: CPT

## 2022-02-02 NOTE — XRAY REPORT
PROCEDURE:  Hip w/Pelvis 2-3V LT

 

INDICATIONS:  fall with left hip pain

 

TECHNIQUE:  AP pelvis with lateral view(s) of the left hip(s).  

 

COMPARISON:  None.

 

FINDINGS:  

 

Bones:  There is prior right total hip arthroplasty with anatomic right hip alignment. No acute left 
hip fractures or dislocations.  Mild to moderate left hip joint osteoarthritic changes are seen. No e
vidence of avascular necrosis of femoral head. Pelvic ring appears intact.  No suspicious bony lesion
s.  

 

Soft tissues:  The visualized bowel gas pattern is normal.  No suspicious soft tissue calcifications.
  

 

IMPRESSION: No gross acute left hip fracture or dislocation. Mild to moderate left hip joint osteoart
hritis. No evidence of avascular necrosis. Prior right total hip arthroplasty.

 

Reviewed by: Salbador Ybarra MD on 2/2/2022 7:44 PM PST

Approved by: Salbador Ybarra MD on 2/2/2022 7:44 PM PST

 

 

Station ID:  [id number]

## 2022-02-02 NOTE — CT REPORT
PROCEDURE:  HEAD WO

 

INDICATIONS:  Fall, dementia, chi

 

TECHNIQUE:  

Noncontrast 4.5 mm thick angled axial sections acquired from the foramen magnum to the vertex.  For r
adiation dose reduction, the following was used:  automated exposure control, adjustment of mA and/or
 kV according to patient size.

 

COMPARISON:  CT head 5/13/2021.

 

FINDINGS:  

Image quality:  Excellent.  

 

The ventricular system and cortical sulci demonstrate atrophy, consistent for patient's stated age.  
There are areas of hypodensity in the periventricular and subcortical white matter.  There is no acut
e intra or extra-axial fluid collection.  No acute hemorrhage, mass lesion or midline shift.  Brainst
em is unremarkable.  

Globes are symmetrical. Sinuses are aerated. Osseous structures are intact. 

 

 

IMPRESSION:  

 

1.  No acute intracranial process.

2.  Moderate atrophy and chronic microvascular ischemic changes.

 

Reviewed by: Hermelinda Alvarez MD on 2/2/2022 8:30 PM PST

Approved by: Hermelinda Alvarez MD on 2/2/2022 8:30 PM PST

 

 

Station ID:  [id number]

## 2022-02-02 NOTE — XRAY REPORT
PROCEDURE:  Hand 3 View BILAT

 

INDICATIONS:  fall onto hands

 

TECHNIQUE:  6 views of the hand(s) acquired.  

 

COMPARISON:  Left hand radiograph dated 10/8/2021

 

FINDINGS:  

 

Bones: There is diffuse osteopenia. Osteoarthritic changes are noted throughout bilateral hands and w
rists joints. Deformity involving fifth metacarpal neck is seen not significantly changed from previo
us study and suggestive of subacute to old fracture in this region. Healing fracture involving left t
hird proximal phalangeal base is again seen with stable alignment. No gross acute fracture or disloca
tion is seen. No suspicious bony lesions.  

 

Soft tissues:  No suspicious soft tissue calcifications.  

 

IMPRESSION:  

 

1. Subacute to chronic appearing deformities involving left fifth metacarpal neck and left third prox
imal phalangeal base unchanged from prior study. No gross acute fracture or dislocation.

2. Diffuse osteopenia. Moderate osteoarthritic changes throughout bilateral hands and wrists joints.

 

Reviewed by: Salbador Ybarra MD on 2/2/2022 7:47 PM PST

Approved by: Salbador Ybarra MD on 2/2/2022 7:47 PM PST

 

 

Station ID:  [id number]

## 2022-02-02 NOTE — ED PHYSICIAN DOCUMENTATION
History of Present Illness





- Stated complaint


Stated Complaint: GLF





- Chief complaint


Chief Complaint: Trauma Ext





- Additonal information


Additional information: 


Patient is a 76-year-old female coming from home with report of ground-level fal

l. Past medical significant for dementia, Parkinson's disease, hypertension, 

distant history of fracture of fifth metacarpal bone of left hand. EMS reports 

was mechanical fall. History limited by patient's dementia.








Review of Systems


Unable to obtain: Dementia





PD PAST MEDICAL HISTORY





- Past Medical History


Cardiovascular: Hypertension, High cholesterol


Respiratory: None


Neuro: Parkinson's


Endocrine/Autoimmune: None


GI: Ulcers


GYN: None


: None


HEENT: Chronic vision loss


Psych: None


Musculoskeletal: Osteoarthritis, Chronic back pain


Derm: None





- Past Surgical History


Past Surgical History: Yes


General: Colonoscopy


Ortho: Hip replacement, Rotator cuff repair





- Present Medications


Home Medications: 


                                Ambulatory Orders











 Medication  Instructions  Recorded  Confirmed


 


Carbidopa/Levodopa 25/100 [Sinemet 2 tab PO QDBREAKFAST 03/18/15 10/03/21





25 mg/100 mg]   


 


Citalopram [CeleXA] 10 mg PO QPM 03/18/15 10/03/21


 


Carbidopa/Levodopa 25/100 [Sinemet 5 tab PO 1330,1730,2100 04/23/19 10/03/21





25 mg/100 mg]   


 


Rivastigmine [Exelon 13.3MG] 1 tab PO DAILY 11/24/20 10/03/21


 


Aspirin [[place] Aspirin] 81 mg PO DAILY 09/22/21 10/03/21


 


Atorvastatin [Lipitor] 20 mg PO DAILY 09/22/21 10/03/21


 


Metoprolol Succinate [Toprol Xl] 25 mg PO DAILY 09/22/21 10/03/21


 


Quetiapine Fumarate [Seroquel] 25 mg PO DAILY 09/22/21 10/03/21


 


Acetaminophen [Tylenol] 650 mg PO Q8H PRN #30 tab 10/08/21 














- Allergies


Allergies/Adverse Reactions: 


                                    Allergies











Allergy/AdvReac Type Severity Reaction Status Date / Time


 


No Known Drug Allergies Allergy   Verified 02/02/22 18:53














- Social History


Does the pt smoke?: No


Smoking Status: Never smoker


Does the pt drink ETOH?: No


Does the pt have substance abuse?: Yes





- Immunizations


Immunizations are current?: Yes





- POLST


Patient has POLST: No





PD ED PE NORMAL





- Vitals


Vital signs reviewed: Yes





- HEENT


HEENT: Atraumatic, PERRL





- Neck


Neck: Supple, no meningeal sign, No bony TTP, No adenopathy





- Cardiac


Cardiac: RRR, No gallop





- Respiratory


Respiratory: No respiratory distress





- Abdomen


Abdomen: Normal bowel sounds, Soft, Non tender





- Extremities


Extremities: No deformity, No tenderness to palpate, Other (Superficial abrasion

 to left ring finger)





- Neuro


Neuro: CNs 2-12 intact, No motor deficit, No sensory deficit, Normal speech





Results





- Vitals


Vitals: 


                               Vital Signs - 24 hr











  02/02/22 02/02/22 02/02/22





  18:50 18:53 20:30


 


Temperature 37.1 C  37.1 C


 


Heart Rate 69 67 68


 


Respiratory 16  16





Rate   


 


Blood Pressure 131/62 H 131/62 H 110/68


 


O2 Saturation 98 98 100








                                     Oxygen











O2 Source [With Activity]      Room air


 


O2 Source [Without Activity]   Room air


 


O2 Source                      Room air

















PD MEDICAL DECISION MAKING





- ED course


Complexity details: reviewed results, d/w patient


ED course: 


Patient is 76-year-old female, known history of Parkinson's, dementia, frequent 

falls presenting to the emergency department after ground-level fall at home. 

Afebrile, hemodynamically stable on arrival to the emergency department. 

Nonfocal nonlateralizing neurologic exam. Patient is alert and orientated to 

person and intermittently to place. Given her history of dementia this is 

consistent with her baseline mentation. CT head nonacute. X-rays of hand, hip 

negative. Patient did have superficial abrasion to her left third phalanx, wound

care provided in the emergency department. Will discharge into the care of her 

. Encourage careful follow-up with primary care or return to the 

emergency department for new or worsening symptoms.








Departure





- Departure


Disposition: 01 Home, Self Care


Instructions:  ED Mechanical Fall, ED Prevention Fall


Comments: 


Thank you for allowing us to care for Jennifer today at [facility].





All of the imaging taken today including her x-rays and the CT scan of her head 

were very reassuring. I would like you to make a follow-up appointment with her 

primary care doctor for medical recheck in the next few days. She'll benefit 

from twice daily application of a topical antibiotic ointment such as bacitracin

or Neosporin to the small abrasion she has on her left hand. If it anytime she 

has any new or worsening symptoms or any further falls please not hesitate to 

return.


Discharge Date/Time: 02/02/22 20:45

## 2022-02-10 ENCOUNTER — HOSPITAL ENCOUNTER (OUTPATIENT)
Dept: HOSPITAL 76 - EMS | Age: 77
Discharge: ANOTHER HOSPITAL | End: 2022-02-10
Payer: MEDICARE

## 2022-02-10 ENCOUNTER — HOSPITAL ENCOUNTER (EMERGENCY)
Dept: HOSPITAL 76 - ED | Age: 77
Discharge: HOME | End: 2022-02-10
Payer: MEDICARE

## 2022-02-10 VITALS — DIASTOLIC BLOOD PRESSURE: 80 MMHG | SYSTOLIC BLOOD PRESSURE: 124 MMHG

## 2022-02-10 DIAGNOSIS — G20: ICD-10-CM

## 2022-02-10 DIAGNOSIS — R55: ICD-10-CM

## 2022-02-10 DIAGNOSIS — E86.0: Primary | ICD-10-CM

## 2022-02-10 DIAGNOSIS — D64.9: ICD-10-CM

## 2022-02-10 DIAGNOSIS — Z79.82: ICD-10-CM

## 2022-02-10 DIAGNOSIS — I95.1: ICD-10-CM

## 2022-02-10 DIAGNOSIS — R73.9: ICD-10-CM

## 2022-02-10 DIAGNOSIS — I10: ICD-10-CM

## 2022-02-10 DIAGNOSIS — R56.9: Primary | ICD-10-CM

## 2022-02-10 LAB
ALBUMIN DIAFP-MCNC: 3.2 G/DL (ref 3.2–5.5)
ALBUMIN/GLOB SERPL: 1.2 {RATIO} (ref 1–2.2)
ALP SERPL-CCNC: 81 IU/L (ref 42–121)
ALT SERPL W P-5'-P-CCNC: < 10 IU/L (ref 10–60)
ANION GAP SERPL CALCULATED.4IONS-SCNC: 7 MMOL/L (ref 6–13)
AST SERPL W P-5'-P-CCNC: 12 IU/L (ref 10–42)
BASOPHILS NFR BLD AUTO: 0.1 10^3/UL (ref 0–0.1)
BASOPHILS NFR BLD AUTO: 1.2 %
BILIRUB BLD-MCNC: 0.6 MG/DL (ref 0.2–1)
BUN SERPL-MCNC: 18 MG/DL (ref 6–20)
CALCIUM UR-MCNC: 8.2 MG/DL (ref 8.5–10.3)
CHLORIDE SERPL-SCNC: 103 MMOL/L (ref 101–111)
CLARITY UR REFRACT.AUTO: CLEAR
CO2 SERPL-SCNC: 28 MMOL/L (ref 21–32)
CREAT SERPLBLD-SCNC: 0.8 MG/DL (ref 0.4–1)
EOSINOPHIL # BLD AUTO: 0.3 10^3/UL (ref 0–0.7)
EOSINOPHIL NFR BLD AUTO: 4.5 %
ERYTHROCYTE [DISTWIDTH] IN BLOOD BY AUTOMATED COUNT: 14 % (ref 12–15)
GFRSERPLBLD MDRD-ARVRAT: 70 ML/MIN/{1.73_M2} (ref 89–?)
GLOBULIN SER-MCNC: 2.6 G/DL (ref 2.1–4.2)
GLUCOSE SERPL-MCNC: 129 MG/DL (ref 70–100)
GLUCOSE UR QL STRIP.AUTO: NEGATIVE MG/DL
HCT VFR BLD AUTO: 33.5 % (ref 37–47)
HGB UR QL STRIP: 10.9 G/DL (ref 12–16)
KETONES UR QL STRIP.AUTO: NEGATIVE MG/DL
LIPASE SERPL-CCNC: 31 U/L (ref 22–51)
LYMPHOCYTES # SPEC AUTO: 0.8 10^3/UL (ref 1.5–3.5)
LYMPHOCYTES NFR BLD AUTO: 14 %
MCH RBC QN AUTO: 30.9 PG (ref 27–31)
MCHC RBC AUTO-ENTMCNC: 32.5 G/DL (ref 32–36)
MCV RBC AUTO: 94.9 FL (ref 81–99)
MONOCYTES # BLD AUTO: 0.5 10^3/UL (ref 0–1)
MONOCYTES NFR BLD AUTO: 8.2 %
NEUTROPHILS # BLD AUTO: 4.1 10^3/UL (ref 1.5–6.6)
NEUTROPHILS # SNV AUTO: 5.7 X10^3/UL (ref 4.8–10.8)
NEUTROPHILS NFR BLD AUTO: 71.8 %
NITRITE UR QL STRIP.AUTO: NEGATIVE
NRBC # BLD AUTO: 0 /100WBC
NRBC # BLD AUTO: 0 X10^3/UL
PDW BLD AUTO: 8.6 FL (ref 7.9–10.8)
PH UR STRIP.AUTO: 7 PH (ref 5–7.5)
PLATELET # BLD: 209 10^3/UL (ref 130–450)
POTASSIUM SERPL-SCNC: 4.5 MMOL/L (ref 3.5–5)
PROT SPEC-MCNC: 5.8 G/DL (ref 6.7–8.2)
PROT UR STRIP.AUTO-MCNC: NEGATIVE MG/DL
RBC # UR STRIP.AUTO: NEGATIVE /UL
RBC # URNS HPF: (no result) /HPF (ref 0–5)
RBC MAR: 3.53 10^6/UL (ref 4.2–5.4)
SODIUM SERPLBLD-SCNC: 138 MMOL/L (ref 135–145)
SP GR UR STRIP.AUTO: 1.02 (ref 1–1.03)
SQUAMOUS URNS QL MICRO: (no result)
UROBILINOGEN UR QL STRIP.AUTO: (no result) E.U./DL
UROBILINOGEN UR STRIP.AUTO-MCNC: NEGATIVE MG/DL
WBC # UR MANUAL: (no result) /HPF (ref 0–5)

## 2022-02-10 PROCEDURE — 80053 COMPREHEN METABOLIC PANEL: CPT

## 2022-02-10 PROCEDURE — 93005 ELECTROCARDIOGRAM TRACING: CPT

## 2022-02-10 PROCEDURE — 36415 COLL VENOUS BLD VENIPUNCTURE: CPT

## 2022-02-10 PROCEDURE — 87086 URINE CULTURE/COLONY COUNT: CPT

## 2022-02-10 PROCEDURE — 81003 URINALYSIS AUTO W/O SCOPE: CPT

## 2022-02-10 PROCEDURE — 83690 ASSAY OF LIPASE: CPT

## 2022-02-10 PROCEDURE — 99283 EMERGENCY DEPT VISIT LOW MDM: CPT

## 2022-02-10 PROCEDURE — 81001 URINALYSIS AUTO W/SCOPE: CPT

## 2022-02-10 PROCEDURE — 99284 EMERGENCY DEPT VISIT MOD MDM: CPT

## 2022-02-10 PROCEDURE — 85025 COMPLETE CBC W/AUTO DIFF WBC: CPT

## 2022-02-10 NOTE — CT REPORT
PROCEDURE:  HEAD WO

 

INDICATIONS:  ALOC, ? sz

 

TECHNIQUE:  

Noncontrast 4.5 mm thick angled axial sections acquired from the foramen magnum to the vertex.  For r
adiation dose reduction, the following was used:  automated exposure control, adjustment of mA and/or
 kV according to patient size.

 

COMPARISON:  2/2/2022, 5/13/2021, 3/20/2021

 

FINDINGS:  

Image quality:  There is streak artifact seen through the skull base.   

 

CSF spaces:  Basal cisterns are patent.  No extra-axial fluid collections.  Ventricles are normal in 
size and shape.  

 

Brain:  No midline shift.  No intracranial masses or hemorrhage.  Gray-white matter interface is norm
al.  Age-appropriate brain parenchymal volume loss and chronic small vessel ischemic change can be se
en.  Pineal region calcification is seen, which is considered to be pathologic.

 

Skull and face:  Calvarium and visualized facial bones are intact, without suspicious lesions.  

 

Sinuses:  Visualized sinuses and mastoids are clear.  

 

 

 

 

IMPRESSION:  

 

No significant intracranial abnormality is seen.  

 

No intracranial hemorrhage is seen.   

 

Reviewed by: Steven Lepe MD on 2/10/2022 12:24 PM AK

Approved by: Steven Lepe MD on 2/10/2022 12:24 PM [facility]

 

 

Station ID:  [id number]

## 2022-03-31 ENCOUNTER — HOSPITAL ENCOUNTER (OUTPATIENT)
Dept: HOSPITAL 76 - DI.S | Age: 77
Discharge: HOME | End: 2022-03-31
Attending: NURSE PRACTITIONER
Payer: MEDICARE

## 2022-03-31 ENCOUNTER — HOSPITAL ENCOUNTER (EMERGENCY)
Dept: HOSPITAL 76 - ED | Age: 77
Discharge: HOME | End: 2022-03-31
Payer: MEDICARE

## 2022-03-31 ENCOUNTER — HOSPITAL ENCOUNTER (OUTPATIENT)
Dept: HOSPITAL 76 - EMS | Age: 77
Discharge: ANOTHER HOSPITAL | End: 2022-03-31
Payer: MEDICARE

## 2022-03-31 VITALS — SYSTOLIC BLOOD PRESSURE: 155 MMHG | DIASTOLIC BLOOD PRESSURE: 73 MMHG

## 2022-03-31 DIAGNOSIS — R93.6: ICD-10-CM

## 2022-03-31 DIAGNOSIS — R55: Primary | ICD-10-CM

## 2022-03-31 DIAGNOSIS — R41.82: Primary | ICD-10-CM

## 2022-03-31 DIAGNOSIS — M19.011: Primary | ICD-10-CM

## 2022-03-31 DIAGNOSIS — E16.2: ICD-10-CM

## 2022-03-31 LAB
ALBUMIN DIAFP-MCNC: 3.4 G/DL (ref 3.2–5.5)
ALBUMIN/GLOB SERPL: 1.7 {RATIO} (ref 1–2.2)
ALP SERPL-CCNC: 87 IU/L (ref 42–121)
ALT SERPL W P-5'-P-CCNC: < 10 IU/L (ref 10–60)
ANION GAP SERPL CALCULATED.4IONS-SCNC: 8 MMOL/L (ref 6–13)
AST SERPL W P-5'-P-CCNC: 15 IU/L (ref 10–42)
BASOPHILS NFR BLD AUTO: 0 10^3/UL (ref 0–0.1)
BASOPHILS NFR BLD AUTO: 0.7 %
BILIRUB BLD-MCNC: 0.7 MG/DL (ref 0.2–1)
BUN SERPL-MCNC: 17 MG/DL (ref 6–20)
CALCIUM UR-MCNC: 8 MG/DL (ref 8.5–10.3)
CHLORIDE SERPL-SCNC: 105 MMOL/L (ref 101–111)
CLARITY UR REFRACT.AUTO: (no result)
CO2 SERPL-SCNC: 25 MMOL/L (ref 21–32)
CREAT SERPLBLD-SCNC: 0.8 MG/DL (ref 0.4–1)
EOSINOPHIL # BLD AUTO: 0.1 10^3/UL (ref 0–0.7)
EOSINOPHIL NFR BLD AUTO: 2 %
ERYTHROCYTE [DISTWIDTH] IN BLOOD BY AUTOMATED COUNT: 15 % (ref 12–15)
GFRSERPLBLD MDRD-ARVRAT: 70 ML/MIN/{1.73_M2} (ref 89–?)
GLOBULIN SER-MCNC: 2 G/DL (ref 2.1–4.2)
GLUCOSE SERPL-MCNC: 113 MG/DL (ref 70–100)
GLUCOSE UR QL STRIP.AUTO: NEGATIVE MG/DL
HCT VFR BLD AUTO: 32.1 % (ref 37–47)
HGB UR QL STRIP: 10.3 G/DL (ref 12–16)
KETONES UR QL STRIP.AUTO: NEGATIVE MG/DL
LIPASE SERPL-CCNC: 45 U/L (ref 22–51)
LYMPHOCYTES # SPEC AUTO: 1.2 10^3/UL (ref 1.5–3.5)
LYMPHOCYTES NFR BLD AUTO: 21 %
MAGNESIUM SERPL-MCNC: 1.9 MG/DL (ref 1.7–2.8)
MCH RBC QN AUTO: 30.9 PG (ref 27–31)
MCHC RBC AUTO-ENTMCNC: 32.1 G/DL (ref 32–36)
MCV RBC AUTO: 96.4 FL (ref 81–99)
MONOCYTES # BLD AUTO: 0.5 10^3/UL (ref 0–1)
MONOCYTES NFR BLD AUTO: 9.4 %
NEUTROPHILS # BLD AUTO: 3.7 10^3/UL (ref 1.5–6.6)
NEUTROPHILS # SNV AUTO: 5.6 X10^3/UL (ref 4.8–10.8)
NEUTROPHILS NFR BLD AUTO: 66.5 %
NITRITE UR QL STRIP.AUTO: NEGATIVE
NRBC # BLD AUTO: 0 /100WBC
NRBC # BLD AUTO: 0 X10^3/UL
PDW BLD AUTO: 8.6 FL (ref 7.9–10.8)
PH UR STRIP.AUTO: 6 PH (ref 5–7.5)
PLATELET # BLD: 163 10^3/UL (ref 130–450)
POTASSIUM SERPL-SCNC: 4.1 MMOL/L (ref 3.5–5)
PROT SPEC-MCNC: 5.4 G/DL (ref 6.7–8.2)
PROT UR STRIP.AUTO-MCNC: NEGATIVE MG/DL
RBC # UR STRIP.AUTO: NEGATIVE /UL
RBC # URNS HPF: (no result) /HPF (ref 0–5)
RBC MAR: 3.33 10^6/UL (ref 4.2–5.4)
SODIUM SERPLBLD-SCNC: 138 MMOL/L (ref 135–145)
SP GR UR STRIP.AUTO: 1.02 (ref 1–1.03)
SQUAMOUS URNS QL MICRO: (no result)
UROBILINOGEN UR QL STRIP.AUTO: (no result) E.U./DL
UROBILINOGEN UR STRIP.AUTO-MCNC: NEGATIVE MG/DL

## 2022-03-31 PROCEDURE — 36415 COLL VENOUS BLD VENIPUNCTURE: CPT

## 2022-03-31 PROCEDURE — 84484 ASSAY OF TROPONIN QUANT: CPT

## 2022-03-31 PROCEDURE — 83605 ASSAY OF LACTIC ACID: CPT

## 2022-03-31 PROCEDURE — 99283 EMERGENCY DEPT VISIT LOW MDM: CPT

## 2022-03-31 PROCEDURE — 85025 COMPLETE CBC W/AUTO DIFF WBC: CPT

## 2022-03-31 PROCEDURE — 80053 COMPREHEN METABOLIC PANEL: CPT

## 2022-03-31 PROCEDURE — 93005 ELECTROCARDIOGRAM TRACING: CPT

## 2022-03-31 PROCEDURE — 81003 URINALYSIS AUTO W/O SCOPE: CPT

## 2022-03-31 PROCEDURE — 96360 HYDRATION IV INFUSION INIT: CPT

## 2022-03-31 PROCEDURE — 87086 URINE CULTURE/COLONY COUNT: CPT

## 2022-03-31 PROCEDURE — 83690 ASSAY OF LIPASE: CPT

## 2022-03-31 PROCEDURE — 83735 ASSAY OF MAGNESIUM: CPT

## 2022-03-31 PROCEDURE — 96361 HYDRATE IV INFUSION ADD-ON: CPT

## 2022-03-31 PROCEDURE — 81001 URINALYSIS AUTO W/SCOPE: CPT

## 2022-03-31 NOTE — XRAY REPORT
PROCEDURE:  Chest 1 View X-Ray

 

INDICATIONS:  chest pain

 

TECHNIQUE:  One view of the chest was acquired.  

 

COMPARISON:  None

 

FINDINGS:  

 

Surgical changes and devices:  None.  

 

Lungs and pleura:  No pleural effusions or pneumothorax.  Lungs are clear.  

 

Mediastinum:  Mediastinal contours appear normal.  Heart size is normal.  

 

Bones and chest wall:  No suspicious bony lesions.  Overlying soft tissues appear unremarkable.  

 

IMPRESSION:  

No acute cardiopulmonary pathology.

 

Reviewed by: Salbador Ybarra MD on 3/31/2022 12:18 PM PDT

Approved by: Salbador Ybarra MD on 3/31/2022 12:18 PM PDT

 

 

Station ID:  [id number]

## 2022-03-31 NOTE — EXTERNAL MEDICAL SUMMARY RPT
Continuity of Care Document

                            Created on:2022



Patient:FERNANDO THOMPSON

Sex:Female

:1945

External Reference #:[id number]





Demographics







                          Address                   [address] [place] [address] [place] [address]

 

                          Phone                     Unavailable

 

                          Preferred Language        Unknown

 

                          Marital Status            Unknown

 

                          Orthodoxy Affiliation     Unknown

 

                          Race                      Unknown

 

                          Ethnic Group              Unknown









Author







                          Organization              [facility]

 

                          [place] [address]

 

                          Phone                     [phone]









Care Team Providers







                    Name                Role                Phone

 

                    M.D.                Unavailable         Unavailable









Allergies

No information.



Encounters

No information.



Medications







                     date                description         facility

 

                     2022            metoprolol succinate  All

 

                     2022            atorvastatin        All

 

                     2022            quetiapine          All







Problems







                     date                description         facility

 

                     2022            Total score?        All

 

                     2022            Tobacco use and exposure  All

 

                     2022            Tobacco smoking status NHIS  All

 

                     2022            Repeated falls      All

 

                     2022            Recurrent falls     All

 

                     2022            Former smoker       All

 

                     2022            Details of drug misuse behavior  All

 

                     2022            Alcohol use         All

 

                     2022            Abnormality of gait  All

 

                     2022            Total score?        All

 

                     2022            Tobacco use and exposure  All

 

                     2022            Tobacco smoking status NHIS  All

 

                     2022            Former smoker       All

 

                     2022            Details of drug misuse behavior  All

 

                     2022            Alcohol use         All







Results







            test       status     date       ordered by  attending  specimen anthony

e

 

            urea_nitrogen_blood  unknown    2022   unknown    unknown    unk

nown

 

            Erythrocytes_volume_in  unknown    2022   unknown    unknown    

unknown



           _Blood_by_Automated_cou                                             



           nt                                                     

 

            Erythrocyte_distributi  unknown    2022   unknown    unknown    

unknown



           on_width_Ratio_by_Autom                                             



           ated_count                                             

 

            MCV_Entitic_volume_by_  unknown    [id number]   unknown    unknown    

unknown



           Automated_count                                             

 

            MCH_Entitic_mass_by_Au  unknown    24507847   unknown    unknown    

unknown



           tomated_count                                             

 

            Platelets_volume_in_Bl  unknown    03072191   unknown    unknown    

unknown



           ood_by_Automated_count                                             

 

            Platelet_mean_volume_E  unknown    00261996   unknown    unknown    

unknown



           ntitic_volume_in_Blood_                                             



           by_Eleanorker                                             

 

            neutrophil_count_blood  unknown    79937380   unknown    unknown    

unknown

 

            monocyte_count_blood  unknown    79926694   unknown    unknown    un

known

 

            lymphocyte_count_blood  unknown    62685506   unknown    unknown    

unknown

 

            Hemoglobin_Mass_volume  unknown    77835880   unknown    unknown    

unknown



           _in_Blood                                              

 

            eosinophil_count_blood  unknown    83199418   unknown    unknown    

unknown

 

            Basophils_volume_in_Bl  unknown    43511706   unknown    unknown    

unknown



           ood_by_Manual_count                                             

 

            leukocyte_count_blood  unknown    68939861   unknown    unknown    u

nknown

 

            erythrocyte_RBC_count  unknown    14152855   unknown    unknown    u

nknown

 

            Leukocytes_volume_in_B  unknown    07694662   unknown    unknown    

unknown



           lood_by_Automated_count                                             

 

            Glomerular_Filtration_  unknown    15106586   unknown    unknown    

unknown



           rate                                                   

 

            platelet_count  unknown    62851513   unknown    unknown    unknown

 

            hemoglobin_blood  unknown    47791902   unknown    unknown    unknow

n

 

            hematocrit_blood  unknown    74422526   unknown    unknown    unknow

n

 

            potassium_blood  unknown    27707019   unknown    unknown    unknown

 

            WBC_urine_on_microscop  unknown    28645704   unknown    unknown    

unknown



           y                                                      

 

            Urobilinogen_Presence_  unknown    33803738   unknown    unknown    

unknown



           in_Urine_by_Test_strip                                             

 

            Specific_gravity_of_Ur  unknown    26681167   unknown    unknown    

unknown



           ine_by_Test_strip                                             

 

            Nitrite_Presence_in_Ur  unknown    11321864   unknown    unknown    

unknown



           ine_by_Test_strip                                             

 

            Leukocyte_esterase_Pre  unknown    45454515   unknown    unknown    

unknown



           sence_in_Urine_by_Test_                                             



           strip                                                  

 

            Ketones_Mass_volume_in  unknown    40266499   unknown    unknown    

unknown



           _Urine_by_Test_strip                                             

 

            Color_of_Urine  unknown    02962158   unknown    unknown    unknown

 

            Bilirubin.total_Presen  unknown    01940768   unknown    unknown    

unknown



           ce_in_Urine_by_Test_str                                             



           ip                                                     

 

            clarity_urine_point  unknown    80753800   unknown    unknown    unk

nown

 

            pH_study_of_acidity  unknown    95360049   unknown    unknown    unk

nown

 

           Glomerular_filtration_r  unknown    43778371   unknown    unknown    

unknown



           ate_1.73_sq_M.predicted                                             



           _among_non-blacks_Volum                                             



           e_Rate_Area_in_Serum_Pl                                             



           asma_or_Blood_by_Creati                                             



           nine-based_formula_MDRD                                             



           _                                                      

 

            Hematocrit_Volume_Frac  unknown    69147854   unknown    unknown    

unknown



           tion_of_Blood_by_Automa                                             



           ted_count                                              

 

            bilirubin_serum_total  unknown    67724345   unknown    unknown    u

nknown

 

            alanine_aminotransfera  unknown    82802901   unknown    unknown    

unknown



           se_SGPT_serum                                             

 

            carbon_dioxide_serum_t  unknown    09834486   unknown    unknown    

unknown



           otal                                                   

 

            aspartate_aminotransfe  unknown    07516087   unknown    unknown    

unknown



           rase_SGOT_serum                                             

 

            protein_total_serum  unknown    52450894   unknown    unknown    unk

nown

 

            blood_glucose  unknown    30271962   unknown    unknown    unknown

 

            potassium_blood  unknown    56684271   unknown    unknown    unknown

 

            glucose_urine  unknown    45027754   unknown    unknown    unknown

 

            leukocyte_esterase_uri  unknown    46455096   unknown    unknown    

unknown



           ne_by_dipstick                                             

 

            urobilinogen_urine_sem  unknown    51079980   unknown    unknown    

unknown



           iquantitative_dipstick_                                             

 

            specific_gravity_urine  unknown    93615111   unknown    unknown    

unknown

 

            nitrite_urine_semiquan  unknown    98973865   unknown    unknown    

unknown



           titative                                               

 

            ketones_urine_by_test_  unknown    14364703   unknown    unknown    

unknown



           strip                                                  

 

            bilirubin_urine  unknown    22155040   unknown    unknown    unknown

 

            mean_corpuscular_volum  unknown    59391116   unknown    unknown    

unknown



           e_RBC                                                  

 

            Urea_nitrogen_Mass_vol  unknown    91491423   unknown    unknown    

unknown



           ume_in_Serum_or_Plasma                                             

 

            globulin_serum  unknown    36981181   unknown    unknown    unknown

 

            alkaline_phosphatase_s  unknown    27997559   unknown    unknown    

unknown



           vanessa                                                   

 

            Sodium_Moles_volume_in  unknown    45816950   unknown    unknown    

unknown



           _Serum_or_Plasma                                             

 

            Protein_Mass_volume_in  unknown    17940439   unknown    unknown    

unknown



           _Serum_or_Plasma                                             

 

            eosinophil_count_blood  unknown    31441446   unknown    unknown    

unknown

 

            anion_gap_serum  unknown    66077327   unknown    unknown    unknown

 

            mean_platelet_volume  unknown    40174893   unknown    unknown    un

known

 

            urine_color  unknown    10137709   unknown    unknown    unknown

 

            basophil_count_blood  unknown    11830912   unknown    unknown    un

known

 

            monocyte_count_blood  unknown    31908656   unknown    unknown    un

known

 

            lymphocyte_count_blood  unknown    11096382   unknown    unknown    

unknown

 

            neutrophil_count_blood  unknown    92084563   unknown    unknown    

unknown

 

            Glucose_Mass_volume_in  unknown    12855257   unknown    unknown    

unknown



           _Urine                                                 

 

            Glucose_Mass_volume_in  unknown    72076104   unknown    unknown    

unknown



           _Serum_or_Plasma                                             

 

            Globulin_Mass_volume_i  unknown    24769654   unknown    unknown    

unknown



           n_Serum                                                

 

            Creatinine_Mass_volume  unknown    53866771   unknown    unknown    

unknown



           _in_Serum_or_Plasma                                             

 

            Chloride_Moles_volume_  unknown    06048061   unknown    unknown    

unknown



           in_Serum_or_Plasma                                             

 

            carbon_dioxide_serum_t  unknown    22456332   unknown    unknown    

unknown



           otal                                                   

 

            Calcium_Moles_volume_i  unknown    62379428   unknown    unknown    

unknown



           n_Serum_or_Plasma                                             

 

            albumin_serum  unknown    05644244   unknown    unknown    unknown

 

            Bilirubin.total_Mass_v  unknown    03580158   unknown    unknown    

unknown



           olume_in_Serum_or_Plasm                                             



           a                                                      

 

            Aspartate_aminotransfe  unknown    38817649   unknown    unknown    

unknown



           rase_Enzymatic_activity                                             



           _volume_in_Serum_or_Pla                                             



           sma                                                    

 

            Anion_gap_4_in_Serum_o  unknown    82857977   unknown    unknown    

unknown



           r_Plasma                                               

 

            creatinine_serum  unknown    07995555   unknown    unknown    unknow

n

 

            Alkaline_phosphatase_E  unknown    44100977   unknown    unknown    

unknown



           nzymatic_activity_volum                                             



           e_in_Blood                                             

 

            Albumin_Globulin_Mass_  unknown    70138752   unknown    unknown    

unknown



           Ratio_in_Serum_or_Plasm                                             



           a                                                      

 

            Albumin_Mass_volume_in  unknown    03116124   unknown    unknown    

unknown



           _Serum_or_Plasma                                             

 

            Alanine_aminotransfera  unknown    72861833   unknown    unknown    

unknown



           se_Enzymatic_activity_v                                             



           olume_in_Serum_or_Plasm                                             



           a                                                      

 

            mean_corpuscular_hemog  unknown    18543545   unknown    unknown    

unknown



           lobin_concentration_rbc                                             

 

            sodium_serum  unknown    43231102   unknown    unknown    unknown

 

            albumin_globulin_ratio  unknown    94834138   unknown    unknown    

unknown



           _serum                                                 

 

            chloride_serum  unknown    00550953   unknown    unknown    unknown

 

            calcium_serum  unknown    13381007   unknown    unknown    unknown

 

            mean_corpuscular_hemog  unknown    47234348   unknown    unknown    

unknown



           lobin_RBC                                              

 

            red_blood_cell_distrib  unknown    25797670   unknown    unknown    

unknown



           ution_width                                             

 

            WBC_urine_on_microscop  unknown    05933858   unknown    unknown    

unknown



           y                                                      

 

            T          unknown    33139194   unknown    unknown    unknown

 

            WBC_URINE  unknown    08519776   unknown    unknown    unknown

 

            UROBILINOGEN_URINE  unknown    17662645   unknown    unknown    unkn

own

 

            SPECIFIC_GRAVITY_URINE  unknown    83084241   unknown    unknown    

unknown

 

            T          unknown    90956428   unknown    unknown    unknown

 

            T          unknown    79290009   unknown    unknown    unknown

 

            T          unknown    36420023   unknown    unknown    unknown

 

            T          unknown    43534692   unknown    unknown    unknown

 

            T          unknown    62482305   unknown    unknown    unknown

 

            T          unknown    43620674   unknown    unknown    unknown

 

            T          unknown    18373654   unknown    unknown    unknown

 

            T          unknown    97157892   unknown    unknown    unknown

 

            T          unknown    46452097   unknown    unknown    unknown

 

            T          unknown    46368971   unknown    unknown    unknown

 

            T          unknown    75845405   unknown    unknown    unknown

 

            PH_URINE   unknown    41746389   unknown    unknown    unknown

 

            NITRITE_URINE  unknown    49073613   unknown    unknown    unknown

 

            LEUKOCYTE_ESTERASE_URI  unknown    28209693   unknown    unknown    

unknown



           NE                                                     

 

            KETONES_URINE_UA_  unknown    99601901   unknown    unknown    unkno

wn

 

            GLUCOSE_URINE_UA_  unknown    23039493   unknown    unknown    unkno

wn

 

            COLOR_URINE  unknown    66259059   unknown    unknown    unknown

 

            CLARITY_URINE  unknown    30903987   unknown    unknown    unknown

 

            BILIRUBIN_URINE  unknown    83893036   unknown    unknown    unknown

 

            T          unknown    81658607   unknown    unknown    unknown

 

            T          unknown    55132700   unknown    unknown    unknown

 

            T          unknown    91795283   unknown    unknown    unknown

 

            T          unknown    44826570   unknown    unknown    unknown

 

            T          unknown    31460464   unknown    unknown    unknown

 

            NEUTROPHILS_AUTO_  unknown    50193610   unknown    unknown    unkno

wn

 

            T          unknown    72018708   unknown    unknown    unknown

 

            T          unknown    19271369   unknown    unknown    unknown

 

            T          unknown    57091015   unknown    unknown    unknown

 

            MONOCYTES_AUTO_  unknown    80763716   unknown    unknown    unknown

 

            T          unknown    10614726   unknown    unknown    unknown

 

            T          unknown    14171284   unknown    unknown    unknown

 

            T          unknown    56285026   unknown    unknown    unknown

 

            T          unknown    99950488   unknown    unknown    unknown

 

            LYMPHOCYTES_AUTO_  unknown    32232591   unknown    unknown    unkno

wn

 

            T          unknown    27618832   unknown    unknown    unknown

 

            T          unknown    79544510   unknown    unknown    unknown

 

            T          unknown    96164596   unknown    unknown    unknown

 

            T          unknown    83494237   unknown    unknown    unknown

 

            T          unknown    41049348   unknown    unknown    unknown

 

            T          unknown    91800881   unknown    unknown    unknown

 

            T          unknown    57062994   unknown    unknown    unknown

 

            GFR_-_MDRD  unknown    76047957   unknown    unknown    unknown

 

            T          unknown    87588802   unknown    unknown    unknown

 

            EOSINOPHILS_AUTO_  unknown    17450239   unknown    unknown    unkno

wn

 

            T          unknown    93905910   unknown    unknown    unknown

 

            T          unknown    48446004   unknown    unknown    unknown

 

            T          unknown    51022078   unknown    unknown    unknown

 

            T          unknown    02085074   unknown    unknown    unknown

 

            T          unknown    56807270   unknown    unknown    unknown

 

            T          unknown    08358942   unknown    unknown    unknown

 

            BILIRUBIN_TOTAL  unknown    53961282   unknown    unknown    unknown

 

            BASOPHILS_AUTO_  unknown    24362738   unknown    unknown    unknown

 

            T          unknown    21263879   unknown    unknown    unknown

 

            T          unknown    53130405   unknown    unknown    unknown

 

            T          unknown    57348571   unknown    unknown    unknown

 

            T          unknown    40432566   unknown    unknown    unknown

 

            ALT_ALANINE_AMINOTRANS  unknown    02628447   unknown    unknown    

unknown



           FERASE                                                 

 

            ALKALINE_PHOSPHATASE  unknown    29892210   unknown    unknown    un

known

 

            T          unknown    17075503   unknown    unknown    unknown

 

            T          unknown    38513887   unknown    unknown    unknown

 

            ALBUMIN_GLOBULIN_RATIO  unknown    04590505   unknown    unknown    

unknown









         facility  observation  status  value   reference  units   lab code  abn

ormal  

line



                                        range                           notes

 

         All     urea_nitroge  unknown  18      unknown  mg/dL   _9      unknown

  unknown



                n_blood                                                 

 

         All     Erythrocytes  unknown  3.53 10  unknown          _789-8  unknow

n  unknown



                _volume_in_Bl         6/UL                                    



                ood_by_Automa                                                 



                ted_count                                                 

 

         All     Erythrocyte_  unknown  14.0    unknown  %       _788-0  unknown

  unknown



                distribution_                                                 



                width_Ratio_b                                                 



                y_Automated_c                                                 



                ount                                                    

 

         All     MCV_Entitic_  unknown  94.9    unknown  fL      _787-2  unknown

  unknown



                volume_by_Aut                                                 



                omated_count                                                 

 

         All     MCH_Entitic_  unknown  30.9    unknown  pg      _785-6  unknown

  unknown



                mass_by_Autom                                                 



                ated_count                                                 

 

         All     Platelets_vo  unknown  209 10  unknown          _777-3  unknown

  unknown



                lume_in_Blood         3/UL                                    



                _by_Automated                                                 



                _count                                                  

 

         All     Platelet_mea  unknown  8.6     unknown  fL      _776-5  unknown

  unknown



                n_volume_Enti                                                 



                tic_volume_in                                                 



                _Blood_by_Ree                                                 



                s-Sharon                                                 

 

         All     neutrophil_c  unknown  4.1 10  unknown          _752-6  unknown

  unknown



                ount_blood         3/UL                                    

 

         All     monocyte_cou  unknown  0.5 10  unknown          _743-5  unknown

  unknown



                nt_blood         3/UL                                    

 

         All     lymphocyte_c  unknown  0.8 10  unknown          _732-8  unknown

  unknown



                ount_blood         3/UL                                    

 

         All     Hemoglobin_M  unknown  10.9    unknown  g/dL    _718-7  unknown

  unknown



                ass_volume_in                                                 



                _Blood                                                  

 

         All     eosinophil_c  unknown  0.3 10  unknown          _712-0  unknown

  unknown



                ount_blood         3/UL                                    

 

         All     Basophils_vo  unknown  0.1 10  unknown          _705-4  unknown

  unknown



                lume_in_Blood         3/UL                                    



                _by_Manual_co                                                 



                unt                                                     

 

         All     leukocyte_co  unknown  5.7 X10  unknown          _68     unknow

n  unknown



                unt_blood         3/UL                                    

 

         All     erythrocyte_  unknown  3.53 10  unknown          _67     unknow

n  unknown



                RBC_count         6/UL                                    

 

         All     Leukocytes_v  unknown  5.7 X10  unknown          _6690-2  unkno

wn  unknown



                olume_in_Bloo         3/UL                                    



                d_by_Automate                                                 



                d_count                                                 

 

         All     Glomerular_F  unknown  70      unknown  mL/mi   _66455  unknown

  unknown



                iltration_rat                         n                       



                e                                                       

 

         All     platelet_cou  unknown  209 10  unknown          _66     unknown

  unknown



                nt              3/UL                                    

 

         All     hemoglobin_b  unknown  10.9    unknown  g/dL    _65     unknown

  unknown



                lood                                                    

 

         All     hematocrit_b  unknown  33.5    unknown  %       _64     unknown

  unknown



                lood                                                    

 

         All     potassium_bl  unknown  4.5     unknown  meq/L   _6298-4  unknow

n  unknown



                ood                                                     

 

         All     WBC_urine_on  unknown  6-10 /HPF  unknown          _5821-4  unk

nown  unknown



                _microscopy                                                 

 

         All     Urobilinogen  unknown  1       unknown          _5818-0  unknow

n  unknown



                _Presence_in_         (NORMAL)                                 



                Urine_by_Test                                                 



                _strip                                                  

 

         All     Specific_gra  unknown  1.020   unknown          _5811-5  unknow

n  unknown



                vity_of_Urine                                                 



                _by_Test_stri                                                 



                p                                                       

 

         All     Nitrite_Pres  unknown  NEGATIVE  unknown          _5802-4  unkn

own  unknown



                ence_in_Urine                                                 



                _by_Test_stri                                                 



                p                                                       

 

         All     Leukocyte_es  unknown  SMALL   unknown          _5799-2  unknow

n  unknown



                terase_Presen                                                 



                ce_in_Urine_b                                                 



                y_Test_strip                                                 

 

         All     Ketones_Mass  unknown  NEGATIVE  unknown          _5797-6  unkn

own  unknown



                _volume_in_Ur                                                 



                ine_by_Test_s                                                 



                trip                                                    

 

         All     Color_of_Uri  unknown  YELLOW  unknown          _5778-6  unknow

n  unknown



                ne                                                      

 

         All     Bilirubin.to  unknown  NEGATIVE  unknown          _5770-3  unkn

own  unknown



                tal_Presence_                                                 



                in_Urine_by_T                                                 



                est_strip                                                 

 

         All     clarity_urin  unknown  CLEAR   unknown          _5589   unknown

  unknown



                e_point                                                 

 

         All     pH_study_of_  unknown  7.0     unknown          _51641  unknown

  unknown



                acidity                                                 

 

         All    Glomerular_fi  unknown  70      unknown  mL/mi   _48642-3  unkno

wn  unknown



                ltration_rate                         n                       



                _1.73_sq_M.pr                                                 



                edicted_among                                                 



                _non-blacks_V                                                 



                olume_Rate_Ar                                                 



                ea_in_Serum_P                                                 



                lasma_or_Bloo                                                 



                d_by_Creatini                                                 



                ne-based_form                                                 



                ula_MDRD_                                                 

 

         All     Hematocrit_V  unknown  33.5    unknown  %       _4544-3  unknow

n  unknown



                olume_Fractio                                                 



                n_of_Blood_by                                                 



                _Automated_co                                                 



                unt                                                     

 

         All     bilirubin_se  unknown  0.6     unknown  mg/dL   _43     unknown

  unknown



                rum_total                                                 

 

         All     alanine_amin  unknown  < 10 IU/L  unknown          _40     unkn

own  unknown



                otransferase_                                                 



                SGPT_serum                                                 

 

         All     carbon_dioxi  unknown  28      unknown  mmol/   _3962   unknown

  unknown



                de_serum_tota                         L                       



                l                                                       

 

         All     aspartate_am  unknown  12      unknown  U/L     _39     unknown

  unknown



                inotransferas                                                 



                e_SGOT_serum                                                 

 

         All     protein_tota  unknown  5.8     unknown  g/dL    _36     unknown

  unknown



                l_serum                                                 

 

         All     blood_glucos  unknown  129     unknown  mg/dL   _3565   unknown

  unknown



                e                                                       

 

         All     potassium_bl  unknown  4.5     unknown  meq/L   _3483   unknown

  unknown



                ood                                                     

 

         All     glucose_urin  unknown  NEGATIVE  unknown          _3369   unkno

wn  unknown



                e               mg/dL                                   

 

         All     leukocyte_es  unknown  SMALL   unknown          _327    unknown

  unknown



                terase_urine_                                                 



                by_dipstick                                                 

 

         All     urobilinogen  unknown  1       unknown          _326    unknown

  unknown



                _urine_semiqu         (NORMAL)                                 



                antitative_di                                                 



                pstick_                                                 

 

         All     specific_gra  unknown  1.020   unknown          _325    unknown

  unknown



                vity_urine                                                 

 

         All     nitrite_urin  unknown  NEGATIVE  unknown          _323    unkno

wn  unknown



                e_semiquantit                                                 



                ative                                                   

 

         All     ketones_urin  unknown  NEGATIVE  unknown          _322    unkno

wn  unknown



                e_by_test_str                                                 



                ip                                                      

 

         All     bilirubin_ur  unknown  NEGATIVE  unknown          _319    unkno

wn  unknown



                ine                                                     

 

         All     mean_corpusc  unknown  94.9    unknown  fL      _315    unknown

  unknown



                ular_volume_R                                                 



                BC                                                      

 

         All     Urea_nitroge  unknown  18      unknown  mg/dL   _3094-0  unknow

n  unknown



                n_Mass_volume                                                 



                _in_Serum_or_                                                 



                Plasma                                                  

 

         All     globulin_ser  unknown  2.6     unknown          _3059   unknown

  unknown



                um                                                      

 

         All     alkaline_pho  unknown  81      unknown  U/L     _3      unknown

  unknown



                sphatase_seru                                                 



                m                                                       

 

         All     Sodium_Moles  unknown  138     unknown  mmol/   _2951-2  unknow

n  unknown



                _volume_in_Se                         L                       



                rum_or_Plasma                                                 

 

         All     Protein_Mass  unknown  5.8     unknown  g/dL    _2885-2  unknow

n  unknown



                _volume_in_Se                                                 



                rum_or_Plasma                                                 

 

         All     eosinophil_c  unknown  0.3 10  unknown          _285    unknown

  unknown



                ount_blood         3/UL                                    

 

         All     anion_gap_se  unknown  7.0     unknown          _279    unknown

  unknown



                rum                                                     

 

         All     mean_platele  unknown  8.6     unknown  fL      _2784   unknown

  unknown



                t_volume                                                 

 

         All     urine_color  unknown  YELLOW  unknown          _2751   unknown 

 unknown

 

         All     basophil_cou  unknown  0.1 10  unknown          _2427   unknown

  unknown



                nt_blood         3/UL                                    

 

         All     monocyte_cou  unknown  0.5 10  unknown          _2422   unknown

  unknown



                nt_blood         3/UL                                    

 

         All     lymphocyte_c  unknown  0.8 10  unknown          _2420   unknown

  unknown



                ount_blood         3/UL                                    

 

         All     neutrophil_c  unknown  4.1 10  unknown          _2418   unknown

  unknown



                ount_blood         3/UL                                    

 

         All     Glucose_Mass  unknown  NEGATIVE  unknown          _2350-7  unkn

own  unknown



                _volume_in_Ur         mg/dL                                   



                ine                                                     

 

         All     Glucose_Mass  unknown  129     unknown  mg/dL   _2345-7  unknow

n  unknown



                _volume_in_Se                                                 



                rum_or_Plasma                                                 

 

         All     Globulin_Mas  unknown  2.6     unknown          _2336-6  unknow

n  unknown



                s_volume_in_S                                                 



                vanessa                                                    

 

         All     Creatinine_M  unknown  0.8     unknown  mg/dL   _2160-0  unknow

n  unknown



                ass_volume_in                                                 



                _Serum_or_Pla                                                 



                sma                                                     

 

         All     Chloride_Mol  unknown  103     unknown  mmol/   _-0  unknow

n  unknown



                es_volume_in_                         L                       



                Serum_or_Plas                                                 



                ma                                                      

 

         All     carbon_dioxi  unknown  28      unknown  mmol/   _-  unknow

n  unknown



                de_serum_tota                         L                       



                l                                                       

 

         All     Calcium_Mole  unknown  8.2     unknown  mg/dL   _-  unknow

n  unknown



                s_volume_in_S                                                 



                erum_or_Plasm                                                 



                a                                                       

 

         All     albumin_seru  unknown  3.2     unknown  g/dL    _2      unknown

  unknown



                m                                                       

 

         All     Bilirubin.to  unknown  0.6     unknown  mg/dL   _-  unknow

n  unknown



                tal_Mass_volu                                                 



                me_in_Serum_o                                                 



                r_Plasma                                                 

 

         All     Aspartate_am  unknown  12      unknown  U/L     _-8  unknow

n  unknown



                inotransferas                                                 



                e_Enzymatic_a                                                 



                ctivity_volum                                                 



                e_in_Serum_or                                                 



                _Plasma                                                 

 

         All     Anion_gap_4_  unknown  7.0     unknown          _1863-0  unknow

n  unknown



                in_Serum_or_P                                                 



                lasma                                                   

 

         All     creatinine_s  unknown  0.8     unknown  mg/dL   _18     unknown

  unknown



                vanessa                                                    

 

         All     Alkaline_pho  unknown  81      unknown  U/L     _1783-0  unknow

n  unknown



                sphatase_Enzy                                                 



                matic_activit                                                 



                y_volume_in_B                                                 



                lood                                                    

 

         All     Albumin_Glob  unknown  1.2     unknown          _1759-0  unknow

n  unknown



                ulin_Mass_Rat                                                 



                io_in_Serum_o                                                 



                r_Plasma                                                 

 

         All     Albumin_Mass  unknown  3.2     unknown  g/dL    _1751-7  unknow

n  unknown



                _volume_in_Se                                                 



                rum_or_Plasma                                                 

 

         All     Alanine_amin  unknown  < 10 IU/L  unknown          _1742-6  unk

nown  unknown



                otransferase_                                                 



                Enzymatic_act                                                 



                ivity_volume_                                                 



                in_Serum_or_P                                                 



                lasma                                                   

 

         All     mean_corpusc  unknown  32.5    unknown  g/dL    _17029  unknown

  unknown



                ular_hemoglob                                                 



                in_concentrat                                                 



                ion_rbc                                                 

 

         All     sodium_serum  unknown  138     unknown  mmol/   _159    unknown

  unknown



                                                L                       

 

         All     albumin_glob  unknown  1.2     unknown          _146    unknown

  unknown



                ulin_ratio_se                                                 



                rum                                                     

 

         All     chloride_ser  unknown  103     unknown  mmol/   _13     unknown

  unknown



                um                              L                       

 

         All     calcium_seru  unknown  8.2     unknown  mg/dL   _11     unknown

  unknown



                m                                                       

 

         All     mean_corpusc  unknown  30.9    unknown  pg      _1031   unknown

  unknown



                ular_hemoglob                                                 



                in_RBC                                                  

 

         All     red_blood_ce  unknown  14.0    unknown  %       _1030   unknown

  unknown



                ll_distributi                                                 



                on_width                                                 

 

         All     WBC_urine_on  unknown  6-10 /HPF  unknown          _1016   unkn

own  unknown



                _microscopy                                                 

 

         All     T       unknown  5.7 X10  unknown          WBC     unknown  unk

nown



                                3/UL                                    

 

         All     WBC_URINE  unknown  6-10 /HPF  unknown          UWBC    unknown

  unknown

 

         All     UROBILINOGEN  unknown  1       unknown          UUROBIL  unknow

n  unknown



                _URINE          (NORMAL)                                 

 

         All     SPECIFIC_GRA  unknown  1.020   unknown          USG     unknown

  unknown



                VITY_URINE                                                 

 

         All     T       unknown  6-10 /HPF  unknown          UR_WBC  unknown  u

nknown

 

         All     T       unknown  1       unknown          UR_URO  unknown  unkn

own



                                (NORMAL)                                 

 

         All     T       unknown  1.020   unknown          UR_SG   unknown  unkn

own

 

         All     T       unknown  7.0     unknown          UR_PH   unknown  unkn

own

 

         All     T       unknown  NEGATIVE  unknown          UR_NIT  unknown  un

known

 

         All     T       unknown  SMALL   unknown          UR_LEU_E  unknown  un

known



                                                        STERASE         

 

         All     T       unknown  NEGATIVE  unknown          UR_KETO_  unknown  

unknown



                                                        UA_             

 

         All     T       unknown  NEGATIVE  unknown          UR_GLU  unknown  un

known



                                mg/dL                                   

 

         All     T       unknown  YELLOW  unknown          UR_COLOR  unknown  un

known

 

         All     T       unknown  CLEAR   unknown          UR_CLARI  unknown  un

known



                                                        TY              

 

         All     T       unknown  NEGATIVE  unknown          UR_BILI  unknown  u

nknown

 

         All     PH_URINE  unknown  7.0     unknown          UPH     unknown  un

known

 

         All     NITRITE_URIN  unknown  NEGATIVE  unknown          UNITRITE  unk

nown  unknown



                E                                                       

 

         All     LEUKOCYTE_ES  unknown  SMALL   unknown          ULEUK   unknown

  unknown



                TERASE_URINE                                                 

 

         All     KETONES_URIN  unknown  NEGATIVE  unknown          UKET    unkno

wn  unknown



                E_UA_                                                   

 

         All     GLUCOSE_URIN  unknown  NEGATIVE  unknown          UGLUC   unkno

wn  unknown



                E_UA_           mg/dL                                   

 

         All     COLOR_URINE  unknown  YELLOW  unknown          UCOL    unknown 

 unknown

 

         All     CLARITY_URIN  unknown  CLEAR   unknown          UCLAR   unknown

  unknown



                E                                                       

 

         All     BILIRUBIN_UR  unknown  NEGATIVE  unknown          UBIL    unkno

wn  unknown



                INE                                                     

 

         All     T       unknown  0.6     unknown  mg/dL   TOTAL_BI  unknown  un

known



                                                        LI              

 

         All     T       unknown  14.0    unknown  %       RDW     unknown  unkn

own

 

         All     T       unknown  3.53 10  unknown          RBC     unknown  unk

nown



                                6/UL                                    

 

         All     T       unknown  5.8     unknown  g/dL    PRO_TOTA  unknown  un

known



                                                        L               

 

         All     T       unknown  209 10  unknown          PLT     unknown  unkn

own



                                3/UL                                    

 

         All     NEUTROPHILS_  unknown  4.1 10  unknown          NE_     unknown

  unknown



                AUTO_           3/UL                                    

 

         All     T       unknown  4.1 10  unknown          NEUT_AUT  unknown  un

known



                                3/UL                    O_              

 

         All     T       unknown  138     unknown  mmol/   NA      unknown  unkn

own



                                                L                       

 

         All     T       unknown  8.6     unknown  fL      MPV     unknown  unkn

own

 

         All     MONOCYTES_AU  unknown  0.5 10  unknown          MO_     unknown

  unknown



                TO_             3/UL                                    

 

         All     T       unknown  0.5 10  unknown          MONO_AUT  unknown  un

known



                                3/UL                    O_              

 

         All     T       unknown  94.9    unknown  fL      MCV     unknown  unkn

own

 

         All     T       unknown  32.5    unknown  g/dL    MCHC    unknown  unkn

own

 

         All     T       unknown  30.9    unknown  pg      MCH     unknown  unkn

own

 

         All     LYMPHOCYTES_  unknown  0.8 10  unknown          LY_     unknown

  unknown



                AUTO_           3/UL                                    

 

         All     T       unknown  0.8 10  unknown          LYMPH_AU  unknown  un

known



                                3/UL                    TO_             

 

         All     T       unknown  4.5     unknown  meq/L   K       unknown  unkn

own

 

         All     T       unknown  10.9    unknown  g/dL    HGB     unknown  unkn

own

 

         All     T       unknown  33.5    unknown  %       HCT     unknown  unkn

own

 

         All     T       unknown  129     unknown  mg/dL   GLU     unknown  unkn

own

 

         All     T       unknown  2.6     unknown          GLOB    unknown  unkn

own

 

         All     T       unknown  70      unknown  mL/mi   GFR_-_MD  unknown  un

known



                                                n       RD              

 

         All     GFR_-_MDRD  unknown  70      unknown  mL/mi   GFR     unknown  

unknown



                                                n                       

 

         All     T       unknown  7.0     unknown          GAP     unknown  unkn

own

 

         All     EOSINOPHILS_  unknown  0.3 10  unknown          EO_     unknown

  unknown



                AUTO_           3/UL                                    

 

         All     T       unknown  0.3 10  unknown          EOS_AUTO  unknown  un

known



                                3/UL                    _               

 

         All     T       unknown  0.8     unknown  mg/dL   CREAT   unknown  unkn

own

 

         All     T       unknown  28      unknown  mmol/   CO2     unknown  unkn

own



                                                L                       

 

         All     T       unknown  103     unknown  mmol/   CL      unknown  unkn

own



                                                L                       

 

         All     T       unknown  8.2     unknown  mg/dL   CA      unknown  unkn

own

 

         All     T       unknown  18      unknown  mg/dL   BUN     unknown  unkn

own

 

         All     BILIRUBIN_TO  unknown  0.6     unknown  mg/dL   BILIT   unknown

  unknown



                CONNIE                                                     

 

         All     BASOPHILS_AU  unknown  0.1 10  unknown          BA_     unknown

  unknown



                TO_             3/UL                                    

 

         All     T       unknown  0.1 10  unknown          BASO_AUT  unknown  un

known



                                3/UL                    O_              

 

         All     T       unknown  1.2     unknown          A_G_RATI  unknown  un

known



                                                        O               

 

         All     T       unknown  12      unknown  U/L     AST     unknown  unkn

own

 

         All     T       unknown  < 10 IU/L  unknown          ALT_SGPT  unknown 

 unknown



                                                        _               

 

         All     ALT_ALANINE_  unknown  < 10 IU/L  unknown          ALT     unkn

own  unknown



                AMINOTRANSFER                                                 



                ASE                                                     

 

         All     ALKALINE_PHO  unknown  81      unknown  U/L     ALP     unknown

  unknown



                SPHATASE                                                 

 

         All     T       unknown  81      unknown  U/L     ALK_PHOS  unknown  un

known

 

         All     T       unknown  3.2     unknown  g/dL    ALB     unknown  unkn

own

 

         All     ALBUMIN_GLOB  unknown  1.2     unknown          AGRATIO  unknow

n  unknown



                ULIN_RATIO                                                 







Vital Signs







                 date            measurement     value           source

 

                 2022        temperature_standard  98.7            F

 

                 2022        temperature_metric  37.06           C

 

                 2022        respiration_rate  16              /min

 

                 2022        height_standard  63.07           in

 

                 2022        height_metric   160.2           cm

 

                 2022        heart_rate      76              /min

 

                 2022        BP_systolic     100             mm[Hg]

 

                 2022        BP_diastolic    61              mm[Hg]

 

                 2022        weight_standard  120.4           lb

 

                 2022        weight_metric   54.61           kg

 

                 2022        temperature_standard  97.6            F

 

                 2022        temperature_metric  36.44           C

 

                 2022        respiration_rate  14              /min

 

                 2022        height_standard  63.07           in

 

                 2022        height_metric   160.2           cm

 

                 2022        heart_rate      69              /min

 

                 2022        BP_systolic     112             mm[Hg]

 

                 2022        BP_diastolic    67              mm[Hg]

 

                 2022        BMI             21.36           kg/m2

## 2022-03-31 NOTE — CT REPORT
PROCEDURE:  HEAD WO

 

INDICATIONS:  Syncope, fall, altered mental status

 

TECHNIQUE:  

Noncontrast 4.5 mm thick angled axial sections acquired from the foramen magnum to the vertex.  For r
adiation dose reduction, the following was used:  automated exposure control, adjustment of mA and/or
 kV according to patient size.

 

COMPARISON:  2/10/2022 CT head

 

FINDINGS:  

Image quality:  Excellent.  

 

CSF spaces:  Basal cisterns are patent.  No extra-axial fluid collections.  Ventricles are normal in 
size and shape.  

 

Brain:  No midline shift.  No intracranial masses or hemorrhage.  Gray-white matter interface is norm
al.  

 

Skull and face:  Calvarium and visualized facial bones are intact, without suspicious lesions.  

 

Sinuses:  Visualized sinuses and mastoids are clear.  

 

IMPRESSION:  No acute intracranial finding.

 

Reviewed by: Talib Rob MD on 3/31/2022 1:18 PM PDT

Approved by: Talib Rob MD on 3/31/2022 1:18 PM PDT

 

 

Station ID:  [id number]

## 2022-03-31 NOTE — ED PHYSICIAN DOCUMENTATION
PD HPI ALTERED MENTAL STATUS





- Stated complaint


Stated Complaint: FALL





- History obtained from


History obtained from: Patient, EMS, Caregiver





- History of Present Illness


Timing - onset: Today (Patient resident at [facility] was seen to have general 

weakness and kind of slumped from her wheelchair to the floor.  Did not fully 

lose consciousness but was confused.  Difficulty feeling a pulse initially from 

caregivers per medics.  Medics found her with adequate bBP/HR. FSBS 57. not 

diabetic.)


Timing - duration: Minutes


Timing - details: Abrupt onset, Still present


Quality / character: Less responsive, Other (no focal weakness.)


Associated symptoms: General weakness (seemed to have low BP as caregivers had 

trouble finding pulse in wrist (patient still semi-alert but confused) until 

they lay her down.).  No: Focal weakness, Seizure activity


Contributing factors: No: Diabetic, New medication, Recent med change, Recent 

illness


Basline status: Alert and oriented X 3, Walker


Treatment PTA: D50


Similar symptoms before: Has not had sx before





Review of Systems


Constitutional: denies: Fever, Chills


Nose: denies: Rhinorrhea / runny nose, Congestion


Throat: denies: Sore throat


Cardiac: denies: Chest pain / pressure


Respiratory: denies: Cough


GI: denies: Abdominal Pain, Vomiting, Diarrhea


Neurologic: denies: Headache, Head injury





PD PAST MEDICAL HISTORY





- Past Medical History


Cardiovascular: Hypertension, High cholesterol


Respiratory: None


Neuro: Parkinson's


Endocrine/Autoimmune: None


GI: Ulcers


GYN: None


: None


HEENT: Chronic vision loss


Psych: None


Musculoskeletal: Osteoarthritis, Chronic back pain


Derm: None





- Past Surgical History


Past Surgical History: Yes


General: Colonoscopy


Ortho: Hip replacement, Rotator cuff repair





- Present Medications


Home Medications: 


                                Ambulatory Orders











 Medication  Instructions  Recorded  Confirmed


 


Carbidopa/Levodopa 25/100 [Sinemet 2 tab PO 2300 03/18/15 03/31/22





25 mg/100 mg]   


 


Citalopram [CeleXA] 10 mg PO QPM 03/18/15 03/31/22


 


Carbidopa/Levodopa 25/100 [Sinemet 2 tab PO 0900,1300,1700,2100 04/23/19 03/31/22





25 mg/100 mg]   


 


Aspirin [[place] Aspirin] 81 mg PO DAILY 09/22/21 03/31/22


 


Atorvastatin [Lipitor] 20 mg PO DAILY PM 09/22/21 03/31/22


 


Metoprolol Succinate [Toprol Xl] 25 mg PO DAILY 09/22/21 03/31/22


 


Quetiapine Fumarate [Seroquel] 100 mg PO BID 03/31/22 03/31/22


 


Rivastigmine Tartrate 1.5 mg PO BID 03/31/22 03/31/22





[Rivastigmine]   














- Allergies


Allergies/Adverse Reactions: 


                                    Allergies











Allergy/AdvReac Type Severity Reaction Status Date / Time


 


No Known Drug Allergies Allergy   Verified 03/31/22 11:34














- Social History


Does the pt smoke?: No


Smoking Status: Never smoker


Does the pt drink ETOH?: No


Does the pt have substance abuse?: Yes





- Immunizations


Immunizations are current?: Yes





- POLST


Patient has POLST: No





Results





- Vitals


Vitals: 


                               Vital Signs - 24 hr











  03/31/22 03/31/22 03/31/22





  11:27 12:00 12:30


 


Temperature 36.4 C L  36.4 C L


 


Heart Rate 65 70 75


 


Respiratory 16 15 16





Rate   


 


Blood Pressure 130/58 L 122/94 H 122/94 H


 


O2 Saturation 100 98 98














  03/31/22 03/31/22 03/31/22





  13:00 14:00 14:30


 


Temperature   


 


Heart Rate 76 76 66


 


Respiratory 16 16 12





Rate   


 


Blood Pressure 162/73 H 162/73 H 155/75 H


 


O2 Saturation 99 99 100














  03/31/22 03/31/22 03/31/22





  15:00 15:12 15:15


 


Temperature  36.4 C L 36.4 C L


 


Heart Rate 69 71 71


 


Respiratory 13 15 15





Rate   


 


Blood Pressure  155/73 H 155/73 H


 


O2 Saturation  99 99








                                     Oxygen











O2 Source [With Activity]      Room air


 


O2 Source [Without Activity]   Room air


 


O2 Source                      Room air

















- EKG (time done)


  ** 11:42


Rate: Rate (enter#) (65)


Rhythm: NSR


Axis: Normal


Intervals: Normal MA


QRS: Normal


Ischemia: Normal ST segments.  No: ST elevation c/w ischemia, ST depression





- Labs


Labs: 


                                Laboratory Tests











  03/31/22 03/31/22 03/31/22





  11:28 11:55 11:55


 


WBC   5.6 


 


RBC   3.33 L 


 


Hgb   10.3 L 


 


Hct   32.1 L 


 


MCV   96.4 


 


MCH   30.9 


 


MCHC   32.1 


 


RDW   15.0 


 


Plt Count   163 


 


MPV   8.6 


 


Neut # (Auto)   3.7 


 


Lymph # (Auto)   1.2 L 


 


Mono # (Auto)   0.5 


 


Eos # (Auto)   0.1 


 


Baso # (Auto)   0.0 


 


Absolute Nucleated RBC   0.00 


 


Nucleated RBC %   0.0 


 


Sodium    138


 


Potassium    4.1


 


Chloride    105


 


Carbon Dioxide    25


 


Anion Gap    8.0


 


BUN    17


 


Creatinine    0.8


 


Estimated GFR (MDRD)    70 L


 


Glucose    113 H


 


POC Whole Bld Glucose  172 H  


 


Lactic Acid   


 


Calcium    8.0 L


 


Magnesium    1.9


 


Total Bilirubin    0.7


 


AST    15


 


ALT    < 10 L


 


Alkaline Phosphatase    87


 


Troponin I High Sens   


 


Total Protein    5.4 L


 


Albumin    3.4


 


Globulin    2.0 L


 


Albumin/Globulin Ratio    1.7


 


Lipase    45


 


Urine Color   


 


Urine Clarity   


 


Urine pH   


 


Ur Specific Gravity   


 


Urine Protein   


 


Urine Glucose (UA)   


 


Urine Ketones   


 


Urine Occult Blood   


 


Urine Nitrite   


 


Urine Bilirubin   


 


Urine Urobilinogen   


 


Ur Leukocyte Esterase   


 


Urine RBC   


 


Urine WBC   


 


Ur Squamous Epith Cells   


 


Urine Bacteria   


 


Ur Microscopic Review   


 


Urine Culture Comments   














  03/31/22 03/31/22 03/31/22





  11:55 11:55 14:23


 


WBC   


 


RBC   


 


Hgb   


 


Hct   


 


MCV   


 


MCH   


 


MCHC   


 


RDW   


 


Plt Count   


 


MPV   


 


Neut # (Auto)   


 


Lymph # (Auto)   


 


Mono # (Auto)   


 


Eos # (Auto)   


 


Baso # (Auto)   


 


Absolute Nucleated RBC   


 


Nucleated RBC %   


 


Sodium   


 


Potassium   


 


Chloride   


 


Carbon Dioxide   


 


Anion Gap   


 


BUN   


 


Creatinine   


 


Estimated GFR (MDRD)   


 


Glucose   


 


POC Whole Bld Glucose   


 


Lactic Acid  1.4  


 


Calcium   


 


Magnesium   


 


Total Bilirubin   


 


AST   


 


ALT   


 


Alkaline Phosphatase   


 


Troponin I High Sens   < 2.3 L 


 


Total Protein   


 


Albumin   


 


Globulin   


 


Albumin/Globulin Ratio   


 


Lipase   


 


Urine Color    DARK YELLOW


 


Urine Clarity    HAZY


 


Urine pH    6.0


 


Ur Specific Gravity    1.020


 


Urine Protein    NEGATIVE


 


Urine Glucose (UA)    NEGATIVE


 


Urine Ketones    NEGATIVE


 


Urine Occult Blood    NEGATIVE


 


Urine Nitrite    NEGATIVE


 


Urine Bilirubin    NEGATIVE


 


Urine Urobilinogen    0.2 (NORMAL)


 


Ur Leukocyte Esterase    MODERATE H


 


Urine RBC    0-5


 


Urine WBC    11-25 H


 


Ur Squamous Epith Cells    MANY Squamous H


 


Urine Bacteria    Moderate H


 


Ur Microscopic Review    INDICATED


 


Urine Culture Comments    NOT INDICATED














- Rads (name of study)


  ** chest xray


Radiology: Prelim report reviewed (no acute process), See rad report





  ** head CT


Radiology: Prelim report reviewed (no acute intracranial process. Age related 

changes. ), See rad report





PD MEDICAL DECISION MAKING





- ED course


Complexity details: reviewed results, re-evaluated patient (she was alert and 

conversant on arrival to ED and remained so. Short term memory seems poor, but 

still alert. ), considered differential (She is not diabetic but had a somewhat 

low blood sugar 57 on scene.  Had a general weakness with near syncope.  Unclear

etiology.  No new medications per med list.  Appears well here.), d/w patient





Departure





- Departure


Disposition: 01 Home, Self Care


Clinical Impression: 


 Near syncope





Condition: Stable


Record reviewed to determine appropriate education?: Yes


Instructions:  ED Near Syncope Unkn


Comments: 


Continue usual medications.  Be sure to have good regular dietary intake.  

Unclear the cause of your episode this morning.  You appear well here on basic 

blood tests, urine test, chest x-ray and head CT.





Recheck if recurrent episodes or symptoms or other things develop.


Discharge Date/Time: 03/31/22 15:15

## 2022-04-01 NOTE — XRAY REPORT
PROCEDURE:  Elbow 3 View RT

 

INDICATIONS:  RIGHT ELBOW LUMP

 

TECHNIQUE:  3 views of the elbow were acquired.  

 

COMPARISON:  None.

 

FINDINGS:  

Bones:  No fractures or dislocations.  No suspicious bony lesions.  

 

Soft tissues:  No elbow joint effusion. Mild dorsal soft tissue swelling over olecranon is seen. Tiny
 calcification adjacent to dorsal aspect of proximal olecranon near triceps tendon insertion is seen.


 

IMPRESSION:  

1. No elbow fracture or dislocation.

2. Small calcification adjacent to proximal olecranon which may represent old avulsion injury involvi
ng distal triceps tendon versus calcific tendinitis.

3. Dorsal elbow soft tissue swelling, olecranon bursitis cannot be excluded. No significant joint eff
usion.

 

Reviewed by: Salbador Ybarra MD on 4/1/2022 9:04 AM PDT

Approved by: Salbador Ybarra MD on 4/1/2022 9:04 AM PDT

 

 

Station ID:  [id number]

## 2022-04-01 NOTE — XRAY REPORT
PROCEDURE:  Shoulder 3 View RT

 

INDICATIONS:  RIGHT SHOULDER PAIN

 

TECHNIQUE:  4 views of the shoulder were acquired.  

 

COMPARISON:  None.

 

FINDINGS:  

 

Bones:  No fractures or dislocations.  Superior migration of humeral head in relation to glenoid is s
een. Moderate acromioclavicular joint and glenohumeral joint osteoarthritis. No suspicious bony lesio
ns.  Visualized ribs appear intact.  

 

Soft tissues:  No suspicious soft tissue calcifications.  

 

IMPRESSION:  

1. No acute right shoulder fracture or dislocation.

2. Moderate shoulder joint osteoarthritis. Superior migration of humeral head in relation to glenoid 
which can be seen associated with rotator cuff tendon rupture. MRI of shoulder can be done for furthe
r evaluation if indicated.

 

Reviewed by: Salbador Ybarra MD on 4/1/2022 9:05 AM PDT

Approved by: Salbador Ybarra MD on 4/1/2022 9:05 AM PDT

 

 

Station ID:  [id number]

## 2022-04-24 ENCOUNTER — HOSPITAL ENCOUNTER (EMERGENCY)
Dept: HOSPITAL 76 - ED | Age: 77
Discharge: HOME | End: 2022-04-24
Payer: MEDICARE

## 2022-04-24 ENCOUNTER — HOSPITAL ENCOUNTER (OUTPATIENT)
Dept: HOSPITAL 76 - EMS | Age: 77
Discharge: ANOTHER HOSPITAL | End: 2022-04-24
Payer: MEDICARE

## 2022-04-24 VITALS — SYSTOLIC BLOOD PRESSURE: 140 MMHG | DIASTOLIC BLOOD PRESSURE: 72 MMHG

## 2022-04-24 DIAGNOSIS — R51.9: ICD-10-CM

## 2022-04-24 DIAGNOSIS — F02.80: ICD-10-CM

## 2022-04-24 DIAGNOSIS — S01.81XA: Primary | ICD-10-CM

## 2022-04-24 DIAGNOSIS — S70.01XA: ICD-10-CM
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
## 2022-04-24 DIAGNOSIS — S16.1XXA: ICD-10-CM

## 2022-04-24 DIAGNOSIS — G20: ICD-10-CM
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
## 2022-04-24 LAB
ALBUMIN DIAFP-MCNC: 3.8 G/DL (ref 3.2–5.5)
ALBUMIN/GLOB SERPL: 1.4 {RATIO} (ref 1–2.2)
ALP SERPL-CCNC: 97 IU/L (ref 42–121)
ALT SERPL W P-5'-P-CCNC: < 10 IU/L (ref 10–60)
ANION GAP SERPL CALCULATED.4IONS-SCNC: 9 MMOL/L (ref 6–13)
AST SERPL W P-5'-P-CCNC: 14 IU/L (ref 10–42)
BASOPHILS NFR BLD AUTO: 0.1 10^3/UL (ref 0–0.1)
BASOPHILS NFR BLD AUTO: 1.2 %
BILIRUB BLD-MCNC: 0.8 MG/DL (ref 0.2–1)
BUN SERPL-MCNC: 15 MG/DL (ref 6–20)
CALCIUM UR-MCNC: 8.7 MG/DL (ref 8.5–10.3)
CHLORIDE SERPL-SCNC: 103 MMOL/L (ref 101–111)
CLARITY UR REFRACT.AUTO: CLEAR
CO2 SERPL-SCNC: 26 MMOL/L (ref 21–32)
CREAT SERPLBLD-SCNC: 0.7 MG/DL (ref 0.4–1)
EOSINOPHIL # BLD AUTO: 0.1 10^3/UL (ref 0–0.7)
EOSINOPHIL NFR BLD AUTO: 1.9 %
ERYTHROCYTE [DISTWIDTH] IN BLOOD BY AUTOMATED COUNT: 14.7 % (ref 12–15)
GFRSERPLBLD MDRD-ARVRAT: 81 ML/MIN/{1.73_M2} (ref 89–?)
GLOBULIN SER-MCNC: 2.7 G/DL (ref 2.1–4.2)
GLUCOSE SERPL-MCNC: 94 MG/DL (ref 70–100)
GLUCOSE UR QL STRIP.AUTO: NEGATIVE MG/DL
HCT VFR BLD AUTO: 35.8 % (ref 37–47)
HGB UR QL STRIP: 11.6 G/DL (ref 12–16)
INR PPP: 1.1 (ref 0.8–1.2)
KETONES UR QL STRIP.AUTO: NEGATIVE MG/DL
LIPASE SERPL-CCNC: 29 U/L (ref 22–51)
LYMPHOCYTES # SPEC AUTO: 1.1 10^3/UL (ref 1.5–3.5)
LYMPHOCYTES NFR BLD AUTO: 16.1 %
MCH RBC QN AUTO: 30.9 PG (ref 27–31)
MCHC RBC AUTO-ENTMCNC: 32.4 G/DL (ref 32–36)
MCV RBC AUTO: 95.5 FL (ref 81–99)
MONOCYTES # BLD AUTO: 0.6 10^3/UL (ref 0–1)
MONOCYTES NFR BLD AUTO: 8.9 %
NEUTROPHILS # BLD AUTO: 4.9 10^3/UL (ref 1.5–6.6)
NEUTROPHILS # SNV AUTO: 6.9 X10^3/UL (ref 4.8–10.8)
NEUTROPHILS NFR BLD AUTO: 71.5 %
NITRITE UR QL STRIP.AUTO: NEGATIVE
NRBC # BLD AUTO: 0 /100WBC
NRBC # BLD AUTO: 0 X10^3/UL
PDW BLD AUTO: 9.3 FL (ref 7.9–10.8)
PH UR STRIP.AUTO: 7 PH (ref 5–7.5)
PLATELET # BLD: 194 10^3/UL (ref 130–450)
POTASSIUM SERPL-SCNC: 4.4 MMOL/L (ref 3.5–5)
PROT SPEC-MCNC: 6.5 G/DL (ref 6.7–8.2)
PROT UR STRIP.AUTO-MCNC: NEGATIVE MG/DL
PROTHROM ACT/NOR PPP: 12.8 SECS (ref 9.9–12.6)
RBC # UR STRIP.AUTO: NEGATIVE /UL
RBC # URNS HPF: (no result) /HPF (ref 0–5)
RBC MAR: 3.75 10^6/UL (ref 4.2–5.4)
SODIUM SERPLBLD-SCNC: 138 MMOL/L (ref 135–145)
SP GR UR STRIP.AUTO: 1.02 (ref 1–1.03)
SQUAMOUS URNS QL MICRO: (no result)
UROBILINOGEN UR QL STRIP.AUTO: (no result) E.U./DL
UROBILINOGEN UR STRIP.AUTO-MCNC: NEGATIVE MG/DL
WBC # UR MANUAL: (no result) /HPF (ref 0–5)

## 2022-04-24 PROCEDURE — 87040 BLOOD CULTURE FOR BACTERIA: CPT

## 2022-04-24 PROCEDURE — 96374 THER/PROPH/DIAG INJ IV PUSH: CPT

## 2022-04-24 PROCEDURE — 12013 RPR F/E/E/N/L/M 2.6-5.0 CM: CPT

## 2022-04-24 PROCEDURE — 73502 X-RAY EXAM HIP UNI 2-3 VIEWS: CPT

## 2022-04-24 PROCEDURE — 80053 COMPREHEN METABOLIC PANEL: CPT

## 2022-04-24 PROCEDURE — 72125 CT NECK SPINE W/O DYE: CPT

## 2022-04-24 PROCEDURE — 83690 ASSAY OF LIPASE: CPT

## 2022-04-24 PROCEDURE — 83605 ASSAY OF LACTIC ACID: CPT

## 2022-04-24 PROCEDURE — 36415 COLL VENOUS BLD VENIPUNCTURE: CPT

## 2022-04-24 PROCEDURE — 81001 URINALYSIS AUTO W/SCOPE: CPT

## 2022-04-24 PROCEDURE — 81003 URINALYSIS AUTO W/O SCOPE: CPT

## 2022-04-24 PROCEDURE — 87086 URINE CULTURE/COLONY COUNT: CPT

## 2022-04-24 PROCEDURE — 85025 COMPLETE CBC W/AUTO DIFF WBC: CPT

## 2022-04-24 PROCEDURE — 99284 EMERGENCY DEPT VISIT MOD MDM: CPT

## 2022-04-24 PROCEDURE — 70450 CT HEAD/BRAIN W/O DYE: CPT

## 2022-04-24 PROCEDURE — 85610 PROTHROMBIN TIME: CPT

## 2022-04-24 PROCEDURE — 71045 X-RAY EXAM CHEST 1 VIEW: CPT

## 2022-04-24 RX ADMIN — CARBIDOPA AND LEVODOPA STA TAB: 25; 100 TABLET ORAL at 13:32

## 2022-04-24 RX ADMIN — KETOROLAC TROMETHAMINE STA MG: 30 INJECTION, SOLUTION INTRAMUSCULAR at 13:11

## 2022-04-24 NOTE — XRAY REPORT
PROCEDURE:  Chest 1 View X-Ray

 

INDICATIONS:  chest pain

 

TECHNIQUE:  One view of the chest was acquired.  

 

COMPARISON:  3/31/2022

 

FINDINGS:  

 

Surgical changes and devices:  None.  

 

Lungs and pleura:  No pleural effusions or pneumothorax.  Lungs are clear.  Mild hyperinflation chron
ic interstitial changes present.

 

Mediastinum:  Mediastinal contours appear normal.  Heart size is normal.  Atherosclerotic vascular ca
lcification noted in the aortic arch.

 

Bones and chest wall:  No suspicious bony lesions.  Overlying soft tissues appear unremarkable.  Old 
healed right-sided rib fracture noted

 

IMPRESSION:  

Hyperinflation chronic interstitial changes without acute cardiopulmonary findings

 

Reviewed by: Guy Alexis MD on 4/24/2022 11:32 AM JUAN

Approved by: Guy Alexis MD on 4/24/2022 11:32 AM JUAN

 

 

Station ID:  [id number]

## 2022-04-24 NOTE — CT REPORT
PROCEDURE:  CT cervical spine without contrast

 

INDICATIONS:  fall face injury neck pain

 

TECHNIQUE:  

Noncontrast 3 mm thick sections acquired from the skull base to the T4 level.  Sagittal and coronal r
eformats were then constructed.  For radiation dose reduction, the following was used:  automated exp
osure control, adjustment of mA and/or kV according to patient size.

 

COMPARISON:  None.

 

FINDINGS:  

Image quality:  Excellent.  

 

Bones:  No fractures or dislocations.  Visualized superior ribs are intact.  Disc space narrowing and
 hypertrophic facet joints noted in the lower cervical spine. Mild to moderate central stenosis prese
nt at C6-7

 

Soft tissues:  Prevertebral soft tissues are normal in thickness.  No paravertebral hematomas.  No ap
ical pneumothoraces. Dense atherosclerotic vascular calcification noted. 

 

IMPRESSION:  

 

1. Multilevel degenerative disc disease and arthropathy in the lower cervical spine without fracture 
or malalignment.

 

Reviewed by: Guy Alexis MD on 4/24/2022 11:27 AM JUAN

Approved by: Guy Alexis MD on 4/24/2022 11:27 AM JUAN

 

 

Station ID:  [id number]

## 2022-04-24 NOTE — XRAY REPORT
PROCEDURE:  Hip w/Pelvis 2-3V RT

 

INDICATIONS:  fall right hip pain

 

TECHNIQUE:  AP pelvis with lateral view(s) of the right hip(s).  

 

COMPARISON:  None.

 

FINDINGS:  

 

Bones:  No fractures or dislocations.  Pelvic ring appears intact.  No suspicious bony lesions. Bipol
ar right hip hemiarthroplasty in good position no evidence of hardware failure or loosening.

 

Heterotopic bone noted in the lateral soft tissue left hip been no pelvic ring intact. Degenerative c
hanges noted lower lumbar spine 

 

Soft tissues:  The visualized bowel gas pattern is normal.  No suspicious soft tissue calcifications.
  

 

IMPRESSION:  

1. Right hip arthroplasty in good position. No fracture or dislocation.

 

Reviewed by: Guy Alexis MD on 4/24/2022 11:34 AM JUAN

Approved by: Guy Alexis MD on 4/24/2022 11:34 AM JUAN

 

 

Station ID:  [id number]

## 2022-04-24 NOTE — ED PHYSICIAN DOCUMENTATION
PD HPI Fall





- Stated complaint


Stated Complaint: FALL





- Chief complaint


Chief Complaint: Trauma Hd/Nk





- History obtained from


History obtained from: Patient, EMS





- History of Present Illness


Mechanism of injury: Unknown


Fall distance: Standing position


Where injury occurred: Home


Timing - onset: Today


Injury(ies) location: Face, Neck, Right Lower Extremity


Quality of pain: Pain


Associated symptoms: AMS, Neck pain.  No: LOC, Amnesia, Seizures, Ear drainage, 

Nasal drainage, Weakness, Paresthesias, Dyspnea, Nausea / vomiting, Hematemesis,

Abdominal distension


Symptoms improve with: Rest


Worsens with: Movement, Palpation


Contributing factors: No: Anticoagulated, Intoxicated


Similar symptoms before: Diagnosis (fall related to parkinsons)


Recently seen: Emergency Dept (fall last month)





- Additional information


Additional information: 





76-year-old female with history of Parkinson's and some mild dementia is a 

resident of assisted living in [facility].  She is usually given her 

medications daily and today she was in the dining room and she got up and walked

out of the dining room and collapsed outside.  She is usually confined to a 

wheelchair and is discouraged from ambulation.  Her  presents to the 

emergency department today with the patient and indicates that she is about at 

her baseline.  He was not present when she initially showed up here.  He does 

not live with the patient.  She has had prior visits to the emergency department

with dehydration and increased fluids have been encouraged.





Review of Systems


Constitutional: denies: Fever


Ears: denies: Ear pain


Nose: denies: Congestion


Throat: denies: Sore throat


Cardiac: denies: Chest pain / pressure, Palpitations


Respiratory: denies: Dyspnea, Cough


GI: denies: Vomiting, Diarrhea


: denies: Dysuria


Skin: denies: Rash


Musculoskeletal: reports: Neck pain, Joint pain.  denies: Back pain, Extremity 

pain, Extremity swelling


Neurologic: denies: Generalized weakness, Focal weakness, Numbness





PD PAST MEDICAL HISTORY





- Past Medical History


Cardiovascular: Hypertension, High cholesterol


Respiratory: None


Neuro: Parkinson's


Endocrine/Autoimmune: None


GI: Ulcers


GYN: None


: None


HEENT: Chronic vision loss


Psych: None


Musculoskeletal: Osteoarthritis, Chronic back pain


Derm: None





- Past Surgical History


Past Surgical History: Yes


General: Colonoscopy


Ortho: Hip replacement, Rotator cuff repair





- Present Medications


Home Medications: 


                                Ambulatory Orders











 Medication  Instructions  Recorded  Confirmed


 


Carbidopa/Levodopa 25/100 [Sinemet 2 tab PO 2300 03/18/15 04/24/22





25 mg/100 mg]   


 


Citalopram [CeleXA] 10 mg PO QPM 03/18/15 04/24/22


 


Carbidopa/Levodopa 25/100 [Sinemet 2 tab PO 0900,1300,1700,2100 04/23/19 04/24/22





25 mg/100 mg]   


 


Aspirin [Desha Aspirin] 81 mg PO DAILY 09/22/21 04/24/22


 


Atorvastatin [Lipitor] 20 mg PO DAILY PM 09/22/21 04/24/22


 


Metoprolol Succinate [Toprol Xl] 25 mg PO DAILY 09/22/21 04/24/22


 


Quetiapine Fumarate [Seroquel] 100 mg PO BID 03/31/22 04/24/22


 


Rivastigmine Tartrate 1.5 mg PO BID 03/31/22 04/24/22





[Rivastigmine]   














- Allergies


Allergies/Adverse Reactions: 


                                    Allergies











Allergy/AdvReac Type Severity Reaction Status Date / Time


 


No Known Drug Allergies Allergy   Verified 04/24/22 11:06














- Social History


Does the pt smoke?: No


Smoking Status: Never smoker


Does the pt drink ETOH?: No


Does the pt have substance abuse?: Yes





- Immunizations


Immunizations are current?: Yes





- POLST


Patient has POLST: No





PD ED PE NORMAL





- Vitals


Vital signs reviewed: Yes (hypertensive )





- General


General: No acute distress, Well developed/nourished, Other (76-year-old female 

with a vacant stare of Parkinson's laying on a backboard with a hard collar in 

place appears to be in some pain and she has blood from the bottom portion of 

her chin.)





- HEENT


HEENT: PERRL, EOMI, Other (There is a 3 cm laceration under the chin into the 

soft tissue without foreign material.)





- Neck


Neck: Supple, no meningeal sign, Other (There is midline bony point tenderness 

to the lower cervical spine.  Patient's cervical spine is cleared by 

radiographic evaluation by CT scanning.  She is removed from the hard cervical 

collar following the report being presented.)





- Cardiac


Cardiac: RRR, No murmur





- Respiratory


Respiratory: No respiratory distress, Clear bilaterally





- Abdomen


Abdomen: Normal bowel sounds, Soft, Non tender, Non distended, No organomegaly





- Back


Back: No CVA TTP





- Derm


Derm: Normal color, Warm and dry, No rash





- Extremities


Extremities: Other (There is deformity to the right lower extremity with 

shortening and external rotation the patient has good full range of motion of 

the hip without increase in her pain.  She does have pain to palpation over the 

trochanter.)





- Neuro


Neuro: CNs 2-12 intact, No motor deficit, No sensory deficit, Other (Speech is 

initially shaky and sparse following administration of carbidopa levodopa speech

 is improved.)


Eye Opening: To Voice


Motor: Obeys Commands


Verbal: Confused


GCS Score: 13





- Psych


Psych: Normal mood, Normal affect





Results





- Vitals


Vitals: 


                               Vital Signs - 24 hr











  04/24/22 04/24/22 04/24/22





  11:06 12:11 13:05


 


Temperature 36 C L  


 


Heart Rate 65 77 68


 


Respiratory 22 20 11 L





Rate   


 


Blood Pressure 171/80 H 156/92 H 137/64 H


 


O2 Saturation 99 100 100














  04/24/22 04/24/22 04/24/22





  13:30 14:00 14:30


 


Temperature   


 


Heart Rate 64 73 70


 


Respiratory 13 16 13





Rate   


 


Blood Pressure 143/75 H 141/72 H 140/72 H


 


O2 Saturation 100 100 100








                                     Oxygen











O2 Source []                   Room air


 


O2 Source []                   Room air


 


O2 Source                      Room air

















- Labs


Labs: 


                                Laboratory Tests











  04/24/22 04/24/22 04/24/22





  11:06 11:06 12:18


 


WBC  6.9  


 


RBC  3.75 L  


 


Hgb  11.6 L  


 


Hct  35.8 L  


 


MCV  95.5  


 


MCH  30.9  


 


MCHC  32.4  


 


RDW  14.7  


 


Plt Count  194  


 


MPV  9.3  


 


Neut # (Auto)  4.9  


 


Lymph # (Auto)  1.1 L  


 


Mono # (Auto)  0.6  


 


Eos # (Auto)  0.1  


 


Baso # (Auto)  0.1  


 


Absolute Nucleated RBC  0.00  


 


Nucleated RBC %  0.0  


 


PT    12.8 H


 


INR    1.1


 


Sodium   138 


 


Potassium   4.4 


 


Chloride   103 


 


Carbon Dioxide   26 


 


Anion Gap   9.0 


 


BUN   15 


 


Creatinine   0.7 


 


Estimated GFR (MDRD)   81 L 


 


Glucose   94 


 


Lactic Acid   


 


Calcium   8.7 


 


Total Bilirubin   0.8 


 


AST   14 


 


ALT   < 10 L 


 


Alkaline Phosphatase   97 


 


Total Protein   6.5 L 


 


Albumin   3.8 


 


Globulin   2.7 


 


Albumin/Globulin Ratio   1.4 


 


Lipase   29 


 


Urine Color   


 


Urine Clarity   


 


Urine pH   


 


Ur Specific Gravity   


 


Urine Protein   


 


Urine Glucose (UA)   


 


Urine Ketones   


 


Urine Occult Blood   


 


Urine Nitrite   


 


Urine Bilirubin   


 


Urine Urobilinogen   


 


Ur Leukocyte Esterase   


 


Urine RBC   


 


Urine WBC   


 


Ur Squamous Epith Cells   


 


Urine Bacteria   


 


Ur Microscopic Review   


 


Urine Culture Comments   














  04/24/22 04/24/22





  12:18 12:26


 


WBC  


 


RBC  


 


Hgb  


 


Hct  


 


MCV  


 


MCH  


 


MCHC  


 


RDW  


 


Plt Count  


 


MPV  


 


Neut # (Auto)  


 


Lymph # (Auto)  


 


Mono # (Auto)  


 


Eos # (Auto)  


 


Baso # (Auto)  


 


Absolute Nucleated RBC  


 


Nucleated RBC %  


 


PT  


 


INR  


 


Sodium  


 


Potassium  


 


Chloride  


 


Carbon Dioxide  


 


Anion Gap  


 


BUN  


 


Creatinine  


 


Estimated GFR (MDRD)  


 


Glucose  


 


Lactic Acid  0.9 


 


Calcium  


 


Total Bilirubin  


 


AST  


 


ALT  


 


Alkaline Phosphatase  


 


Total Protein  


 


Albumin  


 


Globulin  


 


Albumin/Globulin Ratio  


 


Lipase  


 


Urine Color   YELLOW


 


Urine Clarity   CLEAR


 


Urine pH   7.0


 


Ur Specific Gravity   1.020


 


Urine Protein   NEGATIVE


 


Urine Glucose (UA)   NEGATIVE


 


Urine Ketones   NEGATIVE


 


Urine Occult Blood   NEGATIVE


 


Urine Nitrite   NEGATIVE


 


Urine Bilirubin   NEGATIVE


 


Urine Urobilinogen   0.2 (NORMAL)


 


Ur Leukocyte Esterase   TRACE H


 


Urine RBC   0-5


 


Urine WBC   4-5


 


Ur Squamous Epith Cells   FEW Squamous


 


Urine Bacteria   Rare


 


Ur Microscopic Review   INDICATED


 


Urine Culture Comments   INDICATED














- Rads (name of study)


  ** cevical spine


Radiology: Prelim report reviewed (Impression: 1.  Multilevel degenerative disc 

disc disease and arthropathy in the lower cervical spine without fracture or 

malalignment.), EMP read indepedently, See rad report





  ** head


Radiology: Prelim report reviewed (Impression: Atrophy and chronic ischemic 

change without acute hemorrhage or mass-effect.), EMP read indepedently, See rad

 report





  ** Right hip


Radiology: Prelim report reviewed (Impression: 1.  Right hip arthroplasty in 

good position.  No fracture or dislocation.), EMP read indepedently, See rad 

report





  ** chest


Radiology: Prelim report reviewed (Impression: Hyperinflation chronic and 

interstitial changes without acute cardiopulmonary findings.), EMP read 

indepedently, See rad report





Procedures





- Laceration (location)


  ** chin 


Length in cm: 3


Wound type: Irregular, Into subcut fat, Clean


Neurovascular status: Sensory intact, Motor intact, Vascular intact


Wound preparation: Hibiclens, Irrigated copiously NS, Wound explored, To the 

base


Skin layer closure: Dermabond


Other: Patient tolerated well, No complications, Neurovascular intact, Tetanus 

UTD





- IVC sono (time)


  ** 1122


Bedside IVC sono: IVC measures (cm) (1.9), Euvolemia





PD MEDICAL DECISION MAKING





- ED course


Complexity details: reviewed old records, reviewed results, re-evaluated 

patient, considered differential, d/w patient


ED course: 





76-year-old female with a history of Parkinson's and has had another fall today.

  She has been discouraged from walking at all.  Today she escaped the dining 

room and went outside she indicated to me at some point that this was to mimi 

someone.  Her  indicates that she has "friends "related to her use of 

carbidopa levodopa.  He feels that she is back to her baseline.  The patient 

arrived in the emergency department with her eyes closed responding to voice she

 is now awake alert and responding normally.  CT scan of the head cervical spine

 are without evidence of fracture or hemorrhage.  X-ray of the chest is without 

abnormality as is x-ray of the right hip that shows hardware in place.  The 

patient indicates she has had prior shortening and external rotation of her leg.





Her chin laceration is reapproximated and covered with [email or web address]  

feels she is safe for discharge.





Departure





- Departure


Disposition: 01 Home, Self Care


Clinical Impression: 


 Parkinson disease





Contusion of right hip


Qualifiers:


 Encounter type: initial encounter Qualified Code(s): S70.01XA - Contusion of 

right hip, initial encounter





Cervical strain, acute


Qualifiers:


 Encounter type: initial encounter Qualified Code(s): S16.1XXA - Strain of 

muscle, fascia and tendon at neck level, initial encounter





Chin laceration


Qualifiers:


 Encounter type: initial encounter Qualified Code(s): S01.81XA - Laceration 

without foreign body of other part of head, initial encounter





Instructions:  ED Contusion Hip, ED Laceration Facial Skin Glue, ED Sprain 

Strain Neck


Follow-Up: 


Harry Salinas MD [Primary Care Provider] - 


Comments: 


Noé, today it looks like you have had another fall and have lacerated your 

chin.  We did not find evidence of dehydration today.  The laceration is 

expected to heal within 1 week.  Allow the glue to work its way off without 

pulling it off.


Discharge Date/Time: 04/24/22 15:09

## 2022-04-24 NOTE — EXTERNAL MEDICAL SUMMARY RPT
Continuity of Care Document

                            Created on:2022



Patient:FERNANDO THOMPSON

Sex:Female

:1945

External Reference #:[id number]





Demographics







                          Address                   [address] [place] [address] [place] [address]

 

                          Phone                     Unavailable

 

                          Preferred Language        Unknown

 

                          Marital Status            Unknown

 

                          Nondenominational Affiliation     Unknown

 

                          Race                      Unknown

 

                          Ethnic Group              Unknown









Author







                          Organization              [facility]

 

                          [place]                    [place] [address]

 

                          Phone                     [phone]









Care Team Providers







                    Name                Role                Phone

 

                    M.D.                Unavailable         Unavailable









Allergies

No information.



Encounters

No information.



Medications







                     date                description         facility

 

                     2022            metoprolol succinate  All

 

                     2022            atorvastatin        All

 

                     2022            quetiapine          All







Problems







                     date                description         facility

 

                     2022            XR SHOULDER 2-3 VIEW  All

 

                     2022            XR ELBOW 2 VIEWS    All

 

                     2022            Traumatic hematoma  All

 

                     2022            Total score?        All

 

                     2022            Tobacco use and exposure  All

 

                     2022            Tobacco smoking status NHIS  All

 

                     2022            Pain of right shoulder joint  All

 

                     2022            Pain in right shoulder  All

 

                     2022            Pain in right elbow  All

 

                     2022            Pain in joint involving upper arm  All

 

                     2022            Pain in joint involving shoulder region

  All

 

                     2022            Other injury of unspecified body region

, initial encounter  All

 

                     2022            Health-related behavior  All

 

                     2022            Former smoker       All

 

                     2022            Exercise            All

 

                     2022            Elbow joint pain    All

 

                     2022            Details of drug misuse behavior  All

 

                     2022            Contusion of unspecified site  All

 

                     2022            Alcohol use         All

 

                     2022            Total score?        All

 

                     2022            Tobacco use and exposure  All

 

                     2022            Tobacco smoking status NHIS  All

 

                     2022            Repeated falls      All

 

                     2022            Recurrent falls     All

 

                     2022            Former smoker       All

 

                     2022            Details of drug misuse behavior  All

 

                     2022            Alcohol use         All

 

                     2022            Abnormality of gait  All







Procedures







                     date                description         facility

 

                     2022            XR SHOULDER 2-3 VIEW  All







Results







            test       status     date       ordered by  attending  specimen anthony

e

 

            urea_nitrogen_blood  unknown    2022   unknown    unknown    unk

nown

 

            Erythrocytes_volume_in  unknown    2022   unknown    unknown    

unknown



           _Blood_by_Automated_cou                                             



           nt                                                     

 

            Erythrocyte_distributi  unknown    2022   unknown    unknown    

unknown



           on_width_Ratio_by_Autom                                             



           ated_count                                             

 

            MCV_Entitic_volume_by_  unknown    2022   unknown    unknown    

unknown



           Automated_count                                             

 

            MCH_Entitic_mass_by_Au  unknown    2022   unknown    unknown    

unknown



           tomated_count                                             

 

            Platelets_volume_in_Bl  unknown    2022   unknown    unknown    

unknown



           ood_by_Automated_count                                             

 

            Platelet_mean_volume_E  unknown    2022   unknown    unknown    

unknown



           ntitic_volume_in_Blood_                                             



           by_Rees-Sharon                                             

 

            neutrophil_count_blood  unknown    2022   unknown    unknown    

unknown

 

            monocyte_count_blood  unknown    2022   unknown    unknown    un

known

 

            lymphocyte_count_blood  unknown    [id number]   unknown    unknown    

unknown

 

            Hemoglobin_Mass_volume  unknown    [id number]   unknown    unknown    

unknown



           _in_Blood                                              

 

            eosinophil_count_blood  unknown    2022   unknown    unknown    

unknown

 

            Basophils_volume_in_Bl  unknown    2022   unknown    unknown    

unknown



           ood_by_Manual_count                                             

 

            leukocyte_count_blood  unknown    2022   unknown    unknown    u

nknown

 

            erythrocyte_RBC_count  unknown    2022   unknown    unknown    u

nknown

 

            Leukocytes_volume_in_B  unknown    2022   unknown    unknown    

unknown



           lood_by_Automated_count                                             

 

            Glomerular_Filtration_  unknown    2022   unknown    unknown    

unknown



           rate                                                   

 

            platelet_count  unknown    [id number]   unknown    unknown    unknown

 

            hemoglobin_blood  unknown    2022   unknown    unknown    unknow

n

 

            hematocrit_blood  unknown    2022   unknown    unknown    unknow

n

 

            potassium_blood  unknown    2022   unknown    unknown    unknown

 

            WBC_urine_on_microscop  unknown    2022   unknown    unknown    

unknown



           y                                                      

 

            Urobilinogen_Presence_  unknown    [id number]   unknown    unknown    

unknown



           in_Urine_by_Test_strip                                             

 

            Specific_gravity_of_Ur  unknown    [id number]   unknown    unknown    

unknown



           ine_by_Test_strip                                             

 

            Nitrite_Presence_in_Ur  unknown    [id number]   unknown    unknown    

unknown



           ine_by_Test_strip                                             

 

            Leukocyte_esterase_Pre  unknown    [id number]   unknown    unknown    

unknown



           sence_in_Urine_by_Test_                                             



           strip                                                  

 

            Ketones_Mass_volume_in  unknown    61255031   unknown    unknown    

unknown



           _Urine_by_Test_strip                                             

 

            Color_of_Urine  unknown    2022   unknown    unknown    unknown

 

            Bilirubin.total_Presen  unknown    2022   unknown    unknown    

unknown



           ce_in_Urine_by_Test_str                                             



           ip                                                     

 

            clarity_urine_point  unknown    2022   unknown    unknown    unk

nown

 

            pH_study_of_acidity  unknown    [id number]   unknown    unknown    unk

nown

 

           Glomerular_filtration_r  unknown    37679135   unknown    unknown    

unknown



           ate_1.73_sq_M.predicted                                             



           _among_non-blacks_Volum                                             



           e_Rate_Area_in_Serum_Pl                                             



           asma_or_Blood_by_Creati                                             



           nine-based_formula_MDRD                                             



           _                                                      

 

            Hematocrit_Volume_Frac  unknown    2022   unknown    unknown    

unknown



           tion_of_Blood_by_Automa                                             



           ted_count                                              

 

            bilirubin_serum_total  unknown    2022   unknown    unknown    u

nknown

 

            alanine_aminotransfera  unknown    2022   unknown    unknown    

unknown



           se_SGPT_serum                                             

 

            carbon_dioxide_serum_t  unknown    2022   unknown    unknown    

unknown



           otal                                                   

 

            aspartate_aminotransfe  unknown    2022   unknown    unknown    

unknown



           rase_SGOT_serum                                             

 

            protein_total_serum  unknown    2022   unknown    unknown    unk

nown

 

            blood_glucose  unknown    2022   unknown    unknown    unknown

 

            potassium_blood  unknown    2022   unknown    unknown    unknown

 

            glucose_urine  unknown    2022   unknown    unknown    unknown

 

            leukocyte_esterase_uri  unknown    2022   unknown    unknown    

unknown



           ne_by_dipstick                                             

 

            urobilinogen_urine_sem  unknown    2022   unknown    unknown    

unknown



           iquantitative_dipstick_                                             

 

            specific_gravity_urine  unknown    2022   unknown    unknown    

unknown

 

            nitrite_urine_semiquan  unknown    2022   unknown    unknown    

unknown



           titative                                               

 

            ketones_urine_by_test_  unknown    2022   unknown    unknown    

unknown



           strip                                                  

 

            magnesium_serum  unknown    2022   unknown    unknown    unknown

 

            bilirubin_urine  unknown    2022   unknown    unknown    unknown

 

            mean_corpuscular_volum  unknown    2022   unknown    unknown    

unknown



           e_RBC                                                  

 

            Urea_nitrogen_Mass_vol  unknown    2022   unknown    unknown    

unknown



           ume_in_Serum_or_Plasma                                             

 

            globulin_serum  unknown    2022   unknown    unknown    unknown

 

            alkaline_phosphatase_s  unknown    2022   unknown    unknown    

unknown



           vanessa                                                   

 

            Sodium_Moles_volume_in  unknown    2022   unknown    unknown    

unknown



           _Serum_or_Plasma                                             

 

            Protein_Mass_volume_in  unknown    2022   unknown    unknown    

unknown



           _Serum_or_Plasma                                             

 

            eosinophil_count_blood  unknown    2022   unknown    unknown    

unknown

 

            anion_gap_serum  unknown    2022   unknown    unknown    unknown

 

            mean_platelet_volume  unknown    [id number]   unknown    unknown    un

known

 

            urine_color  unknown    2022   unknown    unknown    unknown

 

            Magnesium_Moles_volume  unknown    2022   unknown    unknown    

unknown



           _in_Serum_or_Plasma                                             

 

            basophil_count_blood  unknown    2022   unknown    unknown    un

known

 

            monocyte_count_blood  unknown    2022   unknown    unknown    un

known

 

            lymphocyte_count_blood  unknown    [id number]   unknown    unknown    

unknown

 

            neutrophil_count_blood  unknown    54697365   unknown    unknown    

unknown

 

            Glucose_Mass_volume_in  unknown    03846370   unknown    unknown    

unknown



           _Urine                                                 

 

            Glucose_Mass_volume_in  unknown    10597892   unknown    unknown    

unknown



           _Serum_or_Plasma                                             

 

            Globulin_Mass_volume_i  unknown    2022   unknown    unknown    

unknown



           n_Serum                                                

 

            Creatinine_Mass_volume  unknown    69224707   unknown    unknown    

unknown



           _in_Serum_or_Plasma                                             

 

            Chloride_Moles_volume_  unknown    2022   unknown    unknown    

unknown



           in_Serum_or_Plasma                                             

 

            carbon_dioxide_serum_t  unknown    98441722   unknown    unknown    

unknown



           otal                                                   

 

            Calcium_Moles_volume_i  unknown    24446708   unknown    unknown    

unknown



           n_Serum_or_Plasma                                             

 

            albumin_serum  unknown    2022   unknown    unknown    unknown

 

            Bilirubin.total_Mass_v  unknown    2022   unknown    unknown    

unknown



           olume_in_Serum_or_Plasm                                             



           a                                                      

 

            Aspartate_aminotransfe  unknown    2022   unknown    unknown    

unknown



           rase_Enzymatic_activity                                             



           _volume_in_Serum_or_Pla                                             



           sma                                                    

 

            Anion_gap_4_in_Serum_o  unknown    49944768   unknown    unknown    

unknown



           r_Plasma                                               

 

            creatinine_serum  unknown    2022   unknown    unknown    unknow

n

 

            Alkaline_phosphatase_E  unknown    33463688   unknown    unknown    

unknown



           nzymatic_activity_volum                                             



           e_in_Blood                                             

 

            Albumin_Globulin_Mass_  unknown    2022   unknown    unknown    

unknown



           Ratio_in_Serum_or_Plasm                                             



           a                                                      

 

            Albumin_Mass_volume_in  unknown    16525358   unknown    unknown    

unknown



           _Serum_or_Plasma                                             

 

            Alanine_aminotransfera  unknown    39678891   unknown    unknown    

unknown



           se_Enzymatic_activity_v                                             



           olume_in_Serum_or_Plasm                                             



           a                                                      

 

            mean_corpuscular_hemog  unknown    08044301   unknown    unknown    

unknown



           lobin_concentration_rbc                                             

 

            sodium_serum  unknown    52069882   unknown    unknown    unknown

 

            albumin_globulin_ratio  unknown    58602808   unknown    unknown    

unknown



           _serum                                                 

 

            chloride_serum  unknown    22608268   unknown    unknown    unknown

 

            calcium_serum  unknown    48639324   unknown    unknown    unknown

 

            mean_corpuscular_hemog  unknown    31341502   unknown    unknown    

unknown



           lobin_RBC                                              

 

            red_blood_cell_distrib  unknown    28917341   unknown    unknown    

unknown



           ution_width                                             

 

            WBC_urine_on_microscop  unknown    23334126   unknown    unknown    

unknown



           y                                                      

 

            T          unknown    40951560   unknown    unknown    unknown

 

            WBC_URINE  unknown    51178968   unknown    unknown    unknown

 

            UROBILINOGEN_URINE  unknown    64984659   unknown    unknown    unkn

own

 

            SPECIFIC_GRAVITY_URINE  unknown    89382824   unknown    unknown    

unknown

 

            T          unknown    67214338   unknown    unknown    unknown

 

            T          unknown    73555665   unknown    unknown    unknown

 

            T          unknown    40684348   unknown    unknown    unknown

 

            T          unknown    10257542   unknown    unknown    unknown

 

            T          unknown    12293821   unknown    unknown    unknown

 

            T          unknown    92185236   unknown    unknown    unknown

 

            T          unknown    76352051   unknown    unknown    unknown

 

            T          unknown    94382400   unknown    unknown    unknown

 

            T          unknown    69198846   unknown    unknown    unknown

 

            T          unknown    07393064   unknown    unknown    unknown

 

            T          unknown    25020791   unknown    unknown    unknown

 

            PH_URINE   unknown    86952301   unknown    unknown    unknown

 

            NITRITE_URINE  unknown    72157748   unknown    unknown    unknown

 

            LEUKOCYTE_ESTERASE_URI  unknown    23229375   unknown    unknown    

unknown



           NE                                                     

 

            KETONES_URINE_UA_  unknown    11120683   unknown    unknown    unkno

wn

 

            GLUCOSE_URINE_UA_  unknown    65478985   unknown    unknown    unkno

wn

 

            COLOR_URINE  unknown    31031175   unknown    unknown    unknown

 

            CLARITY_URINE  unknown    94692251   unknown    unknown    unknown

 

            BILIRUBIN_URINE  unknown    94923363   unknown    unknown    unknown

 

            T          unknown    05600118   unknown    unknown    unknown

 

            T          unknown    88753476   unknown    unknown    unknown

 

            T          unknown    43640410   unknown    unknown    unknown

 

            T          unknown    49892845   unknown    unknown    unknown

 

            T          unknown    29691871   unknown    unknown    unknown

 

            NEUTROPHILS_AUTO_  unknown    22317453   unknown    unknown    unkno

wn

 

            T          unknown    70883751   unknown    unknown    unknown

 

            T          unknown    83903272   unknown    unknown    unknown

 

            T          unknown    45315215   unknown    unknown    unknown

 

            MONOCYTES_AUTO_  unknown    54663084   unknown    unknown    unknown

 

            T          unknown    27166981   unknown    unknown    unknown

 

            T          unknown    79796601   unknown    unknown    unknown

 

            T          unknown    48017352   unknown    unknown    unknown

 

            T          unknown    48498527   unknown    unknown    unknown

 

            T          unknown    64722330   unknown    unknown    unknown

 

            LYMPHOCYTES_AUTO_  unknown    89670588   unknown    unknown    unkno

wn

 

            T          unknown    92380441   unknown    unknown    unknown

 

            T          unknown    45267608   unknown    unknown    unknown

 

            T          unknown    51173873   unknown    unknown    unknown

 

            T          unknown    83985165   unknown    unknown    unknown

 

            T          unknown    78780287   unknown    unknown    unknown

 

            T          unknown    92768516   unknown    unknown    unknown

 

            T          unknown    05618767   unknown    unknown    unknown

 

            GFR_-_MDRD  unknown    70286523   unknown    unknown    unknown

 

            T          unknown    24322268   unknown    unknown    unknown

 

            EOSINOPHILS_AUTO_  unknown    63643752   unknown    unknown    unkno

wn

 

            T          unknown    10597306   unknown    unknown    unknown

 

            T          unknown    83216163   unknown    unknown    unknown

 

            T          unknown    90894194   unknown    unknown    unknown

 

            T          unknown    18489708   unknown    unknown    unknown

 

            T          unknown    33856678   unknown    unknown    unknown

 

            T          unknown    54548877   unknown    unknown    unknown

 

            BILIRUBIN_TOTAL  unknown    33784097   unknown    unknown    unknown

 

            BASOPHILS_AUTO_  unknown    76558569   unknown    unknown    unknown

 

            T          unknown    38426779   unknown    unknown    unknown

 

            T          unknown    18982512   unknown    unknown    unknown

 

            T          unknown    80919366   unknown    unknown    unknown

 

            T          unknown    23194518   unknown    unknown    unknown

 

            ALT_ALANINE_AMINOTRANS  unknown    75413575   unknown    unknown    

unknown



           FERASE                                                 

 

            ALKALINE_PHOSPHATASE  unknown    71741819   unknown    unknown    un

known

 

            T          unknown    79174300   unknown    unknown    unknown

 

            T          unknown    54143797   unknown    unknown    unknown

 

            ALBUMIN_GLOBULIN_RATIO  unknown    85574910   unknown    unknown    

unknown

 

            urea_nitrogen_blood  unknown    18851308   unknown    unknown    unk

nown

 

            Erythrocytes_volume_in  unknown    97714311   unknown    unknown    

unknown



           _Blood_by_Automated_cou                                             



           nt                                                     

 

            Erythrocyte_distributi  unknown    52066748   unknown    unknown    

unknown



           on_width_Ratio_by_Autom                                             



           ated_count                                             

 

            MCV_Entitic_volume_by_  unknown    58057097   unknown    unknown    

unknown



           Automated_count                                             

 

            MCH_Entitic_mass_by_Au  unknown    64731257   unknown    unknown    

unknown



           tomated_count                                             

 

            Platelets_volume_in_Bl  unknown    48567703   unknown    unknown    

unknown



           ood_by_Automated_count                                             

 

            Platelet_mean_volume_E  unknown    61281207   unknown    unknown    

unknown



           ntitic_volume_in_Blood_                                             



           by_Rees-Sharon                                             

 

            neutrophil_count_blood  unknown    18965247   unknown    unknown    

unknown

 

            monocyte_count_blood  unknown    44637214   unknown    unknown    un

known

 

            lymphocyte_count_blood  unknown    44265264   unknown    unknown    

unknown

 

            Hemoglobin_Mass_volume  unknown    65492380   unknown    unknown    

unknown



           _in_Blood                                              

 

            eosinophil_count_blood  unknown    98946521   unknown    unknown    

unknown

 

            Basophils_volume_in_Bl  unknown    [id number]   unknown    unknown    

unknown



           ood_by_Manual_count                                             

 

            leukocyte_count_blood  unknown    50753248   unknown    unknown    u

nknown

 

            erythrocyte_RBC_count  unknown    17486138   unknown    unknown    u

nknown

 

            Leukocytes_volume_in_B  unknown    74922114   unknown    unknown    

unknown



           lood_by_Automated_count                                             

 

            Glomerular_Filtration_  unknown    22986981   unknown    unknown    

unknown



           rate                                                   

 

            platelet_count  unknown    76732525   unknown    unknown    unknown

 

            hemoglobin_blood  unknown    68416419   unknown    unknown    unknow

n

 

            hematocrit_blood  unknown    85956877   unknown    unknown    unknow

n

 

            potassium_blood  unknown    65237829   unknown    unknown    unknown

 

            WBC_urine_on_microscop  unknown    10930059   unknown    unknown    

unknown



           y                                                      

 

            Urobilinogen_Presence_  unknown    35185320   unknown    unknown    

unknown



           in_Urine_by_Test_strip                                             

 

            Specific_gravity_of_Ur  unknown    11585335   unknown    unknown    

unknown



           ine_by_Test_strip                                             

 

            Nitrite_Presence_in_Ur  unknown    77656808   unknown    unknown    

unknown



           ine_by_Test_strip                                             

 

            Leukocyte_esterase_Pre  unknown    47236757   unknown    unknown    

unknown



           sence_in_Urine_by_Test_                                             



           strip                                                  

 

            Ketones_Mass_volume_in  unknown    32187298   unknown    unknown    

unknown



           _Urine_by_Test_strip                                             

 

            Color_of_Urine  unknown    95810724   unknown    unknown    unknown

 

            Bilirubin.total_Presen  unknown    24675490   unknown    unknown    

unknown



           ce_in_Urine_by_Test_str                                             



           ip                                                     

 

            clarity_urine_point  unknown    01357015   unknown    unknown    unk

nown

 

            pH_study_of_acidity  unknown    41948975   unknown    unknown    unk

nown

 

           Glomerular_filtration_r  unknown    45163731   unknown    unknown    

unknown



           ate_1.73_sq_M.predicted                                             



           _among_non-blacks_Volum                                             



           e_Rate_Area_in_Serum_Pl                                             



           asma_or_Blood_by_Creati                                             



           nine-based_formula_MDRD                                             



           _                                                      

 

            Hematocrit_Volume_Frac  unknown    22823823   unknown    unknown    

unknown



           tion_of_Blood_by_Automa                                             



           ted_count                                              

 

            bilirubin_serum_total  unknown    82378695   unknown    unknown    u

nknown

 

            alanine_aminotransfera  unknown    20824664   unknown    unknown    

unknown



           se_SGPT_serum                                             

 

            carbon_dioxide_serum_t  unknown    98903683   unknown    unknown    

unknown



           otal                                                   

 

            aspartate_aminotransfe  unknown    25069713   unknown    unknown    

unknown



           rase_SGOT_serum                                             

 

            protein_total_serum  unknown    20749068   unknown    unknown    unk

nown

 

            blood_glucose  unknown    65782596   unknown    unknown    unknown

 

            potassium_blood  unknown    74041718   unknown    unknown    unknown

 

            glucose_urine  unknown    18449802   unknown    unknown    unknown

 

            leukocyte_esterase_uri  unknown    59187561   unknown    unknown    

unknown



           ne_by_dipstick                                             

 

            urobilinogen_urine_sem  unknown    02186063   unknown    unknown    

unknown



           iquantitative_dipstick_                                             

 

            specific_gravity_urine  unknown    28931896   unknown    unknown    

unknown

 

            nitrite_urine_semiquan  unknown    42122582   unknown    unknown    

unknown



           titative                                               

 

            ketones_urine_by_test_  unknown    77303563   unknown    unknown    

unknown



           strip                                                  

 

            bilirubin_urine  unknown    14702372   unknown    unknown    unknown

 

            mean_corpuscular_volum  unknown    32857672   unknown    unknown    

unknown



           e_RBC                                                  

 

            Urea_nitrogen_Mass_vol  unknown    50007510   unknown    unknown    

unknown



           ume_in_Serum_or_Plasma                                             

 

            globulin_serum  unknown    09306550   unknown    unknown    unknown

 

            alkaline_phosphatase_s  unknown    79762650   unknown    unknown    

unknown



           vanessa                                                   

 

            Sodium_Moles_volume_in  unknown    21597394   unknown    unknown    

unknown



           _Serum_or_Plasma                                             

 

            Protein_Mass_volume_in  unknown    67982931   unknown    unknown    

unknown



           _Serum_or_Plasma                                             

 

            eosinophil_count_blood  unknown    57234899   unknown    unknown    

unknown

 

            anion_gap_serum  unknown    23214567   unknown    unknown    unknown

 

            mean_platelet_volume  unknown    48950919   unknown    unknown    un

known

 

            urine_color  unknown    51787688   unknown    unknown    unknown

 

            basophil_count_blood  unknown    71770022   unknown    unknown    un

known

 

            monocyte_count_blood  unknown    60345888   unknown    unknown    un

known

 

            lymphocyte_count_blood  unknown    32816507   unknown    unknown    

unknown

 

            neutrophil_count_blood  unknown    53263587   unknown    unknown    

unknown

 

            Glucose_Mass_volume_in  unknown    78284784   unknown    unknown    

unknown



           _Urine                                                 

 

            Glucose_Mass_volume_in  unknown    35057751   unknown    unknown    

unknown



           _Serum_or_Plasma                                             

 

            Globulin_Mass_volume_i  unknown    00657161   unknown    unknown    

unknown



           n_Serum                                                

 

            Creatinine_Mass_volume  unknown    06739329   unknown    unknown    

unknown



           _in_Serum_or_Plasma                                             

 

            Chloride_Moles_volume_  unknown    14555902   unknown    unknown    

unknown



           in_Serum_or_Plasma                                             

 

            carbon_dioxide_serum_t  unknown    71161105   unknown    unknown    

unknown



           otal                                                   

 

            Calcium_Moles_volume_i  unknown    70576524   unknown    unknown    

unknown



           n_Serum_or_Plasma                                             

 

            albumin_serum  unknown    69892643   unknown    unknown    unknown

 

            Bilirubin.total_Mass_v  unknown    79947053   unknown    unknown    

unknown



           olume_in_Serum_or_Plasm                                             



           a                                                      

 

            Aspartate_aminotransfe  unknown    58475211   unknown    unknown    

unknown



           rase_Enzymatic_activity                                             



           _volume_in_Serum_or_Pla                                             



           sma                                                    

 

            Anion_gap_4_in_Serum_o  unknown    24076214   unknown    unknown    

unknown



           r_Plasma                                               

 

            creatinine_serum  unknown    30593095   unknown    unknown    unknow

n

 

            Alkaline_phosphatase_E  unknown    18694125   unknown    unknown    

unknown



           nzymatic_activity_volum                                             



           e_in_Blood                                             

 

            Albumin_Globulin_Mass_  unknown    38950796   unknown    unknown    

unknown



           Ratio_in_Serum_or_Plasm                                             



           a                                                      

 

            Albumin_Mass_volume_in  unknown    16178060   unknown    unknown    

unknown



           _Serum_or_Plasma                                             

 

            Alanine_aminotransfera  unknown    07849259   unknown    unknown    

unknown



           se_Enzymatic_activity_v                                             



           olume_in_Serum_or_Plasm                                             



           a                                                      

 

            mean_corpuscular_hemog  unknown    48360813   unknown    unknown    

unknown



           lobin_concentration_rbc                                             

 

            sodium_serum  unknown    17448197   unknown    unknown    unknown

 

            albumin_globulin_ratio  unknown    51558700   unknown    unknown    

unknown



           _serum                                                 

 

            chloride_serum  unknown    96540296   unknown    unknown    unknown

 

            calcium_serum  unknown    21679220   unknown    unknown    unknown

 

            mean_corpuscular_hemog  unknown    31707658   unknown    unknown    

unknown



           lobin_RBC                                              

 

            red_blood_cell_distrib  unknown    76778107   unknown    unknown    

unknown



           ution_width                                             

 

            WBC_urine_on_microscop  unknown    93512602   unknown    unknown    

unknown



           y                                                      

 

            T          unknown    53979379   unknown    unknown    unknown

 

            WBC_URINE  unknown    97801120   unknown    unknown    unknown

 

            UROBILINOGEN_URINE  unknown    82584250   unknown    unknown    unkn

own

 

            SPECIFIC_GRAVITY_URINE  unknown    55216316   unknown    unknown    

unknown

 

            T          unknown    98317451   unknown    unknown    unknown

 

            T          unknown    59707841   unknown    unknown    unknown

 

            T          unknown    48400054   unknown    unknown    unknown

 

            T          unknown    65376516   unknown    unknown    unknown

 

            T          unknown    73473712   unknown    unknown    unknown

 

            T          unknown    71622163   unknown    unknown    unknown

 

            T          unknown    34895639   unknown    unknown    unknown

 

            T          unknown    39360359   unknown    unknown    unknown

 

            T          unknown    92044437   unknown    unknown    unknown

 

            T          unknown    71010172   unknown    unknown    unknown

 

            T          unknown    68681819   unknown    unknown    unknown

 

            PH_URINE   unknown    93029052   unknown    unknown    unknown

 

            NITRITE_URINE  unknown    24446276   unknown    unknown    unknown

 

            LEUKOCYTE_ESTERASE_URI  unknown    38579664   unknown    unknown    

unknown



           NE                                                     

 

            KETONES_URINE_UA_  unknown    97020243   unknown    unknown    unkno

wn

 

            GLUCOSE_URINE_UA_  unknown    85945961   unknown    unknown    unkno

wn

 

            COLOR_URINE  unknown    92818009   unknown    unknown    unknown

 

            CLARITY_URINE  unknown    30566444   unknown    unknown    unknown

 

            BILIRUBIN_URINE  unknown    99001343   unknown    unknown    unknown

 

            T          unknown    98238336   unknown    unknown    unknown

 

            T          unknown    00925085   unknown    unknown    unknown

 

            T          unknown    57459622   unknown    unknown    unknown

 

            T          unknown    83858213   unknown    unknown    unknown

 

            T          unknown    11430985   unknown    unknown    unknown

 

            NEUTROPHILS_AUTO_  unknown    55806184   unknown    unknown    unkno

wn

 

            T          unknown    23297736   unknown    unknown    unknown

 

            T          unknown    23917224   unknown    unknown    unknown

 

            T          unknown    25404122   unknown    unknown    unknown

 

            MONOCYTES_AUTO_  unknown    49790548   unknown    unknown    unknown

 

            T          unknown    28254031   unknown    unknown    unknown

 

            T          unknown    63581709   unknown    unknown    unknown

 

            T          unknown    89846722   unknown    unknown    unknown

 

            T          unknown    50342077   unknown    unknown    unknown

 

            LYMPHOCYTES_AUTO_  unknown    59428243   unknown    unknown    unkno

wn

 

            T          unknown    39659889   unknown    unknown    unknown

 

            T          unknown    48081691   unknown    unknown    unknown

 

            T          unknown    07537230   unknown    unknown    unknown

 

            T          unknown    77155906   unknown    unknown    unknown

 

            T          unknown    26437515   unknown    unknown    unknown

 

            T          unknown    37326465   unknown    unknown    unknown

 

            T          unknown    23148435   unknown    unknown    unknown

 

            GFR_-_MDRD  unknown    72472775   unknown    unknown    unknown

 

            T          unknown    83169213   unknown    unknown    unknown

 

            EOSINOPHILS_AUTO_  unknown    33146573   unknown    unknown    unkno

wn

 

            T          unknown    17042212   unknown    unknown    unknown

 

            T          unknown    37844941   unknown    unknown    unknown

 

            T          unknown    94789908   unknown    unknown    unknown

 

            T          unknown    33527588   unknown    unknown    unknown

 

            T          unknown    2022   unknown    unknown    unknown

 

            T          unknown    2022   unknown    unknown    unknown

 

            BILIRUBIN_TOTAL  unknown    2022   unknown    unknown    unknown

 

            BASOPHILS_AUTO_  unknown    2022   unknown    unknown    unknown

 

            T          unknown    2022   unknown    unknown    unknown

 

            T          unknown    2022   unknown    unknown    unknown

 

            T          unknown    2022   unknown    unknown    unknown

 

            T          unknown    2022   unknown    unknown    unknown

 

            ALT_ALANINE_AMINOTRANS  unknown    2022   unknown    unknown    

unknown



           FERASE                                                 

 

            ALKALINE_PHOSPHATASE  unknown    2022   unknown    unknown    un

known

 

            T          unknown    2022   unknown    unknown    unknown

 

            T          unknown    2022   unknown    unknown    unknown

 

            ALBUMIN_GLOBULIN_RATIO  unknown    2022   unknown    unknown    

unknown









         facility  observation  status  value   reference  units   lab code  abn

ormal  

line



                                        range                           notes

 

         All     urea_nitroge  unknown  17      unknown  mg/dL   _9      unknown

  unknown



                n_blood                                                 

 

         All     Erythrocytes  unknown  3.33 10  unknown          _789-8  unknow

n  unknown



                _volume_in_Bl         6/UL                                    



                ood_by_Automa                                                 



                ted_count                                                 

 

         All     Erythrocyte_  unknown  15.0    unknown  %       _788-0  unknown

  unknown



                distribution_                                                 



                width_Ratio_b                                                 



                y_Automated_c                                                 



                ount                                                    

 

         All     MCV_Entitic_  unknown  96.4    unknown  fL      _787-2  unknown

  unknown



                volume_by_Aut                                                 



                omated_count                                                 

 

         All     MCH_Entitic_  unknown  30.9    unknown  pg      _785-6  unknown

  unknown



                mass_by_Autom                                                 



                ated_count                                                 

 

         All     Platelets_vo  unknown  163 10  unknown          _777-3  unknown

  unknown



                lume_in_Blood         3/UL                                    



                _by_Automated                                                 



                _count                                                  

 

         All     Platelet_mea  unknown  8.6     unknown  fL      _776-5  unknown

  unknown



                n_volume_Enti                                                 



                tic_volume_in                                                 



                _Blood_by_Ree                                                 



                s-Sharon                                                 

 

         All     neutrophil_c  unknown  3.7 10  unknown          _752-6  unknown

  unknown



                ount_blood         3/UL                                    

 

         All     monocyte_cou  unknown  0.5 10  unknown          _743-5  unknown

  unknown



                nt_blood         3/UL                                    

 

         All     lymphocyte_c  unknown  1.2 10  unknown          _732-8  unknown

  unknown



                ount_blood         3/UL                                    

 

         All     Hemoglobin_M  unknown  10.3    unknown  g/dL    _718-7  unknown

  unknown



                ass_volume_in                                                 



                _Blood                                                  

 

         All     eosinophil_c  unknown  0.1 10  unknown          _712-0  unknown

  unknown



                ount_blood         3/UL                                    

 

         All     Basophils_vo  unknown  0.0 10  unknown          _705-4  unknown

  unknown



                lume_in_Blood         3/UL                                    



                _by_Manual_co                                                 



                unt                                                     

 

         All     leukocyte_co  unknown  5.6 X10  unknown          _68     unknow

n  unknown



                unt_blood         3/UL                                    

 

         All     erythrocyte_  unknown  3.33 10  unknown          _67     unknow

n  unknown



                RBC_count         6/UL                                    

 

         All     Leukocytes_v  unknown  5.6 X10  unknown          _6690-2  unkno

wn  unknown



                olume_in_Bloo         3/UL                                    



                d_by_Automate                                                 



                d_count                                                 

 

         All     Glomerular_F  unknown  70      unknown  mL/mi   _66455  unknown

  unknown



                iltration_rat                         n                       



                e                                                       

 

         All     platelet_cou  unknown  163 10  unknown          _66     unknown

  unknown



                nt              3/UL                                    

 

         All     hemoglobin_b  unknown  10.3    unknown  g/dL    _65     unknown

  unknown



                lood                                                    

 

         All     hematocrit_b  unknown  32.1    unknown  %       _64     unknown

  unknown



                lood                                                    

 

         All     potassium_bl  unknown  4.1     unknown  meq/L   _6298-4  unknow

n  unknown



                ood                                                     

 

         All     WBC_urine_on  unknown  11-25   unknown          _5821-4  unknow

n  unknown



                _microscopy         /HPF                                    

 

         All     Urobilinogen  unknown  0.2     unknown          _5818-0  unknow

n  unknown



                _Presence_in_         (NORMAL)                                 



                Urine_by_Test                                                 



                _strip                                                  

 

         All     Specific_gra  unknown  1.020   unknown          _5811-5  unknow

n  unknown



                vity_of_Urine                                                 



                _by_Test_stri                                                 



                p                                                       

 

         All     Nitrite_Pres  unknown  NEGATIVE  unknown          _5802-4  unkn

own  unknown



                ence_in_Urine                                                 



                _by_Test_stri                                                 



                p                                                       

 

         All     Leukocyte_es  unknown  MODERATE  unknown          _5799-2  unkn

own  unknown



                terase_Presen                                                 



                ce_in_Urine_b                                                 



                y_Test_strip                                                 

 

         All     Ketones_Mass  unknown  NEGATIVE  unknown          _5797-6  unkn

own  unknown



                _volume_in_Ur                                                 



                ine_by_Test_s                                                 



                trip                                                    

 

         All     Color_of_Uri  unknown  DARK    unknown          _5778-6  unknow

n  unknown



                ne              YELLOW                                  

 

         All     Bilirubin.to  unknown  NEGATIVE  unknown          _5770-3  unkn

own  unknown



                tal_Presence_                                                 



                in_Urine_by_T                                                 



                est_strip                                                 

 

         All     clarity_urin  unknown  HAZY    unknown          _5589   unknown

  unknown



                e_point                                                 

 

         All     pH_study_of_  unknown  6.0     unknown          _51641  unknown

  unknown



                acidity                                                 

 

         All    Glomerular_fi  unknown  70      unknown  mL/mi   _48642-3  unkno

wn  unknown



                ltration_rate                         n                       



                _1.73_sq_M.pr                                                 



                edicted_among                                                 



                _non-blacks_V                                                 



                olume_Rate_Ar                                                 



                ea_in_Serum_P                                                 



                lasma_or_Bloo                                                 



                d_by_Creatini                                                 



                ne-based_form                                                 



                ula_MDRD_                                                 

 

         All     Hematocrit_V  unknown  32.1    unknown  %       _4544-3  unknow

n  unknown



                olume_Fractio                                                 



                n_of_Blood_by                                                 



                _Automated_co                                                 



                unt                                                     

 

         All     bilirubin_se  unknown  0.7     unknown  mg/dL   _43     unknown

  unknown



                rum_total                                                 

 

         All     alanine_amin  unknown  < 10 IU/L  unknown          _40     unkn

own  unknown



                otransferase_                                                 



                SGPT_serum                                                 

 

         All     carbon_dioxi  unknown  25      unknown  mmol/   _3962   unknown

  unknown



                de_serum_tota                         L                       



                l                                                       

 

         All     aspartate_am  unknown  15      unknown  U/L     _39     unknown

  unknown



                inotransferas                                                 



                e_SGOT_serum                                                 

 

         All     protein_tota  unknown  5.4     unknown  g/dL    _36     unknown

  unknown



                l_serum                                                 

 

         All     blood_glucos  unknown  113     unknown  mg/dL   _3565   unknown

  unknown



                e                                                       

 

         All     potassium_bl  unknown  4.1     unknown  meq/L   _3483   unknown

  unknown



                ood                                                     

 

         All     glucose_urin  unknown  NEGATIVE  unknown          _3369   unkno

wn  unknown



                e               mg/dL                                   

 

         All     leukocyte_es  unknown  MODERATE  unknown          _327    unkno

wn  unknown



                terase_urine_                                                 



                by_dipstick                                                 

 

         All     urobilinogen  unknown  0.2     unknown          _326    unknown

  unknown



                _urine_semiqu         (NORMAL)                                 



                antitative_di                                                 



                pstick_                                                 

 

         All     specific_gra  unknown  1.020   unknown          _325    unknown

  unknown



                vity_urine                                                 

 

         All     nitrite_urin  unknown  NEGATIVE  unknown          _323    unkno

wn  unknown



                e_semiquantit                                                 



                ative                                                   

 

         All     ketones_urin  unknown  NEGATIVE  unknown          _322    unkno

wn  unknown



                e_by_test_str                                                 



                ip                                                      

 

         All     magnesium_se  unknown  1.9     unknown  mg/dL   _32     unknown

  unknown



                rum                                                     

 

         All     bilirubin_ur  unknown  NEGATIVE  unknown          _319    unkno

wn  unknown



                ine                                                     

 

         All     mean_corpusc  unknown  96.4    unknown  fL      _315    unknown

  unknown



                ular_volume_R                                                 



                BC                                                      

 

         All     Urea_nitroge  unknown  17      unknown  mg/dL   _3094-0  unknow

n  unknown



                n_Mass_volume                                                 



                _in_Serum_or_                                                 



                Plasma                                                  

 

         All     globulin_ser  unknown  2.0     unknown          _3059   unknown

  unknown



                um                                                      

 

         All     alkaline_pho  unknown  87      unknown  U/L     _3      unknown

  unknown



                sphatase_seru                                                 



                m                                                       

 

         All     Sodium_Moles  unknown  138     unknown  mmol/   _2951-2  unknow

n  unknown



                _volume_in_Se                         L                       



                rum_or_Plasma                                                 

 

         All     Protein_Mass  unknown  5.4     unknown  g/dL    _2885-2  unknow

n  unknown



                _volume_in_Se                                                 



                rum_or_Plasma                                                 

 

         All     eosinophil_c  unknown  0.1 10  unknown          _285    unknown

  unknown



                ount_blood         3/UL                                    

 

         All     anion_gap_se  unknown  8.0     unknown          _279    unknown

  unknown



                rum                                                     

 

         All     mean_platele  unknown  8.6     unknown  fL      _2784   unknown

  unknown



                t_volume                                                 

 

         All     urine_color  unknown  DARK    unknown          _2751   unknown 

 unknown



                                YELLOW                                  

 

         All     Magnesium_Mo  unknown  1.9     unknown  mg/dL   _2601-3  unknow

n  unknown



                les_volume_in                                                 



                _Serum_or_Pla                                                 



                sma                                                     

 

         All     basophil_cou  unknown  0.0 10  unknown          _2427   unknown

  unknown



                nt_blood         3/UL                                    

 

         All     monocyte_cou  unknown  0.5 10  unknown          _2422   unknown

  unknown



                nt_blood         3/UL                                    

 

         All     lymphocyte_c  unknown  1.2 10  unknown          _2420   unknown

  unknown



                ount_blood         3/UL                                    

 

         All     neutrophil_c  unknown  3.7 10  unknown          _2418   unknown

  unknown



                ount_blood         3/UL                                    

 

         All     Glucose_Mass  unknown  NEGATIVE  unknown          _2350-7  unkn

own  unknown



                _volume_in_Ur         mg/dL                                   



                ine                                                     

 

         All     Glucose_Mass  unknown  113     unknown  mg/dL   _2345-7  unknow

n  unknown



                _volume_in_Se                                                 



                rum_or_Plasma                                                 

 

         All     Globulin_Mas  unknown  2.0     unknown          _2336-6  unknow

n  unknown



                s_volume_in_S                                                 



                vanessa                                                    

 

         All     Creatinine_M  unknown  0.8     unknown  mg/dL   _2160-0  unknow

n  unknown



                ass_volume_in                                                 



                _Serum_or_Pla                                                 



                sma                                                     

 

         All     Chloride_Mol  unknown  105     unknown  mmol/   _2075-0  unknow

n  unknown



                es_volume_in_                         L                       



                Serum_or_Plas                                                 



                ma                                                      

 

         All     carbon_dioxi  unknown  25      unknown  mmol/   _2028-9  unknow

n  unknown



                de_serum_tota                         L                       



                l                                                       

 

         All     Calcium_Mole  unknown  8.0     unknown  mg/dL   _-8  unknow

n  unknown



                s_volume_in_S                                                 



                erum_or_Plasm                                                 



                a                                                       

 

         All     albumin_seru  unknown  3.4     unknown  g/dL    _2      unknown

  unknown



                m                                                       

 

         All     Bilirubin.to  unknown  0.7     unknown  mg/dL   _-  unknow

n  unknown



                tal_Mass_volu                                                 



                me_in_Serum_o                                                 



                r_Plasma                                                 

 

         All     Aspartate_am  unknown  15      unknown  U/L     _1920-8  unknow

n  unknown



                inotransferas                                                 



                e_Enzymatic_a                                                 



                ctivity_volum                                                 



                e_in_Serum_or                                                 



                _Plasma                                                 

 

         All     Anion_gap_4_  unknown  8.0     unknown          _1863-0  unknow

n  unknown



                in_Serum_or_P                                                 



                lasma                                                   

 

         All     creatinine_s  unknown  0.8     unknown  mg/dL   _18     unknown

  unknown



                vanessa                                                    

 

         All     Alkaline_pho  unknown  87      unknown  U/L     _1783-0  unknow

n  unknown



                sphatase_Enzy                                                 



                matic_activit                                                 



                y_volume_in_B                                                 



                lood                                                    

 

         All     Albumin_Glob  unknown  1.7     unknown          _1759-0  unknow

n  unknown



                ulin_Mass_Rat                                                 



                io_in_Serum_o                                                 



                r_Plasma                                                 

 

         All     Albumin_Mass  unknown  3.4     unknown  g/dL    _1751-7  unknow

n  unknown



                _volume_in_Se                                                 



                rum_or_Plasma                                                 

 

         All     Alanine_amin  unknown  < 10 IU/L  unknown          _1742-6  unk

nown  unknown



                otransferase_                                                 



                Enzymatic_act                                                 



                ivity_volume_                                                 



                in_Serum_or_P                                                 



                lasma                                                   

 

         All     mean_corpusc  unknown  32.1    unknown  g/dL    _17029  unknown

  unknown



                ular_hemoglob                                                 



                in_concentrat                                                 



                ion_rbc                                                 

 

         All     sodium_serum  unknown  138     unknown  mmol/   _159    unknown

  unknown



                                                L                       

 

         All     albumin_glob  unknown  1.7     unknown          _146    unknown

  unknown



                ulin_ratio_se                                                 



                rum                                                     

 

         All     chloride_ser  unknown  105     unknown  mmol/   _13     unknown

  unknown



                um                              L                       

 

         All     calcium_seru  unknown  8.0     unknown  mg/dL   _11     unknown

  unknown



                m                                                       

 

         All     mean_corpusc  unknown  30.9    unknown  pg      _1031   unknown

  unknown



                ular_hemoglob                                                 



                in_RBC                                                  

 

         All     red_blood_ce  unknown  15.0    unknown  %       _1030   unknown

  unknown



                ll_distributi                                                 



                on_width                                                 

 

         All     WBC_urine_on  unknown  11-25   unknown          _1016   unknown

  unknown



                _microscopy         /HPF                                    

 

         All     T       unknown  5.6 X10  unknown          WBC     unknown  unk

nown



                                3/UL                                    

 

         All     WBC_URINE  unknown  11-25   unknown          UWBC    unknown  u

nknown



                                /HPF                                    

 

         All     UROBILINOGEN  unknown  0.2     unknown          UUROBIL  unknow

n  unknown



                _URINE          (NORMAL)                                 

 

         All     SPECIFIC_GRA  unknown  1.020   unknown          USG     unknown

  unknown



                VITY_URINE                                                 

 

         All     T       unknown  11-25   unknown          UR_WBC  unknown  unkn

own



                                /HPF                                    

 

         All     T       unknown  0.2     unknown          UR_URO  unknown  unkn

own



                                (NORMAL)                                 

 

         All     T       unknown  1.020   unknown          UR_SG   unknown  unkn

own

 

         All     T       unknown  6.0     unknown          UR_PH   unknown  unkn

own

 

         All     T       unknown  NEGATIVE  unknown          UR_NIT  unknown  un

known

 

         All     T       unknown  MODERATE  unknown          UR_LEU_E  unknown  

unknown



                                                        STERASE         

 

         All     T       unknown  NEGATIVE  unknown          UR_KETO_  unknown  

unknown



                                                        UA_             

 

         All     T       unknown  NEGATIVE  unknown          UR_GLU  unknown  un

known



                                mg/dL                                   

 

         All     T       unknown  DARK    unknown          UR_COLOR  unknown  un

known



                                YELLOW                                  

 

         All     T       unknown  HAZY    unknown          UR_CLARI  unknown  un

known



                                                        TY              

 

         All     T       unknown  NEGATIVE  unknown          UR_BILI  unknown  u

nknown

 

         All     PH_URINE  unknown  6.0     unknown          UPH     unknown  un

known

 

         All     NITRITE_URIN  unknown  NEGATIVE  unknown          UNITRITE  unk

nown  unknown



                E                                                       

 

         All     LEUKOCYTE_ES  unknown  MODERATE  unknown          ULEUK   unkno

wn  unknown



                TERASE_URINE                                                 

 

         All     KETONES_URIN  unknown  NEGATIVE  unknown          UKET    unkno

wn  unknown



                E_UA_                                                   

 

         All     GLUCOSE_URIN  unknown  NEGATIVE  unknown          UGLUC   unkno

wn  unknown



                E_UA_           mg/dL                                   

 

         All     COLOR_URINE  unknown  DARK    unknown          UCOL    unknown 

 unknown



                                YELLOW                                  

 

         All     CLARITY_URIN  unknown  HAZY    unknown          UCLAR   unknown

  unknown



                E                                                       

 

         All     BILIRUBIN_UR  unknown  NEGATIVE  unknown          UBIL    unkno

wn  unknown



                INE                                                     

 

         All     T       unknown  0.7     unknown  mg/dL   TOTAL_BI  unknown  un

known



                                                        LI              

 

         All     T       unknown  15.0    unknown  %       RDW     unknown  unkn

own

 

         All     T       unknown  3.33 10  unknown          RBC     unknown  unk

nown



                                6/UL                                    

 

         All     T       unknown  5.4     unknown  g/dL    PRO_TOTA  unknown  un

known



                                                        L               

 

         All     T       unknown  163 10  unknown          PLT     unknown  unkn

own



                                3/UL                                    

 

         All     NEUTROPHILS_  unknown  3.7 10  unknown          NE_     unknown

  unknown



                AUTO_           3/UL                                    

 

         All     T       unknown  3.7 10  unknown          NEUT_AUT  unknown  un

known



                                3/UL                    O_              

 

         All     T       unknown  138     unknown  mmol/   NA      unknown  unkn

own



                                                L                       

 

         All     T       unknown  8.6     unknown  fL      MPV     unknown  unkn

own

 

         All     MONOCYTES_AU  unknown  0.5 10  unknown          MO_     unknown

  unknown



                TO_             3/UL                                    

 

         All     T       unknown  0.5 10  unknown          MONO_AUT  unknown  un

known



                                3/UL                    O_              

 

         All     T       unknown  1.9     unknown  mg/dL   MG      unknown  unkn

own

 

         All     T       unknown  96.4    unknown  fL      MCV     unknown  unkn

own

 

         All     T       unknown  32.1    unknown  g/dL    MCHC    unknown  unkn

own

 

         All     T       unknown  30.9    unknown  pg      MCH     unknown  unkn

own

 

         All     LYMPHOCYTES_  unknown  1.2 10  unknown          LY_     unknown

  unknown



                AUTO_           3/UL                                    

 

         All     T       unknown  1.2 10  unknown          LYMPH_AU  unknown  un

known



                                3/UL                    TO_             

 

         All     T       unknown  4.1     unknown  meq/L   K       unknown  unkn

own

 

         All     T       unknown  10.3    unknown  g/dL    HGB     unknown  unkn

own

 

         All     T       unknown  32.1    unknown  %       HCT     unknown  unkn

own

 

         All     T       unknown  113     unknown  mg/dL   GLU     unknown  unkn

own

 

         All     T       unknown  2.0     unknown          GLOB    unknown  unkn

own

 

         All     T       unknown  70      unknown  mL/mi   GFR_-_MD  unknown  un

known



                                                n       RD              

 

         All     GFR_-_MDRD  unknown  70      unknown  mL/mi   GFR     unknown  

unknown



                                                n                       

 

         All     T       unknown  8.0     unknown          GAP     unknown  unkn

own

 

         All     EOSINOPHILS_  unknown  0.1 10  unknown          EO_     unknown

  unknown



                AUTO_           3/UL                                    

 

         All     T       unknown  0.1 10  unknown          EOS_AUTO  unknown  un

known



                                3/UL                    _               

 

         All     T       unknown  0.8     unknown  mg/dL   CREAT   unknown  unkn

own

 

         All     T       unknown  25      unknown  mmol/   CO2     unknown  unkn

own



                                                L                       

 

         All     T       unknown  105     unknown  mmol/   CL      unknown  unkn

own



                                                L                       

 

         All     T       unknown  8.0     unknown  mg/dL   CA      unknown  unkn

own

 

         All     T       unknown  17      unknown  mg/dL   BUN     unknown  unkn

own

 

         All     BILIRUBIN_TO  unknown  0.7     unknown  mg/dL   BILIT   unknown

  unknown



                CONNIE                                                     

 

         All     BASOPHILS_AU  unknown  0.0 10  unknown          BA_     unknown

  unknown



                TO_             3/UL                                    

 

         All     T       unknown  0.0 10  unknown          BASO_AUT  unknown  un

known



                                3/UL                    O_              

 

         All     T       unknown  1.7     unknown          A_G_RATI  unknown  un

known



                                                        O               

 

         All     T       unknown  15      unknown  U/L     AST     unknown  unkn

own

 

         All     T       unknown  < 10 IU/L  unknown          ALT_SGPT  unknown 

 unknown



                                                        _               

 

         All     ALT_ALANINE_  unknown  < 10 IU/L  unknown          ALT     unkn

own  unknown



                AMINOTRANSFER                                                 



                ASE                                                     

 

         All     ALKALINE_PHO  unknown  87      unknown  U/L     ALP     unknown

  unknown



                SPHATASE                                                 

 

         All     T       unknown  87      unknown  U/L     ALK_PHOS  unknown  un

known

 

         All     T       unknown  3.4     unknown  g/dL    ALB     unknown  unkn

own

 

         All     ALBUMIN_GLOB  unknown  1.7     unknown          AGRATIO  unknow

n  unknown



                ULIN_RATIO                                                 

 

         All     urea_nitroge  unknown  18      unknown  mg/dL   _9      unknown

  unknown



                n_blood                                                 

 

         All     Erythrocytes  unknown  3.53 10  unknown          _789-8  unknow

n  unknown



                _volume_in_Bl         6/UL                                    



                ood_by_Automa                                                 



                ted_count                                                 

 

         All     Erythrocyte_  unknown  14.0    unknown  %       _788-0  unknown

  unknown



                distribution_                                                 



                width_Ratio_b                                                 



                y_Automated_c                                                 



                ount                                                    

 

         All     MCV_Entitic_  unknown  94.9    unknown  fL      _787-2  unknown

  unknown



                volume_by_Aut                                                 



                omated_count                                                 

 

         All     MCH_Entitic_  unknown  30.9    unknown  pg      _785-6  unknown

  unknown



                mass_by_Autom                                                 



                ated_count                                                 

 

         All     Platelets_vo  unknown  209 10  unknown          _777-3  unknown

  unknown



                lume_in_Blood         3/UL                                    



                _by_Automated                                                 



                _count                                                  

 

         All     Platelet_mea  unknown  8.6     unknown  fL      _776-5  unknown

  unknown



                n_volume_Enti                                                 



                tic_volume_in                                                 



                _Blood_by_Ree                                                 



                s-Sharon                                                 

 

         All     neutrophil_c  unknown  4.1 10  unknown          _752-6  unknown

  unknown



                ount_blood         3/UL                                    

 

         All     monocyte_cou  unknown  0.5 10  unknown          _743-5  unknown

  unknown



                nt_blood         3/UL                                    

 

         All     lymphocyte_c  unknown  0.8 10  unknown          _732-8  unknown

  unknown



                ount_blood         3/UL                                    

 

         All     Hemoglobin_M  unknown  10.9    unknown  g/dL    _718-7  unknown

  unknown



                ass_volume_in                                                 



                _Blood                                                  

 

         All     eosinophil_c  unknown  0.3 10  unknown          _712-0  unknown

  unknown



                ount_blood         3/UL                                    

 

         All     Basophils_vo  unknown  0.1 10  unknown          _705-4  unknown

  unknown



                lume_in_Blood         3/UL                                    



                _by_Manual_co                                                 



                unt                                                     

 

         All     leukocyte_co  unknown  5.7 X10  unknown          _68     unknow

n  unknown



                unt_blood         3/UL                                    

 

         All     erythrocyte_  unknown  3.53 10  unknown          _67     unknow

n  unknown



                RBC_count         6/UL                                    

 

         All     Leukocytes_v  unknown  5.7 X10  unknown          _6690-2  unkno

wn  unknown



                olume_in_Bloo         3/UL                                    



                d_by_Automate                                                 



                d_count                                                 

 

         All     Glomerular_F  unknown  70      unknown  mL/mi   _66455  unknown

  unknown



                iltration_rat                         n                       



                e                                                       

 

         All     platelet_cou  unknown  209 10  unknown          _66     unknown

  unknown



                nt              3/UL                                    

 

         All     hemoglobin_b  unknown  10.9    unknown  g/dL    _65     unknown

  unknown



                lood                                                    

 

         All     hematocrit_b  unknown  33.5    unknown  %       _64     unknown

  unknown



                lood                                                    

 

         All     potassium_bl  unknown  4.5     unknown  meq/L   _6298-4  unknow

n  unknown



                ood                                                     

 

         All     WBC_urine_on  unknown  6-10 /HPF  unknown          _5821-4  unk

nown  unknown



                _microscopy                                                 

 

         All     Urobilinogen  unknown  1       unknown          _5818-0  unknow

n  unknown



                _Presence_in_         (NORMAL)                                 



                Urine_by_Test                                                 



                _strip                                                  

 

         All     Specific_gra  unknown  1.020   unknown          _5811-5  unknow

n  unknown



                vity_of_Urine                                                 



                _by_Test_stri                                                 



                p                                                       

 

         All     Nitrite_Pres  unknown  NEGATIVE  unknown          _5802-4  unkn

own  unknown



                ence_in_Urine                                                 



                _by_Test_stri                                                 



                p                                                       

 

         All     Leukocyte_es  unknown  SMALL   unknown          _5799-2  unknow

n  unknown



                terase_Presen                                                 



                ce_in_Urine_b                                                 



                y_Test_strip                                                 

 

         All     Ketones_Mass  unknown  NEGATIVE  unknown          _5797-6  unkn

own  unknown



                _volume_in_Ur                                                 



                ine_by_Test_s                                                 



                trip                                                    

 

         All     Color_of_Uri  unknown  YELLOW  unknown          _5778-6  unknow

n  unknown



                ne                                                      

 

         All     Bilirubin.to  unknown  NEGATIVE  unknown          _5770-3  unkn

own  unknown



                tal_Presence_                                                 



                in_Urine_by_T                                                 



                est_strip                                                 

 

         All     clarity_urin  unknown  CLEAR   unknown          _5589   unknown

  unknown



                e_point                                                 

 

         All     pH_study_of_  unknown  7.0     unknown          _51641  unknown

  unknown



                acidity                                                 

 

         All    Glomerular_fi  unknown  70      unknown  mL/mi   _48642-3  unkno

wn  unknown



                ltration_rate                         n                       



                _1.73_sq_M.pr                                                 



                edicted_among                                                 



                _non-blacks_V                                                 



                olume_Rate_Ar                                                 



                ea_in_Serum_P                                                 



                lasma_or_Bloo                                                 



                d_by_Creatini                                                 



                ne-based_form                                                 



                ula_MDRD_                                                 

 

         All     Hematocrit_V  unknown  33.5    unknown  %       _4544-3  unknow

n  unknown



                olume_Fractio                                                 



                n_of_Blood_by                                                 



                _Automated_co                                                 



                unt                                                     

 

         All     bilirubin_se  unknown  0.6     unknown  mg/dL   _43     unknown

  unknown



                rum_total                                                 

 

         All     alanine_amin  unknown  < 10 IU/L  unknown          _40     unkn

own  unknown



                otransferase_                                                 



                SGPT_serum                                                 

 

         All     carbon_dioxi  unknown  28      unknown  mmol/   _3962   unknown

  unknown



                de_serum_tota                         L                       



                l                                                       

 

         All     aspartate_am  unknown  12      unknown  U/L     _39     unknown

  unknown



                inotransferas                                                 



                e_SGOT_serum                                                 

 

         All     protein_tota  unknown  5.8     unknown  g/dL    _36     unknown

  unknown



                l_serum                                                 

 

         All     blood_glucos  unknown  129     unknown  mg/dL   _3565   unknown

  unknown



                e                                                       

 

         All     potassium_bl  unknown  4.5     unknown  meq/L   _3483   unknown

  unknown



                ood                                                     

 

         All     glucose_urin  unknown  NEGATIVE  unknown          _3369   unkno

wn  unknown



                e               mg/dL                                   

 

         All     leukocyte_es  unknown  SMALL   unknown          _327    unknown

  unknown



                terase_urine_                                                 



                by_dipstick                                                 

 

         All     urobilinogen  unknown  1       unknown          _326    unknown

  unknown



                _urine_semiqu         (NORMAL)                                 



                antitative_di                                                 



                pstick_                                                 

 

         All     specific_gra  unknown  1.020   unknown          _325    unknown

  unknown



                vity_urine                                                 

 

         All     nitrite_urin  unknown  NEGATIVE  unknown          _323    unkno

wn  unknown



                e_semiquantit                                                 



                ative                                                   

 

         All     ketones_urin  unknown  NEGATIVE  unknown          _322    unkno

wn  unknown



                e_by_test_str                                                 



                ip                                                      

 

         All     bilirubin_ur  unknown  NEGATIVE  unknown          _319    unkno

wn  unknown



                ine                                                     

 

         All     mean_corpusc  unknown  94.9    unknown  fL      _315    unknown

  unknown



                ular_volume_R                                                 



                BC                                                      

 

         All     Urea_nitroge  unknown  18      unknown  mg/dL   _3094-0  unknow

n  unknown



                n_Mass_volume                                                 



                _in_Serum_or_                                                 



                Plasma                                                  

 

         All     globulin_ser  unknown  2.6     unknown          _3059   unknown

  unknown



                um                                                      

 

         All     alkaline_pho  unknown  81      unknown  U/L     _3      unknown

  unknown



                sphatase_seru                                                 



                m                                                       

 

         All     Sodium_Moles  unknown  138     unknown  mmol/   _2951-2  unknow

n  unknown



                _volume_in_Se                         L                       



                rum_or_Plasma                                                 

 

         All     Protein_Mass  unknown  5.8     unknown  g/dL    _2885-2  unknow

n  unknown



                _volume_in_Se                                                 



                rum_or_Plasma                                                 

 

         All     eosinophil_c  unknown  0.3 10  unknown          _285    unknown

  unknown



                ount_blood         3/UL                                    

 

         All     anion_gap_se  unknown  7.0     unknown          _279    unknown

  unknown



                rum                                                     

 

         All     mean_platele  unknown  8.6     unknown  fL      _2784   unknown

  unknown



                t_volume                                                 

 

         All     urine_color  unknown  YELLOW  unknown          _2751   unknown 

 unknown

 

         All     basophil_cou  unknown  0.1 10  unknown          _2427   unknown

  unknown



                nt_blood         3/UL                                    

 

         All     monocyte_cou  unknown  0.5 10  unknown          _2422   unknown

  unknown



                nt_blood         3/UL                                    

 

         All     lymphocyte_c  unknown  0.8 10  unknown          _2420   unknown

  unknown



                ount_blood         3/UL                                    

 

         All     neutrophil_c  unknown  4.1 10  unknown          _2418   unknown

  unknown



                ount_blood         3/UL                                    

 

         All     Glucose_Mass  unknown  NEGATIVE  unknown          _2350-7  unkn

own  unknown



                _volume_in_Ur         mg/dL                                   



                ine                                                     

 

         All     Glucose_Mass  unknown  129     unknown  mg/dL   _2345-7  unknow

n  unknown



                _volume_in_Se                                                 



                rum_or_Plasma                                                 

 

         All     Globulin_Mas  unknown  2.6     unknown          _2336-6  unknow

n  unknown



                s_volume_in_S                                                 



                vanessa                                                    

 

         All     Creatinine_M  unknown  0.8     unknown  mg/dL   _2160-0  unknow

n  unknown



                ass_volume_in                                                 



                _Serum_or_Pla                                                 



                sma                                                     

 

         All     Chloride_Mol  unknown  103     unknown  mmol/   _-0  unknow

n  unknown



                es_volume_in_                         L                       



                Serum_or_Plas                                                 



                ma                                                      

 

         All     carbon_dioxi  unknown  28      unknown  mmol/   _-  unknow

n  unknown



                de_serum_tota                         L                       



                l                                                       

 

         All     Calcium_Mole  unknown  8.2     unknown  mg/dL   _-  unknow

n  unknown



                s_volume_in_S                                                 



                erum_or_Plasm                                                 



                a                                                       

 

         All     albumin_seru  unknown  3.2     unknown  g/dL    _2      unknown

  unknown



                m                                                       

 

         All     Bilirubin.to  unknown  0.6     unknown  mg/dL   _-  unknow

n  unknown



                tal_Mass_volu                                                 



                me_in_Serum_o                                                 



                r_Plasma                                                 

 

         All     Aspartate_am  unknown  12      unknown  U/L     _-8  unknow

n  unknown



                inotransferas                                                 



                e_Enzymatic_a                                                 



                ctivity_volum                                                 



                e_in_Serum_or                                                 



                _Plasma                                                 

 

         All     Anion_gap_4_  unknown  7.0     unknown          _1863-0  unknow

n  unknown



                in_Serum_or_P                                                 



                lasma                                                   

 

         All     creatinine_s  unknown  0.8     unknown  mg/dL   _18     unknown

  unknown



                vanessa                                                    

 

         All     Alkaline_pho  unknown  81      unknown  U/L     _1783-0  unknow

n  unknown



                sphatase_Enzy                                                 



                matic_activit                                                 



                y_volume_in_B                                                 



                lood                                                    

 

         All     Albumin_Glob  unknown  1.2     unknown          _1759-0  unknow

n  unknown



                ulin_Mass_Rat                                                 



                io_in_Serum_o                                                 



                r_Plasma                                                 

 

         All     Albumin_Mass  unknown  3.2     unknown  g/dL    _1751-7  unknow

n  unknown



                _volume_in_Se                                                 



                rum_or_Plasma                                                 

 

         All     Alanine_amin  unknown  < 10 IU/L  unknown          _1742-6  unk

nown  unknown



                otransferase_                                                 



                Enzymatic_act                                                 



                ivity_volume_                                                 



                in_Serum_or_P                                                 



                lasma                                                   

 

         All     mean_corpusc  unknown  32.5    unknown  g/dL    _17029  unknown

  unknown



                ular_hemoglob                                                 



                in_concentrat                                                 



                ion_rbc                                                 

 

         All     sodium_serum  unknown  138     unknown  mmol/   _159    unknown

  unknown



                                                L                       

 

         All     albumin_glob  unknown  1.2     unknown          _146    unknown

  unknown



                ulin_ratio_se                                                 



                rum                                                     

 

         All     chloride_ser  unknown  103     unknown  mmol/   _13     unknown

  unknown



                um                              L                       

 

         All     calcium_seru  unknown  8.2     unknown  mg/dL   _11     unknown

  unknown



                m                                                       

 

         All     mean_corpusc  unknown  30.9    unknown  pg      _1031   unknown

  unknown



                ular_hemoglob                                                 



                in_RBC                                                  

 

         All     red_blood_ce  unknown  14.0    unknown  %       _1030   unknown

  unknown



                ll_distributi                                                 



                on_width                                                 

 

         All     WBC_urine_on  unknown  6-10 /HPF  unknown          _1016   unkn

own  unknown



                _microscopy                                                 

 

         All     T       unknown  5.7 X10  unknown          WBC     unknown  unk

nown



                                3/UL                                    

 

         All     WBC_URINE  unknown  6-10 /HPF  unknown          UWBC    unknown

  unknown

 

         All     UROBILINOGEN  unknown  1       unknown          UUROBIL  unknow

n  unknown



                _URINE          (NORMAL)                                 

 

         All     SPECIFIC_GRA  unknown  1.020   unknown          USG     unknown

  unknown



                VITY_URINE                                                 

 

         All     T       unknown  6-10 /HPF  unknown          UR_WBC  unknown  u

nknown

 

         All     T       unknown  1       unknown          UR_URO  unknown  unkn

own



                                (NORMAL)                                 

 

         All     T       unknown  1.020   unknown          UR_SG   unknown  unkn

own

 

         All     T       unknown  7.0     unknown          UR_PH   unknown  unkn

own

 

         All     T       unknown  NEGATIVE  unknown          UR_NIT  unknown  un

known

 

         All     T       unknown  SMALL   unknown          UR_LEU_E  unknown  un

known



                                                        STERASE         

 

         All     T       unknown  NEGATIVE  unknown          UR_KETO_  unknown  

unknown



                                                        UA_             

 

         All     T       unknown  NEGATIVE  unknown          UR_GLU  unknown  un

known



                                mg/dL                                   

 

         All     T       unknown  YELLOW  unknown          UR_COLOR  unknown  un

known

 

         All     T       unknown  CLEAR   unknown          UR_CLARI  unknown  un

known



                                                        TY              

 

         All     T       unknown  NEGATIVE  unknown          UR_BILI  unknown  u

nknown

 

         All     PH_URINE  unknown  7.0     unknown          UPH     unknown  un

known

 

         All     NITRITE_URIN  unknown  NEGATIVE  unknown          UNITRITE  unk

nown  unknown



                E                                                       

 

         All     LEUKOCYTE_ES  unknown  SMALL   unknown          ULEUK   unknown

  unknown



                TERASE_URINE                                                 

 

         All     KETONES_URIN  unknown  NEGATIVE  unknown          UKET    unkno

wn  unknown



                E_UA_                                                   

 

         All     GLUCOSE_URIN  unknown  NEGATIVE  unknown          UGLUC   unkno

wn  unknown



                E_UA_           mg/dL                                   

 

         All     COLOR_URINE  unknown  YELLOW  unknown          UCOL    unknown 

 unknown

 

         All     CLARITY_URIN  unknown  CLEAR   unknown          UCLAR   unknown

  unknown



                E                                                       

 

         All     BILIRUBIN_UR  unknown  NEGATIVE  unknown          UBIL    unkno

wn  unknown



                INE                                                     

 

         All     T       unknown  0.6     unknown  mg/dL   TOTAL_BI  unknown  un

known



                                                        LI              

 

         All     T       unknown  14.0    unknown  %       RDW     unknown  unkn

own

 

         All     T       unknown  3.53 10  unknown          RBC     unknown  unk

nown



                                6/UL                                    

 

         All     T       unknown  5.8     unknown  g/dL    PRO_TOTA  unknown  un

known



                                                        L               

 

         All     T       unknown  209 10  unknown          PLT     unknown  unkn

own



                                3/UL                                    

 

         All     NEUTROPHILS_  unknown  4.1 10  unknown          NE_     unknown

  unknown



                AUTO_           3/UL                                    

 

         All     T       unknown  4.1 10  unknown          NEUT_AUT  unknown  un

known



                                3/UL                    O_              

 

         All     T       unknown  138     unknown  mmol/   NA      unknown  unkn

own



                                                L                       

 

         All     T       unknown  8.6     unknown  fL      MPV     unknown  unkn

own

 

         All     MONOCYTES_AU  unknown  0.5 10  unknown          MO_     unknown

  unknown



                TO_             3/UL                                    

 

         All     T       unknown  0.5 10  unknown          MONO_AUT  unknown  un

known



                                3/UL                    O_              

 

         All     T       unknown  94.9    unknown  fL      MCV     unknown  unkn

own

 

         All     T       unknown  32.5    unknown  g/dL    MCHC    unknown  unkn

own

 

         All     T       unknown  30.9    unknown  pg      MCH     unknown  unkn

own

 

         All     LYMPHOCYTES_  unknown  0.8 10  unknown          LY_     unknown

  unknown



                AUTO_           3/UL                                    

 

         All     T       unknown  0.8 10  unknown          LYMPH_AU  unknown  un

known



                                3/UL                    TO_             

 

         All     T       unknown  4.5     unknown  meq/L   K       unknown  unkn

own

 

         All     T       unknown  10.9    unknown  g/dL    HGB     unknown  unkn

own

 

         All     T       unknown  33.5    unknown  %       HCT     unknown  unkn

own

 

         All     T       unknown  129     unknown  mg/dL   GLU     unknown  unkn

own

 

         All     T       unknown  2.6     unknown          GLOB    unknown  unkn

own

 

         All     T       unknown  70      unknown  mL/mi   GFR_-_MD  unknown  un

known



                                                n       RD              

 

         All     GFR_-_MDRD  unknown  70      unknown  mL/mi   GFR     unknown  

unknown



                                                n                       

 

         All     T       unknown  7.0     unknown          GAP     unknown  unkn

own

 

         All     EOSINOPHILS_  unknown  0.3 10  unknown          EO_     unknown

  unknown



                AUTO_           3/UL                                    

 

         All     T       unknown  0.3 10  unknown          EOS_AUTO  unknown  un

known



                                3/UL                    _               

 

         All     T       unknown  0.8     unknown  mg/dL   CREAT   unknown  unkn

own

 

         All     T       unknown  28      unknown  mmol/   CO2     unknown  unkn

own



                                                L                       

 

         All     T       unknown  103     unknown  mmol/   CL      unknown  unkn

own



                                                L                       

 

         All     T       unknown  8.2     unknown  mg/dL   CA      unknown  unkn

own

 

         All     T       unknown  18      unknown  mg/dL   BUN     unknown  unkn

own

 

         All     BILIRUBIN_TO  unknown  0.6     unknown  mg/dL   BILIT   unknown

  unknown



                CONNIE                                                     

 

         All     BASOPHILS_AU  unknown  0.1 10  unknown          BA_     unknown

  unknown



                TO_             3/UL                                    

 

         All     T       unknown  0.1 10  unknown          BASO_AUT  unknown  un

known



                                3/UL                    O_              

 

         All     T       unknown  1.2     unknown          A_G_RATI  unknown  un

known



                                                        O               

 

         All     T       unknown  12      unknown  U/L     AST     unknown  unkn

own

 

         All     T       unknown  < 10 IU/L  unknown          ALT_SGPT  unknown 

 unknown



                                                        _               

 

         All     ALT_ALANINE_  unknown  < 10 IU/L  unknown          ALT     unkn

own  unknown



                AMINOTRANSFER                                                 



                ASE                                                     

 

         All     ALKALINE_PHO  unknown  81      unknown  U/L     ALP     unknown

  unknown



                SPHATASE                                                 

 

         All     T       unknown  81      unknown  U/L     ALK_PHOS  unknown  un

known

 

         All     T       unknown  3.2     unknown  g/dL    ALB     unknown  unkn

own

 

         All     ALBUMIN_GLOB  unknown  1.2     unknown          AGRATIO  unknow

n  unknown



                ULIN_RATIO                                                 







Vital Signs







                 date            measurement     value           source

 

                 2022        weight_standard  120.4           lb

 

                 2022        weight_metric   54.61           kg

 

                 2022        temperature_standard  97.6            F

 

                 2022        temperature_metric  36.44           C

 

                 2022        respiration_rate  14              /min

 

                 2022        height_standard  63.07           in

 

                 2022        height_metric   160.2           cm

 

                 2022        heart_rate      69              /min

 

                 2022        BP_systolic     112             mm[Hg]

 

                 2022        BP_diastolic    67              mm[Hg]

 

                 2022        BMI             21.36           kg/m2

 

                 2022        weight_standard  120.4           lb

 

                 2022        weight_metric   54.61           kg

 

                 2022        temperature_standard  97.4            F

 

                 2022        temperature_metric  36.33           C

 

                 2022        respiration_rate  15              /min

 

                 2022        height_standard  63.07           in

 

                 2022        height_metric   160.2           cm

 

                 2022        heart_rate      73              /min

 

                 2022        BP_systolic     151             mm[Hg]

 

                 2022        BP_diastolic    67              mm[Hg]

 

                 2022        BMI             21.36           kg/m2

## 2022-04-24 NOTE — CT REPORT
PROCEDURE:  CT brain without contrast

 

INDICATIONS:  fall head injury

 

TECHNIQUE:  

Noncontrast 4.5 mm thick angled axial sections acquired from the foramen magnum to the vertex.  For r
adiation dose reduction, the following was used:  automated exposure control, adjustment of mA and/or
 kV according to patient size.

 

COMPARISON:  None.

 

FINDINGS:  

Image quality:  Excellent.  

 

CSF spaces:  Basal cisterns are patent.  No extra-axial fluid collections.  Ventricles are normal in 
size and shape.  

 

Brain:  No midline shift.  No intracranial masses or hemorrhage.  Gray-white matter interface is norm
al.  Moderate atrophy and multifocal white matter chronic ischemic change noted. Atherosclerotic vasc
ular calcification noted in the cavernous segments of both internal carotid arteries as well as the i
ntradural vertebral arteries.

 

Skull and face:  Calvarium and visualized facial bones are intact, without suspicious lesions.  Lens 
replacements

 

Sinuses:  Visualized sinuses and mastoids are clear.  

 

IMPRESSION:  

 

Atrophy and chronic ischemic change without acute hemorrhage or mass effect

 

Reviewed by: Guy Alexis MD on 4/24/2022 11:31 AM JUAN

Approved by: Guy Alexis MD on 4/24/2022 11:31 AM AKADRIAN

 

 

Station ID:  [id number]

## 2022-05-17 ENCOUNTER — HOSPITAL ENCOUNTER (EMERGENCY)
Dept: HOSPITAL 76 - ED | Age: 77
Discharge: HOME | End: 2022-05-17
Payer: MEDICARE

## 2022-05-17 ENCOUNTER — HOSPITAL ENCOUNTER (OUTPATIENT)
Dept: HOSPITAL 76 - EMS | Age: 77
Discharge: ANOTHER HOSPITAL | End: 2022-05-17
Payer: MEDICARE

## 2022-05-17 VITALS — DIASTOLIC BLOOD PRESSURE: 68 MMHG | SYSTOLIC BLOOD PRESSURE: 160 MMHG

## 2022-05-17 DIAGNOSIS — R53.83: ICD-10-CM

## 2022-05-17 DIAGNOSIS — I48.91: ICD-10-CM

## 2022-05-17 DIAGNOSIS — R82.81: Primary | ICD-10-CM

## 2022-05-17 DIAGNOSIS — F02.80: ICD-10-CM

## 2022-05-17 DIAGNOSIS — G20: ICD-10-CM

## 2022-05-17 DIAGNOSIS — I10: ICD-10-CM

## 2022-05-17 DIAGNOSIS — R40.4: Primary | ICD-10-CM

## 2022-05-17 LAB
ALBUMIN DIAFP-MCNC: 3.8 G/DL (ref 3.2–5.5)
ALBUMIN/GLOB SERPL: 1.3 {RATIO} (ref 1–2.2)
ALP SERPL-CCNC: 89 IU/L (ref 42–121)
ALT SERPL W P-5'-P-CCNC: < 10 IU/L (ref 10–60)
ANION GAP SERPL CALCULATED.4IONS-SCNC: 9 MMOL/L (ref 6–13)
AST SERPL W P-5'-P-CCNC: 17 IU/L (ref 10–42)
BASOPHILS NFR BLD AUTO: 0.1 10^3/UL (ref 0–0.1)
BASOPHILS NFR BLD AUTO: 0.7 %
BILIRUB BLD-MCNC: 0.7 MG/DL (ref 0.2–1)
BUN SERPL-MCNC: 19 MG/DL (ref 6–20)
CALCIUM UR-MCNC: 8.7 MG/DL (ref 8.5–10.3)
CHLORIDE SERPL-SCNC: 105 MMOL/L (ref 101–111)
CLARITY UR REFRACT.AUTO: (no result)
CO2 SERPL-SCNC: 26 MMOL/L (ref 21–32)
CREAT SERPLBLD-SCNC: 0.8 MG/DL (ref 0.4–1)
EOSINOPHIL # BLD AUTO: 0.1 10^3/UL (ref 0–0.7)
EOSINOPHIL NFR BLD AUTO: 1.8 %
ERYTHROCYTE [DISTWIDTH] IN BLOOD BY AUTOMATED COUNT: 14.8 % (ref 12–15)
GFRSERPLBLD MDRD-ARVRAT: 70 ML/MIN/{1.73_M2} (ref 89–?)
GLOBULIN SER-MCNC: 2.9 G/DL (ref 2.1–4.2)
GLUCOSE SERPL-MCNC: 92 MG/DL (ref 70–100)
GLUCOSE UR QL STRIP.AUTO: NEGATIVE MG/DL
HCT VFR BLD AUTO: 35.8 % (ref 37–47)
HGB UR QL STRIP: 11.5 G/DL (ref 12–16)
INR PPP: 1.1 (ref 0.8–1.2)
KETONES UR QL STRIP.AUTO: NEGATIVE MG/DL
LIPASE SERPL-CCNC: 35 U/L (ref 22–51)
LYMPHOCYTES # SPEC AUTO: 1.4 10^3/UL (ref 1.5–3.5)
LYMPHOCYTES NFR BLD AUTO: 20.6 %
MAGNESIUM SERPL-MCNC: 2 MG/DL (ref 1.7–2.8)
MCH RBC QN AUTO: 31.1 PG (ref 27–31)
MCHC RBC AUTO-ENTMCNC: 32.1 G/DL (ref 32–36)
MCV RBC AUTO: 96.8 FL (ref 81–99)
MONOCYTES # BLD AUTO: 0.6 10^3/UL (ref 0–1)
MONOCYTES NFR BLD AUTO: 9 %
NEUTROPHILS # BLD AUTO: 4.5 10^3/UL (ref 1.5–6.6)
NEUTROPHILS # SNV AUTO: 6.7 X10^3/UL (ref 4.8–10.8)
NEUTROPHILS NFR BLD AUTO: 67.6 %
NITRITE UR QL STRIP.AUTO: NEGATIVE
NRBC # BLD AUTO: 0 /100WBC
NRBC # BLD AUTO: 0 X10^3/UL
PDW BLD AUTO: 8.9 FL (ref 7.9–10.8)
PH UR STRIP.AUTO: 6 PH (ref 5–7.5)
PLATELET # BLD: 203 10^3/UL (ref 130–450)
POTASSIUM SERPL-SCNC: 4.4 MMOL/L (ref 3.5–5)
PROT SPEC-MCNC: 6.7 G/DL (ref 6.7–8.2)
PROT UR STRIP.AUTO-MCNC: NEGATIVE MG/DL
PROTHROM ACT/NOR PPP: 12.4 SECS (ref 9.9–12.6)
RBC # UR STRIP.AUTO: (no result) /UL
RBC # URNS HPF: (no result) /HPF (ref 0–5)
RBC MAR: 3.7 10^6/UL (ref 4.2–5.4)
SODIUM SERPLBLD-SCNC: 140 MMOL/L (ref 135–145)
SP GR UR STRIP.AUTO: 1.01 (ref 1–1.03)
SQUAMOUS URNS QL MICRO: (no result)
UROBILINOGEN UR QL STRIP.AUTO: (no result) E.U./DL
UROBILINOGEN UR STRIP.AUTO-MCNC: NEGATIVE MG/DL

## 2022-05-17 PROCEDURE — 87086 URINE CULTURE/COLONY COUNT: CPT

## 2022-05-17 PROCEDURE — 83690 ASSAY OF LIPASE: CPT

## 2022-05-17 PROCEDURE — 81001 URINALYSIS AUTO W/SCOPE: CPT

## 2022-05-17 PROCEDURE — 85610 PROTHROMBIN TIME: CPT

## 2022-05-17 PROCEDURE — 84484 ASSAY OF TROPONIN QUANT: CPT

## 2022-05-17 PROCEDURE — 85025 COMPLETE CBC W/AUTO DIFF WBC: CPT

## 2022-05-17 PROCEDURE — 81003 URINALYSIS AUTO W/O SCOPE: CPT

## 2022-05-17 PROCEDURE — 93005 ELECTROCARDIOGRAM TRACING: CPT

## 2022-05-17 PROCEDURE — 99284 EMERGENCY DEPT VISIT MOD MDM: CPT

## 2022-05-17 PROCEDURE — 36415 COLL VENOUS BLD VENIPUNCTURE: CPT

## 2022-05-17 PROCEDURE — 83735 ASSAY OF MAGNESIUM: CPT

## 2022-05-17 PROCEDURE — 80053 COMPREHEN METABOLIC PANEL: CPT

## 2022-05-17 PROCEDURE — 99283 EMERGENCY DEPT VISIT LOW MDM: CPT

## 2022-05-17 NOTE — CT REPORT
PROCEDURE:  HEAD WO

 

INDICATIONS:  lethargic onset this AM.

 

TECHNIQUE:  

Noncontrast 4.5 mm thick angled axial sections acquired from the foramen magnum to the vertex.  For r
adiation dose reduction, the following was used:  automated exposure control, adjustment of mA and/or
 kV according to patient size.

 

COMPARISON:  3/31/2022 and 4/24/2022

 

FINDINGS:  

Image quality: Diagnostic.  

 

CSF spaces:  Basal cisterns are patent.  No extra-axial fluid collections.  Ventricles are normal in 
size and shape.  

 

Brain: No midline shift.  No intracranial masses or hemorrhage.  No mass effect. Gray-white matter in
terface is normal. There cerebral volume loss for age with resultant ventricular and sulcal prominenc
e. There are periventricular and deep white matter chronic small vessel ischemic changes. Atheroscler
otic calcifications are noted in the intracranial segments of the bilateral internal carotid arteries
.

 

Skull and face:  Calvarium and visualized facial bones are intact, without suspicious lesions.  

 

Sinuses:  Visualized sinuses and mastoids are clear.  

 

IMPRESSION:  CT head without acute intracranial abnormalities. No mass or mass effect. No acute shad
rial fractures. Stable age-related senescent changes and sequela of chronic small vessel ischemic dis
ease.

 

Reviewed by: Sang Patricia MD on 5/17/2022 10:32 AM PDT

Approved by: Sang Patricia MD on 5/17/2022 10:32 AM PDT

 

 

Station ID:  [id number]

## 2022-05-17 NOTE — XRAY REPORT
PROCEDURE:  Chest 1 View X-Ray

 

INDICATIONS:  AMS/lethargic onset this AM

 

TECHNIQUE:  One view of the chest was acquired.  

 

COMPARISON:  4/24/2022

 

FINDINGS:  

 

Surgical changes and devices:  None.  

 

Lungs and pleura:  No pleural effusions or pneumothorax.  Lungs are clear.  

 

Mediastinum:  Mediastinal contours appear normal.  Heart size is normal.  

 

Bones and chest wall:  No suspicious bony lesions.  Overlying soft tissues appear unremarkable.  

 

IMPRESSION:  

No acute process.

 

Reviewed by: Alexander Carver MD on 5/17/2022 10:10 AM PDT

Approved by: Alexander Carver MD on 5/17/2022 10:10 AM PDT

 

 

Station ID:  [id number]

## 2022-05-17 NOTE — EXTERNAL MEDICAL SUMMARY RPT
Continuity of Care Document

                             Created on:May 17, 2022



Patient:FERNANDO THOMPSON

Sex:Female

:1945

External Reference #:[id number]





Demographics







                          Address                   [address] [place] [address] [place] [address]

 

                          Phone                     Unavailable

 

                          Preferred Language        Unknown

 

                          Marital Status            Unknown

 

                          Jain Affiliation     Unknown

 

                          Race                      Unknown

 

                          Ethnic Group              Unknown









Author







                          Organization              [facility]

 

                          [place] [address]

 

                          Phone                     [phone]









Care Team Providers







                    Name                Role                Phone

 

                    M.D.                Unavailable         Unavailable









Allergies

No information.



Encounters

No information.



Medications







                     date                description         facility

 

                     2022            metoprolol succinate  All

 

                     2022            atorvastatin        All

 

                     2022            quetiapine          All







Problems







                     date                description         facility

 

                     2022            XR SHOULDER 2-3 VIEW  All

 

                     2022            XR ELBOW 2 VIEWS    All

 

                     2022            Traumatic hematoma  All

 

                     2022            Total score?        All

 

                     2022            Tobacco use and exposure  All

 

                     2022            Tobacco smoking status NHIS  All

 

                     2022            Pain of right shoulder joint  All

 

                     2022            Pain in right shoulder  All

 

                     2022            Pain in right elbow  All

 

                     2022            Pain in joint involving upper arm  All

 

                     2022            Pain in joint involving shoulder region

  All

 

                     2022            Other injury of unspecified body region

, initial encounter  All

 

                     2022            Health-related behavior  All

 

                     2022            Former smoker       All

 

                     2022            Exercise            All

 

                     2022            Elbow joint pain    All

 

                     2022            Details of drug misuse behavior  All

 

                     2022            Contusion of unspecified site  All

 

                     2022            Alcohol use         All

 

                     2022            Total score?        All

 

                     2022            Tobacco use and exposure  All

 

                     2022            Tobacco smoking status NHIS  All

 

                     2022            Repeated falls      All

 

                     2022            Recurrent falls     All

 

                     2022            Former smoker       All

 

                     2022            Details of drug misuse behavior  All

 

                     2022            Alcohol use         All

 

                     2022            Abnormality of gait  All







Procedures







                     date                description         facility

 

                     2022            XR SHOULDER 2-3 VIEW  All







Results







            test       status     date       ordered by  attending  specimen anthony

e

 

            urea_nitrogen_blood  unknown    2022   unknown    unknown    unk

nown

 

            Erythrocytes_volume_in  unknown    2022   unknown    unknown    

unknown



           _Blood_by_Automated_cou                                             



           nt                                                     

 

            Erythrocyte_distributi  unknown    2022   unknown    unknown    

unknown



           on_width_Ratio_by_Autom                                             



           ated_count                                             

 

            MCV_Entitic_volume_by_  unknown    2022   unknown    unknown    

unknown



           Automated_count                                             

 

            MCH_Entitic_mass_by_Au  unknown    2022   unknown    unknown    

unknown



           tomated_count                                             

 

            Platelets_volume_in_Bl  unknown    2022   unknown    unknown    

unknown



           ood_by_Automated_count                                             

 

            Platelet_mean_volume_E  unknown    2022   unknown    unknown    

unknown



           ntitic_volume_in_Blood_                                             



           by_Rees-Sharon                                             

 

            neutrophil_count_blood  unknown    2022   unknown    unknown    

unknown

 

            Hemoglobin_Mass_volume  unknown    2022   unknown    unknown    

unknown



           _in_Blood                                              

 

            leukocyte_count_blood  unknown    2022   unknown    unknown    u

nknown

 

            erythrocyte_RBC_count  unknown    2022   unknown    unknown    u

nknown

 

            Leukocytes_volume_in_B  unknown    2022   unknown    unknown    

unknown



           lood_by_Automated_count                                             

 

            Glomerular_Filtration_  unknown    2022   unknown    unknown    

unknown



           rate                                                   

 

            platelet_count  unknown    [id number]   unknown    unknown    unknown

 

            hemoglobin_blood  unknown    2022   unknown    unknown    unknow

n

 

            hematocrit_blood  unknown    2022   unknown    unknown    unknow

n

 

            potassium_blood  unknown    2022   unknown    unknown    unknown

 

           Glomerular_filtration_r  unknown    2022   unknown    unknown    

unknown



           ate_1.73_sq_M.predicted                                             



           _among_non-blacks_Volum                                             



           e_Rate_Area_in_Serum_Pl                                             



           asma_or_Blood_by_Creati                                             



           nine-based_formula_MDRD                                             



           _                                                      

 

            Hematocrit_Volume_Frac  unknown    2022   unknown    unknown    

unknown



           tion_of_Blood_by_Automa                                             



           ted_count                                              

 

            bilirubin_serum_total  unknown    2022   unknown    unknown    u

nknown

 

            alanine_aminotransfera  unknown    2022   unknown    unknown    

unknown



           se_SGPT_serum                                             

 

            carbon_dioxide_serum_t  unknown    2022   unknown    unknown    

unknown



           otal                                                   

 

            aspartate_aminotransfe  unknown    2022   unknown    unknown    

unknown



           rase_SGOT_serum                                             

 

            protein_total_serum  unknown    2022   unknown    unknown    unk

nown

 

            blood_glucose  unknown    2022   unknown    unknown    unknown

 

            potassium_blood  unknown    2022   unknown    unknown    unknown

 

            mean_corpuscular_volum  unknown    2022   unknown    unknown    

unknown



           e_RBC                                                  

 

            Urea_nitrogen_Mass_vol  unknown    2022   unknown    unknown    

unknown



           ume_in_Serum_or_Plasma                                             

 

            globulin_serum  unknown    2022   unknown    unknown    unknown

 

            alkaline_phosphatase_s  unknown    2022   unknown    unknown    

unknown



           vanessa                                                   

 

            Sodium_Moles_volume_in  unknown    2022   unknown    unknown    

unknown



           _Serum_or_Plasma                                             

 

            Protein_Mass_volume_in  unknown    [id number]   unknown    unknown    

unknown



           _Serum_or_Plasma                                             

 

            anion_gap_serum  unknown    [id number]   unknown    unknown    unknown

 

            mean_platelet_volume  unknown    [id number]   unknown    unknown    un

known

 

            neutrophil_count_blood  unknown    2022   unknown    unknown    

unknown

 

            Glucose_Mass_volume_in  unknown    2022   unknown    unknown    

unknown



           _Serum_or_Plasma                                             

 

            Globulin_Mass_volume_i  unknown    2022   unknown    unknown    

unknown



           n_Serum                                                

 

            Creatinine_Mass_volume  unknown    2022   unknown    unknown    

unknown



           _in_Serum_or_Plasma                                             

 

            Chloride_Moles_volume_  unknown    2022   unknown    unknown    

unknown



           in_Serum_or_Plasma                                             

 

            carbon_dioxide_serum_t  unknown    2022   unknown    unknown    

unknown



           otal                                                   

 

            Calcium_Moles_volume_i  unknown    2022   unknown    unknown    

unknown



           n_Serum_or_Plasma                                             

 

            albumin_serum  unknown    2022   unknown    unknown    unknown

 

            Bilirubin.total_Mass_v  unknown    2022   unknown    unknown    

unknown



           olume_in_Serum_or_Plasm                                             



           a                                                      

 

            Aspartate_aminotransfe  unknown    2022   unknown    unknown    

unknown



           rase_Enzymatic_activity                                             



           _volume_in_Serum_or_Pla                                             



           sma                                                    

 

            Anion_gap_4_in_Serum_o  unknown    2022   unknown    unknown    

unknown



           r_Plasma                                               

 

            creatinine_serum  unknown    2022   unknown    unknown    unknow

n

 

            Alkaline_phosphatase_E  unknown    2022   unknown    unknown    

unknown



           nzymatic_activity_volum                                             



           e_in_Blood                                             

 

            Albumin_Globulin_Mass_  unknown    2022   unknown    unknown    

unknown



           Ratio_in_Serum_or_Plasm                                             



           a                                                      

 

            Albumin_Mass_volume_in  unknown    2022   unknown    unknown    

unknown



           _Serum_or_Plasma                                             

 

            Alanine_aminotransfera  unknown    2022   unknown    unknown    

unknown



           se_Enzymatic_activity_v                                             



           olume_in_Serum_or_Plasm                                             



           a                                                      

 

            mean_corpuscular_hemog  unknown    2022   unknown    unknown    

unknown



           lobin_concentration_rbc                                             

 

            sodium_serum  unknown    2022   unknown    unknown    unknown

 

            albumin_globulin_ratio  unknown    2022   unknown    unknown    

unknown



           _serum                                                 

 

            chloride_serum  unknown    2022   unknown    unknown    unknown

 

            calcium_serum  unknown    2022   unknown    unknown    unknown

 

            mean_corpuscular_hemog  unknown    2022   unknown    unknown    

unknown



           lobin_RBC                                              

 

            red_blood_cell_distrib  unknown    2022   unknown    unknown    

unknown



           ution_width                                             

 

            T          unknown    2022   unknown    unknown    unknown

 

            T          unknown    2022   unknown    unknown    unknown

 

            T          unknown    2022   unknown    unknown    unknown

 

            T          unknown    2022   unknown    unknown    unknown

 

            T          unknown    2022   unknown    unknown    unknown

 

            T          unknown    2022   unknown    unknown    unknown

 

            NEUTROPHILS_AUTO_  unknown    2022   unknown    unknown    unkno

wn

 

            T          unknown    2022   unknown    unknown    unknown

 

            T          unknown    [id number]   unknown    unknown    unknown

 

            T          unknown    56706373   unknown    unknown    unknown

 

            T          unknown    16786461   unknown    unknown    unknown

 

            T          unknown    95856345   unknown    unknown    unknown

 

            T          unknown    37976811   unknown    unknown    unknown

 

            T          unknown    39580434   unknown    unknown    unknown

 

            T          unknown    25351866   unknown    unknown    unknown

 

            T          unknown    58764165   unknown    unknown    unknown

 

            T          unknown    88790041   unknown    unknown    unknown

 

            T          unknown    68495443   unknown    unknown    unknown

 

            T          unknown    94414957   unknown    unknown    unknown

 

            GFR_-_MDRD  unknown    2022   unknown    unknown    unknown

 

            T          unknown    80686385   unknown    unknown    unknown

 

            T          unknown    99919691   unknown    unknown    unknown

 

            T          unknown    81305990   unknown    unknown    unknown

 

            T          unknown    39395617   unknown    unknown    unknown

 

            T          unknown    63439669   unknown    unknown    unknown

 

            T          unknown    85370628   unknown    unknown    unknown

 

            BILIRUBIN_TOTAL  unknown    56819409   unknown    unknown    unknown

 

            T          unknown    04693674   unknown    unknown    unknown

 

            T          unknown    2022   unknown    unknown    unknown

 

            T          unknown    2022   unknown    unknown    unknown

 

            ALT_ALANINE_AMINOTRANS  unknown    2022   unknown    unknown    

unknown



           FERASE                                                 

 

            ALKALINE_PHOSPHATASE  unknown    2022   unknown    unknown    un

known

 

            T          unknown    2022   unknown    unknown    unknown

 

            T          unknown    2022   unknown    unknown    unknown

 

            ALBUMIN_GLOBULIN_RATIO  unknown    60547152   unknown    unknown    

unknown

 

            urea_nitrogen_blood  unknown    59823690   unknown    unknown    unk

nown

 

            Erythrocytes_volume_in  unknown    31354113   unknown    unknown    

unknown



           _Blood_by_Automated_cou                                             



           nt                                                     

 

            Erythrocyte_distributi  unknown    84265546   unknown    unknown    

unknown



           on_width_Ratio_by_Autom                                             



           ated_count                                             

 

            MCV_Entitic_volume_by_  unknown    10033664   unknown    unknown    

unknown



           Automated_count                                             

 

            MCH_Entitic_mass_by_Au  unknown    26268794   unknown    unknown    

unknown



           tomated_count                                             

 

            Platelets_volume_in_Bl  unknown    85762116   unknown    unknown    

unknown



           ood_by_Automated_count                                             

 

            Platelet_mean_volume_E  unknown    67571886   unknown    unknown    

unknown



           ntitic_volume_in_Blood_                                             



           by_Rees-Sharon                                             

 

            neutrophil_count_blood  unknown    09758933   unknown    unknown    

unknown

 

            monocyte_count_blood  unknown    84657677   unknown    unknown    un

known

 

            lymphocyte_count_blood  unknown    16554140   unknown    unknown    

unknown

 

            Hemoglobin_Mass_volume  unknown    00433653   unknown    unknown    

unknown



           _in_Blood                                              

 

            eosinophil_count_blood  unknown    82221393   unknown    unknown    

unknown

 

            Basophils_volume_in_Bl  unknown    88873570   unknown    unknown    

unknown



           ood_by_Manual_count                                             

 

            leukocyte_count_blood  unknown    43660244   unknown    unknown    u

nknown

 

            erythrocyte_RBC_count  unknown    87275401   unknown    unknown    u

nknown

 

            Leukocytes_volume_in_B  unknown    69334188   unknown    unknown    

unknown



           lood_by_Automated_count                                             

 

            Glomerular_Filtration_  unknown    77201192   unknown    unknown    

unknown



           rate                                                   

 

            platelet_count  unknown    33645428   unknown    unknown    unknown

 

            hemoglobin_blood  unknown    63829125   unknown    unknown    unknow

n

 

            hematocrit_blood  unknown    56481110   unknown    unknown    unknow

n

 

            INR_in_Platelet_poor_p  unknown    12837717   unknown    unknown    

unknown



           lasma_by_Coagulation_as                                             



           say                                                    

 

            potassium_blood  unknown    45703572   unknown    unknown    unknown

 

            Prothrombin_time_PT_  unknown    02702231   unknown    unknown    un

known

 

            WBC_urine_on_microscop  unknown    20398807   unknown    unknown    

unknown



           y                                                      

 

            Urobilinogen_Presence_  unknown    41559835   unknown    unknown    

unknown



           in_Urine_by_Test_strip                                             

 

            Specific_gravity_of_Ur  unknown    56095017   unknown    unknown    

unknown



           ine_by_Test_strip                                             

 

            Nitrite_Presence_in_Ur  unknown    33426547   unknown    unknown    

unknown



           ine_by_Test_strip                                             

 

            Leukocyte_esterase_Pre  unknown    55525362   unknown    unknown    

unknown



           sence_in_Urine_by_Test_                                             



           strip                                                  

 

            Ketones_Mass_volume_in  unknown    31264276   unknown    unknown    

unknown



           _Urine_by_Test_strip                                             

 

            Color_of_Urine  unknown    43114989   unknown    unknown    unknown

 

            Bilirubin.total_Presen  unknown    19882380   unknown    unknown    

unknown



           ce_in_Urine_by_Test_str                                             



           ip                                                     

 

            clarity_urine_point  unknown    67760912   unknown    unknown    unk

nown

 

            pH_study_of_acidity  unknown    77994259   unknown    unknown    unk

nown

 

            prothrombin_time_patie  unknown    10671510   unknown    unknown    

unknown



           nt_                                                    

 

           Glomerular_filtration_r  unknown    38443037   unknown    unknown    

unknown



           ate_1.73_sq_M.predicted                                             



           _among_non-blacks_Volum                                             



           e_Rate_Area_in_Serum_Pl                                             



           asma_or_Blood_by_Creati                                             



           nine-based_formula_MDRD                                             



           _                                                      

 

            Hematocrit_Volume_Frac  unknown    48649033   unknown    unknown    

unknown



           tion_of_Blood_by_Automa                                             



           ted_count                                              

 

            bilirubin_serum_total  unknown    28696656   unknown    unknown    u

nknown

 

            alanine_aminotransfera  unknown    91571523   unknown    unknown    

unknown



           se_SGPT_serum                                             

 

            carbon_dioxide_serum_t  unknown    83928716   unknown    unknown    

unknown



           otal                                                   

 

            aspartate_aminotransfe  unknown    77364035   unknown    unknown    

unknown



           rase_SGOT_serum                                             

 

            protein_total_serum  unknown    28352913   unknown    unknown    unk

nown

 

            blood_glucose  unknown    15501826   unknown    unknown    unknown

 

            potassium_blood  unknown    28768696   unknown    unknown    unknown

 

            glucose_urine  unknown    76163263   unknown    unknown    unknown

 

            leukocyte_esterase_uri  unknown    86461291   unknown    unknown    

unknown



           ne_by_dipstick                                             

 

            urobilinogen_urine_sem  unknown    13510395   unknown    unknown    

unknown



           iquantitative_dipstick_                                             

 

            specific_gravity_urine  unknown    34848961   unknown    unknown    

unknown

 

            nitrite_urine_semiquan  unknown    26241660   unknown    unknown    

unknown



           titative                                               

 

            ketones_urine_by_test_  unknown    36084952   unknown    unknown    

unknown



           strip                                                  

 

            bilirubin_urine  unknown    74206516   unknown    unknown    unknown

 

            mean_corpuscular_volum  unknown    02468906   unknown    unknown    

unknown



           e_RBC                                                  

 

            Urea_nitrogen_Mass_vol  unknown    15088499   unknown    unknown    

unknown



           ume_in_Serum_or_Plasma                                             

 

            international_normaliz  unknown    09485880   unknown    unknown    

unknown



           ed_ratio_INR_                                             

 

            globulin_serum  unknown    39839401   unknown    unknown    unknown

 

            alkaline_phosphatase_s  unknown    02400860   unknown    unknown    

unknown



           vanessa                                                   

 

            Sodium_Moles_volume_in  unknown    76225966   unknown    unknown    

unknown



           _Serum_or_Plasma                                             

 

            Protein_Mass_volume_in  unknown    [id number]   unknown    unknown    

unknown



           _Serum_or_Plasma                                             

 

            eosinophil_count_blood  unknown    [id number]   unknown    unknown    

unknown

 

            anion_gap_serum  unknown    [id number]   unknown    unknown    unknown

 

            mean_platelet_volume  unknown    60844613   unknown    unknown    un

known

 

            urine_color  unknown    87779621   unknown    unknown    unknown

 

            basophil_count_blood  unknown    91581689   unknown    unknown    un

known

 

            monocyte_count_blood  unknown    38764773   unknown    unknown    un

known

 

            lymphocyte_count_blood  unknown    99679000   unknown    unknown    

unknown

 

            neutrophil_count_blood  unknown    [id number]   unknown    unknown    

unknown

 

            Glucose_Mass_volume_in  unknown    60879704   unknown    unknown    

unknown



           _Urine                                                 

 

            Glucose_Mass_volume_in  unknown    47252405   unknown    unknown    

unknown



           _Serum_or_Plasma                                             

 

            Globulin_Mass_volume_i  unknown    57330481   unknown    unknown    

unknown



           n_Serum                                                

 

            Creatinine_Mass_volume  unknown    94193512   unknown    unknown    

unknown



           _in_Serum_or_Plasma                                             

 

            Chloride_Moles_volume_  unknown    42210652   unknown    unknown    

unknown



           in_Serum_or_Plasma                                             

 

            carbon_dioxide_serum_t  unknown    10689503   unknown    unknown    

unknown



           otal                                                   

 

            Calcium_Moles_volume_i  unknown    71606724   unknown    unknown    

unknown



           n_Serum_or_Plasma                                             

 

            albumin_serum  unknown    61513206   unknown    unknown    unknown

 

            Bilirubin.total_Mass_v  unknown    06070076   unknown    unknown    

unknown



           olume_in_Serum_or_Plasm                                             



           a                                                      

 

            Aspartate_aminotransfe  unknown    72869990   unknown    unknown    

unknown



           rase_Enzymatic_activity                                             



           _volume_in_Serum_or_Pla                                             



           sma                                                    

 

            Anion_gap_4_in_Serum_o  unknown    30045299   unknown    unknown    

unknown



           r_Plasma                                               

 

            creatinine_serum  unknown    96998586   unknown    unknown    unknow

n

 

            Alkaline_phosphatase_E  unknown    98639016   unknown    unknown    

unknown



           nzymatic_activity_volum                                             



           e_in_Blood                                             

 

            Albumin_Globulin_Mass_  unknown    17832531   unknown    unknown    

unknown



           Ratio_in_Serum_or_Plasm                                             



           a                                                      

 

            Albumin_Mass_volume_in  unknown    26537342   unknown    unknown    

unknown



           _Serum_or_Plasma                                             

 

            Alanine_aminotransfera  unknown    64617710   unknown    unknown    

unknown



           se_Enzymatic_activity_v                                             



           olume_in_Serum_or_Plasm                                             



           a                                                      

 

            mean_corpuscular_hemog  unknown    41639054   unknown    unknown    

unknown



           lobin_concentration_rbc                                             

 

            sodium_serum  unknown    37751641   unknown    unknown    unknown

 

            albumin_globulin_ratio  unknown    09652067   unknown    unknown    

unknown



           _serum                                                 

 

            chloride_serum  unknown    80177007   unknown    unknown    unknown

 

            calcium_serum  unknown    05267214   unknown    unknown    unknown

 

            mean_corpuscular_hemog  unknown    41729704   unknown    unknown    

unknown



           lobin_RBC                                              

 

            red_blood_cell_distrib  unknown    19140746   unknown    unknown    

unknown



           ution_width                                             

 

            WBC_urine_on_microscop  unknown    45233195   unknown    unknown    

unknown



           y                                                      

 

            T          unknown    32741489   unknown    unknown    unknown

 

            WBC_URINE  unknown    25292231   unknown    unknown    unknown

 

            UROBILINOGEN_URINE  unknown    27746885   unknown    unknown    unkn

own

 

            SPECIFIC_GRAVITY_URINE  unknown    32577231   unknown    unknown    

unknown

 

            T          unknown    02323223   unknown    unknown    unknown

 

            T          unknown    63092161   unknown    unknown    unknown

 

            T          unknown    19597368   unknown    unknown    unknown

 

            T          unknown    03963096   unknown    unknown    unknown

 

            T          unknown    47222952   unknown    unknown    unknown

 

            T          unknown    54448387   unknown    unknown    unknown

 

            T          unknown    10834705   unknown    unknown    unknown

 

            T          unknown    00689835   unknown    unknown    unknown

 

            T          unknown    43735737   unknown    unknown    unknown

 

            T          unknown    60810011   unknown    unknown    unknown

 

            T          unknown    99513802   unknown    unknown    unknown

 

            PH_URINE   unknown    99428295   unknown    unknown    unknown

 

            NITRITE_URINE  unknown    43986257   unknown    unknown    unknown

 

            LEUKOCYTE_ESTERASE_URI  unknown    09050619   unknown    unknown    

unknown



           NE                                                     

 

            KETONES_URINE_UA_  unknown    83961540   unknown    unknown    unkno

wn

 

            GLUCOSE_URINE_UA_  unknown    07014277   unknown    unknown    unkno

wn

 

            COLOR_URINE  unknown    18902063   unknown    unknown    unknown

 

            CLARITY_URINE  unknown    26315098   unknown    unknown    unknown

 

            BILIRUBIN_URINE  unknown    08880830   unknown    unknown    unknown

 

            T          unknown    77906490   unknown    unknown    unknown

 

            T          unknown    32035162   unknown    unknown    unknown

 

            T          unknown    59151600   unknown    unknown    unknown

 

            T          unknown    06233418   unknown    unknown    unknown

 

            T          unknown    82424678   unknown    unknown    unknown

 

            T          unknown    02335054   unknown    unknown    unknown

 

            NEUTROPHILS_AUTO_  unknown    51797720   unknown    unknown    unkno

wn

 

            T          unknown    79169156   unknown    unknown    unknown

 

            T          unknown    03689355   unknown    unknown    unknown

 

            T          unknown    30689572   unknown    unknown    unknown

 

            MONOCYTES_AUTO_  unknown    82146869   unknown    unknown    unknown

 

            T          unknown    78896964   unknown    unknown    unknown

 

            T          unknown    26329363   unknown    unknown    unknown

 

            T          unknown    91856997   unknown    unknown    unknown

 

            T          unknown    93932167   unknown    unknown    unknown

 

            LYMPHOCYTES_AUTO_  unknown    12277209   unknown    unknown    unkno

wn

 

            T          unknown    62080045   unknown    unknown    unknown

 

            T          unknown    41499301   unknown    unknown    unknown

 

            T          unknown    68554065   unknown    unknown    unknown

 

            T          unknown    16730513   unknown    unknown    unknown

 

            T          unknown    35984505   unknown    unknown    unknown

 

            T          unknown    68526505   unknown    unknown    unknown

 

            T          unknown    21776715   unknown    unknown    unknown

 

            T          unknown    09298842   unknown    unknown    unknown

 

            GFR_-_MDRD  unknown    86480807   unknown    unknown    unknown

 

            T          unknown    27937302   unknown    unknown    unknown

 

            EOSINOPHILS_AUTO_  unknown    25730230   unknown    unknown    unkno

wn

 

            T          unknown    06562721   unknown    unknown    unknown

 

            T          unknown    62642661   unknown    unknown    unknown

 

            T          unknown    73119741   unknown    unknown    unknown

 

            T          unknown    38725901   unknown    unknown    unknown

 

            T          unknown    05804127   unknown    unknown    unknown

 

            T          unknown    50746459   unknown    unknown    unknown

 

            BILIRUBIN_TOTAL  unknown    04460046   unknown    unknown    unknown

 

            BASOPHILS_AUTO_  unknown    26889776   unknown    unknown    unknown

 

            T          unknown    64746470   unknown    unknown    unknown

 

            T          unknown    39302628   unknown    unknown    unknown

 

            T          unknown    09317663   unknown    unknown    unknown

 

            T          unknown    77482714   unknown    unknown    unknown

 

            ALT_ALANINE_AMINOTRANS  unknown    36417539   unknown    unknown    

unknown



           FERASE                                                 

 

            ALKALINE_PHOSPHATASE  unknown    49675877   unknown    unknown    un

known

 

            T          unknown    92749263   unknown    unknown    unknown

 

            T          unknown    86999820   unknown    unknown    unknown

 

            ALBUMIN_GLOBULIN_RATIO  unknown    02801462   unknown    unknown    

unknown

 

            urea_nitrogen_blood  unknown    13331543   unknown    unknown    unk

nown

 

            Erythrocytes_volume_in  unknown    98617068   unknown    unknown    

unknown



           _Blood_by_Automated_cou                                             



           nt                                                     

 

            Erythrocyte_distributi  unknown    51477886   unknown    unknown    

unknown



           on_width_Ratio_by_Autom                                             



           ated_count                                             

 

            [facility]_Entitic_volume_by_  unknown    48659621   unknown    unknown    

unknown



           Automated_count                                             

 

            [facility]_Entitic_mass_by_Au  unknown    2022   unknown    unknown    

unknown



           tomated_count                                             

 

            Platelets_volume_in_Bl  unknown    2022   unknown    unknown    

unknown



           ood_by_Automated_count                                             

 

            Platelet_mean_volume_E  unknown    2022   unknown    unknown    

unknown



           ntitic_volume_in_Blood_                                             



           by_Rees-Sharon                                             

 

            neutrophil_count_blood  unknown    60180207   unknown    unknown    

unknown

 

            monocyte_count_blood  unknown    93392603   unknown    unknown    un

known

 

            lymphocyte_count_blood  unknown    2022   unknown    unknown    

unknown

 

            Hemoglobin_Mass_volume  unknown    2022   unknown    unknown    

unknown



           _in_Blood                                              

 

            eosinophil_count_blood  unknown    56596052   unknown    unknown    

unknown

 

            Basophils_volume_in_Bl  unknown    2022   unknown    unknown    

unknown



           ood_by_Manual_count                                             

 

            leukocyte_count_blood  unknown    2022   unknown    unknown    u

nknown

 

            erythrocyte_RBC_count  unknown    00835738   unknown    unknown    u

nknown

 

            Leukocytes_volume_in_B  unknown    15633419   unknown    unknown    

unknown



           lood_by_Automated_count                                             

 

            Glomerular_Filtration_  unknown    68970367   unknown    unknown    

unknown



           rate                                                   

 

            platelet_count  unknown    82956995   unknown    unknown    unknown

 

            hemoglobin_blood  unknown    37168532   unknown    unknown    unknow

n

 

            hematocrit_blood  unknown    25794331   unknown    unknown    unknow

n

 

            potassium_blood  unknown    13907570   unknown    unknown    unknown

 

            WBC_urine_on_microscop  unknown    67498113   unknown    unknown    

unknown



           y                                                      

 

            Urobilinogen_Presence_  unknown    16429462   unknown    unknown    

unknown



           in_Urine_by_Test_strip                                             

 

            Specific_gravity_of_Ur  unknown    94187275   unknown    unknown    

unknown



           ine_by_Test_strip                                             

 

            Nitrite_Presence_in_Ur  unknown    89436747   unknown    unknown    

unknown



           ine_by_Test_strip                                             

 

            Leukocyte_esterase_Pre  unknown    85848544   unknown    unknown    

unknown



           sence_in_Urine_by_Test_                                             



           strip                                                  

 

            Ketones_Mass_volume_in  unknown    89158165   unknown    unknown    

unknown



           _Urine_by_Test_strip                                             

 

            Color_of_Urine  unknown    67004506   unknown    unknown    unknown

 

            Bilirubin.total_Presen  unknown    2022   unknown    unknown    

unknown



           ce_in_Urine_by_Test_str                                             



           ip                                                     

 

            clarity_urine_point  unknown    87823713   unknown    unknown    unk

nown

 

            pH_study_of_acidity  unknown    26720946   unknown    unknown    unk

nown

 

           Glomerular_filtration_r  unknown    81435772   unknown    unknown    

unknown



           ate_1.73_sq_M.predicted                                             



           _among_non-blacks_Volum                                             



           e_Rate_Area_in_Serum_Pl                                             



           asma_or_Blood_by_Creati                                             



           nine-based_formula_MDRD                                             



           _                                                      

 

            Hematocrit_Volume_Frac  unknown    2022   unknown    unknown    

unknown



           tion_of_Blood_by_Automa                                             



           ted_count                                              

 

            bilirubin_serum_total  unknown    2022   unknown    unknown    u

nknown

 

            alanine_aminotransfera  unknown    2022   unknown    unknown    

unknown



           se_SGPT_serum                                             

 

            carbon_dioxide_serum_t  unknown    2022   unknown    unknown    

unknown



           otal                                                   

 

            aspartate_aminotransfe  unknown    2022   unknown    unknown    

unknown



           rase_SGOT_serum                                             

 

            protein_total_serum  unknown    2022   unknown    unknown    unk

nown

 

            blood_glucose  unknown    2022   unknown    unknown    unknown

 

            potassium_blood  unknown    2022   unknown    unknown    unknown

 

            glucose_urine  unknown    2022   unknown    unknown    unknown

 

            leukocyte_esterase_uri  unknown    2022   unknown    unknown    

unknown



           ne_by_dipstick                                             

 

            urobilinogen_urine_sem  unknown    33251696   unknown    unknown    

unknown



           iquantitative_dipstick_                                             

 

            specific_gravity_urine  unknown    2022   unknown    unknown    

unknown

 

            nitrite_urine_semiquan  unknown    2022   unknown    unknown    

unknown



           titative                                               

 

            ketones_urine_by_test_  unknown    2022   unknown    unknown    

unknown



           strip                                                  

 

            magnesium_serum  unknown    15001614   unknown    unknown    unknown

 

            bilirubin_urine  unknown    30732906   unknown    unknown    unknown

 

            mean_corpuscular_volum  unknown    48500809   unknown    unknown    

unknown



           e_RBC                                                  

 

            Urea_nitrogen_Mass_vol  unknown    14352129   unknown    unknown    

unknown



           ume_in_Serum_or_Plasma                                             

 

            globulin_serum  unknown    2022   unknown    unknown    unknown

 

            alkaline_phosphatase_s  unknown    50749576   unknown    unknown    

unknown



           vanessa                                                   

 

            Sodium_Moles_volume_in  unknown    35466058   unknown    unknown    

unknown



           _Serum_or_Plasma                                             

 

            Protein_Mass_volume_in  unknown    69153899   unknown    unknown    

unknown



           _Serum_or_Plasma                                             

 

            eosinophil_count_blood  unknown    87001256   unknown    unknown    

unknown

 

            anion_gap_serum  unknown    83710113   unknown    unknown    unknown

 

            mean_platelet_volume  unknown    47958418   unknown    unknown    un

known

 

            urine_color  unknown    85551992   unknown    unknown    unknown

 

            Magnesium_Moles_volume  unknown    03049825   unknown    unknown    

unknown



           _in_Serum_or_Plasma                                             

 

            basophil_count_blood  unknown    59786918   unknown    unknown    un

known

 

            monocyte_count_blood  unknown    33463988   unknown    unknown    un

known

 

            lymphocyte_count_blood  unknown    17388311   unknown    unknown    

unknown

 

            neutrophil_count_blood  unknown    13537080   unknown    unknown    

unknown

 

            Glucose_Mass_volume_in  unknown    2022   unknown    unknown    

unknown



           _Urine                                                 

 

            Glucose_Mass_volume_in  unknown    2022   unknown    unknown    

unknown



           _Serum_or_Plasma                                             

 

            Globulin_Mass_volume_i  unknown    2022   unknown    unknown    

unknown



           n_Serum                                                

 

            Creatinine_Mass_volume  unknown    2022   unknown    unknown    

unknown



           _in_Serum_or_Plasma                                             

 

            Chloride_Moles_volume_  unknown    2022   unknown    unknown    

unknown



           in_Serum_or_Plasma                                             

 

            carbon_dioxide_serum_t  unknown    2022   unknown    unknown    

unknown



           otal                                                   

 

            Calcium_Moles_volume_i  unknown    2022   unknown    unknown    

unknown



           n_Serum_or_Plasma                                             

 

            albumin_serum  unknown    2022   unknown    unknown    unknown

 

            Bilirubin.total_Mass_v  unknown    2022   unknown    unknown    

unknown



           olume_in_Serum_or_Plasm                                             



           a                                                      

 

            Aspartate_aminotransfe  unknown    2022   unknown    unknown    

unknown



           rase_Enzymatic_activity                                             



           _volume_in_Serum_or_Pla                                             



           sma                                                    

 

            Anion_gap_4_in_Serum_o  unknown    2022   unknown    unknown    

unknown



           r_Plasma                                               

 

            creatinine_serum  unknown    2022   unknown    unknown    unknow

n

 

            Alkaline_phosphatase_E  unknown    2022   unknown    unknown    

unknown



           nzymatic_activity_volum                                             



           e_in_Blood                                             

 

            Albumin_Globulin_Mass_  unknown    2022   unknown    unknown    

unknown



           Ratio_in_Serum_or_Plasm                                             



           a                                                      

 

            Albumin_Mass_volume_in  unknown    2022   unknown    unknown    

unknown



           _Serum_or_Plasma                                             

 

            Alanine_aminotransfera  unknown    2022   unknown    unknown    

unknown



           se_Enzymatic_activity_v                                             



           olume_in_Serum_or_Plasm                                             



           a                                                      

 

            mean_corpuscular_hemog  unknown    2022   unknown    unknown    

unknown



           lobin_concentration_rbc                                             

 

            sodium_serum  unknown    2022   unknown    unknown    unknown

 

            albumin_globulin_ratio  unknown    2022   unknown    unknown    

unknown



           _serum                                                 

 

            chloride_serum  unknown    2022   unknown    unknown    unknown

 

            calcium_serum  unknown    2022   unknown    unknown    unknown

 

            mean_corpuscular_hemog  unknown    2022   unknown    unknown    

unknown



           lobin_RBC                                              

 

            red_blood_cell_distrib  unknown    2022   unknown    unknown    

unknown



           ution_width                                             

 

            WBC_urine_on_microscop  unknown    2022   unknown    unknown    

unknown



           y                                                      

 

            T          unknown    2022   unknown    unknown    unknown

 

            WBC_URINE  unknown    2022   unknown    unknown    unknown

 

            UROBILINOGEN_URINE  unknown    2022   unknown    unknown    unkn

own

 

            SPECIFIC_GRAVITY_URINE  unknown    2022   unknown    unknown    

unknown

 

            T          unknown    26070617   unknown    unknown    unknown

 

            T          unknown    23894101   unknown    unknown    unknown

 

            T          unknown    [id number]   unknown    unknown    unknown

 

            T          unknown    [id number]   unknown    unknown    unknown

 

            T          unknown    [id number]   unknown    unknown    unknown

 

            T          unknown    [id number]   unknown    unknown    unknown

 

            T          unknown    [id number]   unknown    unknown    unknown

 

            T          unknown    25048149   unknown    unknown    unknown

 

            T          unknown    71829254   unknown    unknown    unknown

 

            T          unknown    17187035   unknown    unknown    unknown

 

            T          unknown    10323521   unknown    unknown    unknown

 

            PH_URINE   unknown    [id number]   unknown    unknown    unknown

 

            NITRITE_URINE  unknown    [id number]   unknown    unknown    unknown

 

            LEUKOCYTE_ESTERASE_URI  unknown    [id number]   unknown    unknown    

unknown



           NE                                                     

 

            KETONES_URINE_UA_  unknown    [id number]   unknown    unknown    unkno

wn

 

            GLUCOSE_URINE_UA_  unknown    [id number]   unknown    unknown    unkno

wn

 

            COLOR_URINE  unknown    [id number]   unknown    unknown    unknown

 

            CLARITY_URINE  unknown    [id number]   unknown    unknown    unknown

 

            BILIRUBIN_URINE  unknown    [id number]   unknown    unknown    unknown

 

            T          unknown    12484829   unknown    unknown    unknown

 

            T          unknown    [id number]   unknown    unknown    unknown

 

            T          unknown    39766836   unknown    unknown    unknown

 

            T          unknown    [id number]   unknown    unknown    unknown

 

            T          unknown    46870005   unknown    unknown    unknown

 

            NEUTROPHILS_AUTO_  unknown    [id number]   unknown    unknown    unkno

wn

 

            T          unknown    38180199   unknown    unknown    unknown

 

            T          unknown    06197013   unknown    unknown    unknown

 

            T          unknown    05808013   unknown    unknown    unknown

 

            MONOCYTES_AUTO_  unknown    [id number]   unknown    unknown    unknown

 

            T          unknown    27368300   unknown    unknown    unknown

 

            T          unknown    38898797   unknown    unknown    unknown

 

            T          unknown    97006089   unknown    unknown    unknown

 

            T          unknown    95164453   unknown    unknown    unknown

 

            T          unknown    56579123   unknown    unknown    unknown

 

            LYMPHOCYTES_AUTO_  unknown    [id number]   unknown    unknown    unkno

wn

 

            T          unknown    54398427   unknown    unknown    unknown

 

            T          unknown    40221447   unknown    unknown    unknown

 

            T          unknown    24541809   unknown    unknown    unknown

 

            T          unknown    52050130   unknown    unknown    unknown

 

            T          unknown    07115754   unknown    unknown    unknown

 

            T          unknown    94902861   unknown    unknown    unknown

 

            T          unknown    74046070   unknown    unknown    unknown

 

            GFR_-_MDRD  unknown    23890627   unknown    unknown    unknown

 

            T          unknown    78674875   unknown    unknown    unknown

 

            EOSINOPHILS_AUTO_  unknown    [id number]   unknown    unknown    unkno

wn

 

            T          unknown    96836242   unknown    unknown    unknown

 

            T          unknown    44747569   unknown    unknown    unknown

 

            T          unknown    03140919   unknown    unknown    unknown

 

            T          unknown    02609106   unknown    unknown    unknown

 

            T          unknown    07111478   unknown    unknown    unknown

 

            T          unknown    35981290   unknown    unknown    unknown

 

            BILIRUBIN_TOTAL  unknown    99468855   unknown    unknown    unknown

 

            BASOPHILS_AUTO_  unknown    01085476   unknown    unknown    unknown

 

            T          unknown    26497083   unknown    unknown    unknown

 

            T          unknown    78026250   unknown    unknown    unknown

 

            T          unknown    45351329   unknown    unknown    unknown

 

            T          unknown    41145460   unknown    unknown    unknown

 

            ALT_ALANINE_AMINOTRANS  unknown    2022   unknown    unknown    

unknown



           FERASE                                                 

 

            ALKALINE_PHOSPHATASE  unknown    2022   unknown    unknown    un

known

 

            T          unknown    2022   unknown    unknown    unknown

 

            T          unknown    2022   unknown    unknown    unknown

 

            ALBUMIN_GLOBULIN_RATIO  unknown    2022   unknown    unknown    

unknown









         facility  observation  status  value   reference  units   lab code  abn

ormal  

line



                                        range                           notes

 

         All     urea_nitroge  unknown  17      unknown  mg/dL   _9      unknown

  unknown



                n_blood                                                 

 

         All     Erythrocytes  unknown  3.85 10  unknown          _789-8  unknow

n  unknown



                _volume_in_Bl         6/UL                                    



                ood_by_Automa                                                 



                ted_count                                                 

 

         All     Erythrocyte_  unknown  14.8    unknown  %       _788-0  unknown

  unknown



                distribution_                                                 



                width_Ratio_b                                                 



                y_Automated_c                                                 



                ount                                                    

 

         All     MCV_Entitic_  unknown  95.3    unknown  fL      _787-2  unknown

  unknown



                volume_by_Aut                                                 



                omated_count                                                 

 

         All     MCH_Entitic_  unknown  30.9    unknown  pg      _785-6  unknown

  unknown



                mass_by_Autom                                                 



                ated_count                                                 

 

         All     Platelets_vo  unknown  226 10  unknown          _777-3  unknown

  unknown



                lume_in_Blood         3/UL                                    



                _by_Automated                                                 



                _count                                                  

 

         All     Platelet_mea  unknown  8.8     unknown  fL      _776-5  unknown

  unknown



                n_volume_Enti                                                 



                tic_volume_in                                                 



                _Blood_by_Ree                                                 



                s-Sharon                                                 

 

         All     neutrophil_c  unknown  5.2 10  unknown          _752-6  unknown

  unknown



                ount_blood         3/UL                                    

 

         All     Hemoglobin_M  unknown  11.9    unknown  g/dL    _718-7  unknown

  unknown



                ass_volume_in                                                 



                _Blood                                                  

 

         All     leukocyte_co  unknown  7.3 X10  unknown          _68     unknow

n  unknown



                unt_blood         3/UL                                    

 

         All     erythrocyte_  unknown  3.85 10  unknown          _67     unknow

n  unknown



                RBC_count         6/UL                                    

 

         All     Leukocytes_v  unknown  7.3 X10  unknown          _6690-2  unkno

wn  unknown



                olume_in_Bloo         3/UL                                    



                d_by_Automate                                                 



                d_count                                                 

 

         All     Glomerular_F  unknown  61      unknown  mL/mi   _66455  unknown

  unknown



                iltration_rat                         n                       



                e                                                       

 

         All     platelet_cou  unknown  226 10  unknown          _66     unknown

  unknown



                nt              3/UL                                    

 

         All     hemoglobin_b  unknown  11.9    unknown  g/dL    _65     unknown

  unknown



                lood                                                    

 

         All     hematocrit_b  unknown  36.7    unknown  %       _64     unknown

  unknown



                lood                                                    

 

         All     potassium_bl  unknown  4.1     unknown  meq/L   _6298-4  unknow

n  unknown



                ood                                                     

 

         All    Glomerular_fi  unknown  61      unknown  mL/mi   _48642-3  unkno

wn  unknown



                ltration_rate                         n                       



                _1.73_sq_M.pr                                                 



                edicted_among                                                 



                _non-blacks_V                                                 



                olume_Rate_Ar                                                 



                ea_in_Serum_P                                                 



                lasma_or_Bloo                                                 



                d_by_Creatini                                                 



                ne-based_form                                                 



                ula_MDRD_                                                 

 

         All     Hematocrit_V  unknown  36.7    unknown  %       _4544-3  unknow

n  unknown



                olume_Fractio                                                 



                n_of_Blood_by                                                 



                _Automated_co                                                 



                unt                                                     

 

         All     bilirubin_se  unknown  0.7     unknown  mg/dL   _43     unknown

  unknown



                rum_total                                                 

 

         All     alanine_amin  unknown  < 10 IU/L  unknown          _40     unkn

own  unknown



                otransferase_                                                 



                SGPT_serum                                                 

 

         All     carbon_dioxi  unknown  27      unknown  mmol/   _3962   unknown

  unknown



                de_serum_tota                         L                       



                l                                                       

 

         All     aspartate_am  unknown  15      unknown  U/L     _39     unknown

  unknown



                inotransferas                                                 



                e_SGOT_serum                                                 

 

         All     protein_tota  unknown  6.6     unknown  g/dL    _36     unknown

  unknown



                l_serum                                                 

 

         All     blood_glucos  unknown  85      unknown  mg/dL   _3565   unknown

  unknown



                e                                                       

 

         All     potassium_bl  unknown  4.1     unknown  meq/L   _3483   unknown

  unknown



                ood                                                     

 

         All     mean_corpusc  unknown  95.3    unknown  fL      _315    unknown

  unknown



                ular_volume_R                                                 



                BC                                                      

 

         All     Urea_nitroge  unknown  17      unknown  mg/dL   _3094-0  unknow

n  unknown



                n_Mass_volume                                                 



                _in_Serum_or_                                                 



                Plasma                                                  

 

         All     globulin_ser  unknown  2.7     unknown          _3059   unknown

  unknown



                um                                                      

 

         All     alkaline_pho  unknown  97      unknown  U/L     _3      unknown

  unknown



                sphatase_seru                                                 



                m                                                       

 

         All     Sodium_Moles  unknown  141     unknown  mmol/   _2951-2  unknow

n  unknown



                _volume_in_Se                         L                       



                rum_or_Plasma                                                 

 

         All     Protein_Mass  unknown  6.6     unknown  g/dL    _2885-2  unknow

n  unknown



                _volume_in_Se                                                 



                rum_or_Plasma                                                 

 

         All     anion_gap_se  unknown  9.0     unknown          _279    unknown

  unknown



                rum                                                     

 

         All     mean_platele  unknown  8.8     unknown  fL      _2784   unknown

  unknown



                t_volume                                                 

 

         All     neutrophil_c  unknown  5.2 10  unknown          _2418   unknown

  unknown



                ount_blood         3/UL                                    

 

         All     Glucose_Mass  unknown  85      unknown  mg/dL   _2345-7  unknow

n  unknown



                _volume_in_Se                                                 



                rum_or_Plasma                                                 

 

         All     Globulin_Mas  unknown  2.7     unknown          _2336-6  unknow

n  unknown



                s_volume_in_S                                                 



                vanessa                                                    

 

         All     Creatinine_M  unknown  0.9     unknown  mg/dL   _2160-0  unknow

n  unknown



                ass_volume_in                                                 



                _Serum_or_Pla                                                 



                sma                                                     

 

         All     Chloride_Mol  unknown  105     unknown  mmol/   _5-0  unknow

n  unknown



                es_volume_in_                         L                       



                Serum_or_Plas                                                 



                ma                                                      

 

         All     carbon_dioxi  unknown  27      unknown  mmol/   _-9  unknow

n  unknown



                de_serum_tota                         L                       



                l                                                       

 

         All     Calcium_Mole  unknown  8.7     unknown  mg/dL   _-  unknow

n  unknown



                s_volume_in_S                                                 



                erum_or_Plasm                                                 



                a                                                       

 

         All     albumin_seru  unknown  3.9     unknown  g/dL    _2      unknown

  unknown



                m                                                       

 

         All     Bilirubin.to  unknown  0.7     unknown  mg/dL   _-  unknow

n  unknown



                tal_Mass_volu                                                 



                me_in_Serum_o                                                 



                r_Plasma                                                 

 

         All     Aspartate_am  unknown  15      unknown  U/L     _-8  unknow

n  unknown



                inotransferas                                                 



                e_Enzymatic_a                                                 



                ctivity_volum                                                 



                e_in_Serum_or                                                 



                _Plasma                                                 

 

         All     Anion_gap_4_  unknown  9.0     unknown          _1863-0  unknow

n  unknown



                in_Serum_or_P                                                 



                lasma                                                   

 

         All     creatinine_s  unknown  0.9     unknown  mg/dL   _18     unknown

  unknown



                vanessa                                                    

 

         All     Alkaline_pho  unknown  97      unknown  U/L     _1783-0  unknow

n  unknown



                sphatase_Enzy                                                 



                matic_activit                                                 



                y_volume_in_B                                                 



                lood                                                    

 

         All     Albumin_Glob  unknown  1.4     unknown          _1759-0  unknow

n  unknown



                ulin_Mass_Rat                                                 



                io_in_Serum_o                                                 



                r_Plasma                                                 

 

         All     Albumin_Mass  unknown  3.9     unknown  g/dL    _1751-7  unknow

n  unknown



                _volume_in_Se                                                 



                rum_or_Plasma                                                 

 

         All     Alanine_amin  unknown  < 10 IU/L  unknown          _1742-6  unk

nown  unknown



                otransferase_                                                 



                Enzymatic_act                                                 



                ivity_volume_                                                 



                in_Serum_or_P                                                 



                lasma                                                   

 

         All     mean_corpusc  unknown  32.4    unknown  g/dL    _17029  unknown

  unknown



                ular_hemoglob                                                 



                in_concentrat                                                 



                ion_rbc                                                 

 

         All     sodium_serum  unknown  141     unknown  mmol/   _159    unknown

  unknown



                                                L                       

 

         All     albumin_glob  unknown  1.4     unknown          _146    unknown

  unknown



                ulin_ratio_se                                                 



                rum                                                     

 

         All     chloride_ser  unknown  105     unknown  mmol/   _13     unknown

  unknown



                um                              L                       

 

         All     calcium_seru  unknown  8.7     unknown  mg/dL   _11     unknown

  unknown



                m                                                       

 

         All     mean_corpusc  unknown  30.9    unknown  pg      _1031   unknown

  unknown



                ular_hemoglob                                                 



                in_RBC                                                  

 

         All     red_blood_ce  unknown  14.8    unknown  %       _1030   unknown

  unknown



                ll_distributi                                                 



                on_width                                                 

 

         All     T       unknown  7.3 X10  unknown          WBC     unknown  unk

nown



                                3/UL                                    

 

         All     T       unknown  0.7     unknown  mg/dL   TOTAL_BI  unknown  un

known



                                                        LI              

 

         All     T       unknown  14.8    unknown  %       RDW     unknown  unkn

own

 

         All     T       unknown  3.85 10  unknown          RBC     unknown  unk

nown



                                6/UL                                    

 

         All     T       unknown  6.6     unknown  g/dL    PRO_TOTA  unknown  un

known



                                                        L               

 

         All     T       unknown  226 10  unknown          PLT     unknown  unkn

own



                                3/UL                                    

 

         All     NEUTROPHILS_  unknown  5.2 10  unknown          NE_     unknown

  unknown



                AUTO_           3/UL                                    

 

         All     T       unknown  5.2 10  unknown          NEUT_AUT  unknown  un

known



                                3/UL                    O_              

 

         All     T       unknown  141     unknown  mmol/   NA      unknown  unkn

own



                                                L                       

 

         All     T       unknown  8.8     unknown  fL      MPV     unknown  unkn

own

 

         All     T       unknown  95.3    unknown  fL      MCV     unknown  unkn

own

 

         All     T       unknown  32.4    unknown  g/dL    MCHC    unknown  unkn

own

 

         All     T       unknown  30.9    unknown  pg      MCH     unknown  unkn

own

 

         All     T       unknown  4.1     unknown  meq/L   K       unknown  unkn

own

 

         All     T       unknown  11.9    unknown  g/dL    HGB     unknown  unkn

own

 

         All     T       unknown  36.7    unknown  %       HCT     unknown  unkn

own

 

         All     T       unknown  85      unknown  mg/dL   GLU     unknown  unkn

own

 

         All     T       unknown  2.7     unknown          GLOB    unknown  unkn

own

 

         All     T       unknown  61      unknown  mL/mi   GFR_-_MD  unknown  un

known



                                                n       RD              

 

         All     GFR_-_MDRD  unknown  61      unknown  mL/mi   GFR     unknown  

unknown



                                                n                       

 

         All     T       unknown  9.0     unknown          GAP     unknown  unkn

own

 

         All     T       unknown  0.9     unknown  mg/dL   CREAT   unknown  unkn

own

 

         All     T       unknown  27      unknown  mmol/   CO2     unknown  unkn

own



                                                L                       

 

         All     T       unknown  105     unknown  mmol/   CL      unknown  unkn

own



                                                L                       

 

         All     T       unknown  8.7     unknown  mg/dL   CA      unknown  unkn

own

 

         All     T       unknown  17      unknown  mg/dL   BUN     unknown  unkn

own

 

         All     BILIRUBIN_TO  unknown  0.7     unknown  mg/dL   BILIT   unknown

  unknown



                CONNIE                                                     

 

         All     T       unknown  1.4     unknown          A_G_RATI  unknown  un

known



                                                        O               

 

         All     T       unknown  15      unknown  U/L     AST     unknown  unkn

own

 

         All     T       unknown  < 10 IU/L  unknown          ALT_SGPT  unknown 

 unknown



                                                        _               

 

         All     ALT_ALANINE_  unknown  < 10 IU/L  unknown          ALT     unkn

own  unknown



                AMINOTRANSFER                                                 



                ASE                                                     

 

         All     ALKALINE_PHO  unknown  97      unknown  U/L     ALP     unknown

  unknown



                SPHATASE                                                 

 

         All     T       unknown  97      unknown  U/L     ALK_PHOS  unknown  un

known

 

         All     T       unknown  3.9     unknown  g/dL    ALB     unknown  unkn

own

 

         All     ALBUMIN_GLOB  unknown  1.4     unknown          AGRATIO  unknow

n  unknown



                ULIN_RATIO                                                 

 

         All     urea_nitroge  unknown  15      unknown  mg/dL   _9      unknown

  unknown



                n_blood                                                 

 

         All     Erythrocytes  unknown  3.75 10  unknown          _789-8  unknow

n  unknown



                _volume_in_Bl         6/UL                                    



                ood_by_Automa                                                 



                ted_count                                                 

 

         All     Erythrocyte_  unknown  14.7    unknown  %       _788-0  unknown

  unknown



                distribution_                                                 



                width_Ratio_b                                                 



                y_Automated_c                                                 



                ount                                                    

 

         All     MCV_Entitic_  unknown  95.5    unknown  fL      _787-2  unknown

  unknown



                volume_by_Aut                                                 



                omated_count                                                 

 

         All     MCH_Entitic_  unknown  30.9    unknown  pg      _785-6  unknown

  unknown



                mass_by_Autom                                                 



                ated_count                                                 

 

         All     Platelets_vo  unknown  194 10  unknown          _777-3  unknown

  unknown



                lume_in_Blood         3/UL                                    



                _by_Automated                                                 



                _count                                                  

 

         All     Platelet_mea  unknown  9.3     unknown  fL      _776-5  unknown

  unknown



                n_volume_Enti                                                 



                tic_volume_in                                                 



                _Blood_by_Ree                                                 



                s-Sharon                                                 

 

         All     neutrophil_c  unknown  4.9 10  unknown          _752-6  unknown

  unknown



                ount_blood         3/UL                                    

 

         All     monocyte_cou  unknown  0.6 10  unknown          _743-5  unknown

  unknown



                nt_blood         3/UL                                    

 

         All     lymphocyte_c  unknown  1.1 10  unknown          _732-8  unknown

  unknown



                ount_blood         3/UL                                    

 

         All     Hemoglobin_M  unknown  11.6    unknown  g/dL    _718-7  unknown

  unknown



                ass_volume_in                                                 



                _Blood                                                  

 

         All     eosinophil_c  unknown  0.1 10  unknown          _712-0  unknown

  unknown



                ount_blood         3/UL                                    

 

         All     Basophils_vo  unknown  0.1 10  unknown          _705-4  unknown

  unknown



                lume_in_Blood         3/UL                                    



                _by_Manual_co                                                 



                unt                                                     

 

         All     leukocyte_co  unknown  6.9 X10  unknown          _68     unknow

n  unknown



                unt_blood         3/UL                                    

 

         All     erythrocyte_  unknown  3.75 10  unknown          _67     unknow

n  unknown



                RBC_count         6/UL                                    

 

         All     Leukocytes_v  unknown  6.9 X10  unknown          _6690-2  unkno

wn  unknown



                olume_in_Bloo         3/UL                                    



                d_by_Automate                                                 



                d_count                                                 

 

         All     Glomerular_F  unknown  81      unknown  mL/mi   _66455  unknown

  unknown



                iltration_rat                         n                       



                e                                                       

 

         All     platelet_cou  unknown  194 10  unknown          _66     unknown

  unknown



                nt              3/UL                                    

 

         All     hemoglobin_b  unknown  11.6    unknown  g/dL    _65     unknown

  unknown



                lood                                                    

 

         All     hematocrit_b  unknown  35.8    unknown  %       _64     unknown

  unknown



                lood                                                    

 

         All     INR_in_Plate  unknown  1.1     unknown          _6301-6  unknow

n  unknown



                let_poor_plas                                                 



                ma_by_Coagula                                                 



                tion_assay                                                 

 

         All     potassium_bl  unknown  4.4     unknown  meq/L   _6298-4  unknow

n  unknown



                ood                                                     

 

         All     Prothrombin_  unknown  12.8 SECS  unknown          _5902-2  unk

nown  unknown



                time_PT_                                                 

 

         All     WBC_urine_on  unknown  4-5 /HPF  unknown          _5821-4  unkn

own  unknown



                _microscopy                                                 

 

         All     Urobilinogen  unknown  0.2     unknown          _5818-0  unknow

n  unknown



                _Presence_in_         (NORMAL)                                 



                Urine_by_Test                                                 



                _strip                                                  

 

         All     Specific_gra  unknown  1.020   unknown          _5811-5  unknow

n  unknown



                vity_of_Urine                                                 



                _by_Test_stri                                                 



                p                                                       

 

         All     Nitrite_Pres  unknown  NEGATIVE  unknown          _5802-4  unkn

own  unknown



                ence_in_Urine                                                 



                _by_Test_stri                                                 



                p                                                       

 

         All     Leukocyte_es  unknown  TRACE   unknown          _5799-2  unknow

n  unknown



                terase_Presen                                                 



                ce_in_Urine_b                                                 



                y_Test_strip                                                 

 

         All     Ketones_Mass  unknown  NEGATIVE  unknown          _5797-6  unkn

own  unknown



                _volume_in_Ur                                                 



                ine_by_Test_s                                                 



                trip                                                    

 

         All     Color_of_Uri  unknown  YELLOW  unknown          _5778-6  unknow

n  unknown



                ne                                                      

 

         All     Bilirubin.to  unknown  NEGATIVE  unknown          _5770-3  unkn

own  unknown



                tal_Presence_                                                 



                in_Urine_by_T                                                 



                est_strip                                                 

 

         All     clarity_urin  unknown  CLEAR   unknown          _5589   unknown

  unknown



                e_point                                                 

 

         All     pH_study_of_  unknown  7.0     unknown          _51641  unknown

  unknown



                acidity                                                 

 

         All     prothrombin_  unknown  12.8 SECS  unknown          _50     unkn

own  unknown



                time_patient_                                                 

 

         All    Glomerular_fi  unknown  81      unknown  mL/mi   _48642-3  unkno

wn  unknown



                ltration_rate                         n                       



                _1.73_sq_M.pr                                                 



                edicted_among                                                 



                _non-blacks_V                                                 



                olume_Rate_Ar                                                 



                ea_in_Serum_P                                                 



                lasma_or_Bloo                                                 



                d_by_Creatini                                                 



                ne-based_form                                                 



                ula_MDRD_                                                 

 

         All     Hematocrit_V  unknown  35.8    unknown  %       _4544-3  unknow

n  unknown



                olume_Fractio                                                 



                n_of_Blood_by                                                 



                _Automated_co                                                 



                unt                                                     

 

         All     bilirubin_se  unknown  0.8     unknown  mg/dL   _43     unknown

  unknown



                rum_total                                                 

 

         All     alanine_amin  unknown  < 10 IU/L  unknown          _40     unkn

own  unknown



                otransferase_                                                 



                SGPT_serum                                                 

 

         All     carbon_dioxi  unknown  26      unknown  mmol/   _3962   unknown

  unknown



                de_serum_tota                         L                       



                l                                                       

 

         All     aspartate_am  unknown  14      unknown  U/L     _39     unknown

  unknown



                inotransferas                                                 



                e_SGOT_serum                                                 

 

         All     protein_tota  unknown  6.5     unknown  g/dL    _36     unknown

  unknown



                l_serum                                                 

 

         All     blood_glucos  unknown  94      unknown  mg/dL   _3565   unknown

  unknown



                e                                                       

 

         All     potassium_bl  unknown  4.4     unknown  meq/L   _3483   unknown

  unknown



                ood                                                     

 

         All     glucose_urin  unknown  NEGATIVE  unknown          _3369   unkno

wn  unknown



                e               mg/dL                                   

 

         All     leukocyte_es  unknown  TRACE   unknown          _327    unknown

  unknown



                terase_urine_                                                 



                by_dipstick                                                 

 

         All     urobilinogen  unknown  0.2     unknown          _326    unknown

  unknown



                _urine_semiqu         (NORMAL)                                 



                antitative_di                                                 



                pstick_                                                 

 

         All     specific_gra  unknown  1.020   unknown          _325    unknown

  unknown



                vity_urine                                                 

 

         All     nitrite_urin  unknown  NEGATIVE  unknown          _323    unkno

wn  unknown



                e_semiquantit                                                 



                ative                                                   

 

         All     ketones_urin  unknown  NEGATIVE  unknown          _322    unkno

wn  unknown



                e_by_test_str                                                 



                ip                                                      

 

         All     bilirubin_ur  unknown  NEGATIVE  unknown          _319    unkno

wn  unknown



                ine                                                     

 

         All     mean_corpusc  unknown  95.5    unknown  fL      _315    unknown

  unknown



                ular_volume_R                                                 



                BC                                                      

 

         All     Urea_nitroge  unknown  15      unknown  mg/dL   _3094-0  unknow

n  unknown



                n_Mass_volume                                                 



                _in_Serum_or_                                                 



                Plasma                                                  

 

         All     internationa  unknown  1.1     unknown          _309    unknown

  unknown



                l_normalized_                                                 



                ratio_INR_                                                 

 

         All     globulin_ser  unknown  2.7     unknown          _3059   unknown

  unknown



                um                                                      

 

         All     alkaline_pho  unknown  97      unknown  U/L     _3      unknown

  unknown



                sphatase_seru                                                 



                m                                                       

 

         All     Sodium_Moles  unknown  138     unknown  mmol/   _2951-2  unknow

n  unknown



                _volume_in_Se                         L                       



                rum_or_Plasma                                                 

 

         All     Protein_Mass  unknown  6.5     unknown  g/dL    _2885-2  unknow

n  unknown



                _volume_in_Se                                                 



                rum_or_Plasma                                                 

 

         All     eosinophil_c  unknown  0.1 10  unknown          _285    unknown

  unknown



                ount_blood         3/UL                                    

 

         All     anion_gap_se  unknown  9.0     unknown          _279    unknown

  unknown



                rum                                                     

 

         All     mean_platele  unknown  9.3     unknown  fL      _2784   unknown

  unknown



                t_volume                                                 

 

         All     urine_color  unknown  YELLOW  unknown          _2751   unknown 

 unknown

 

         All     basophil_cou  unknown  0.1 10  unknown          _2427   unknown

  unknown



                nt_blood         3/UL                                    

 

         All     monocyte_cou  unknown  0.6 10  unknown          _2422   unknown

  unknown



                nt_blood         3/UL                                    

 

         All     lymphocyte_c  unknown  1.1 10  unknown          _2420   unknown

  unknown



                ount_blood         3/UL                                    

 

         All     neutrophil_c  unknown  4.9 10  unknown          _2418   unknown

  unknown



                ount_blood         3/UL                                    

 

         All     Glucose_Mass  unknown  NEGATIVE  unknown          _2350-7  unkn

own  unknown



                _volume_in_Ur         mg/dL                                   



                ine                                                     

 

         All     Glucose_Mass  unknown  94      unknown  mg/dL   _2345-7  unknow

n  unknown



                _volume_in_Se                                                 



                rum_or_Plasma                                                 

 

         All     Globulin_Mas  unknown  2.7     unknown          _2336-6  unknow

n  unknown



                s_volume_in_S                                                 



                vanessa                                                    

 

         All     Creatinine_M  unknown  0.7     unknown  mg/dL   _2160-0  unknow

n  unknown



                ass_volume_in                                                 



                _Serum_or_Pla                                                 



                sma                                                     

 

         All     Chloride_Mol  unknown  103     unknown  mmol/   _2075-0  unknow

n  unknown



                es_volume_in_                         L                       



                Serum_or_Plas                                                 



                ma                                                      

 

         All     carbon_dioxi  unknown  26      unknown  mmol/   _2028-9  unknow

n  unknown



                de_serum_tota                         L                       



                l                                                       

 

         All     Calcium_Mole  unknown  8.7     unknown  mg/dL   _-8  unknow

n  unknown



                s_volume_in_S                                                 



                erum_or_Plasm                                                 



                a                                                       

 

         All     albumin_seru  unknown  3.8     unknown  g/dL    _2      unknown

  unknown



                m                                                       

 

         All     Bilirubin.to  unknown  0.8     unknown  mg/dL   _-  unknow

n  unknown



                tal_Mass_volu                                                 



                me_in_Serum_o                                                 



                r_Plasma                                                 

 

         All     Aspartate_am  unknown  14      unknown  U/L     _1920-8  unknow

n  unknown



                inotransferas                                                 



                e_Enzymatic_a                                                 



                ctivity_volum                                                 



                e_in_Serum_or                                                 



                _Plasma                                                 

 

         All     Anion_gap_4_  unknown  9.0     unknown          _1863-0  unknow

n  unknown



                in_Serum_or_P                                                 



                lasma                                                   

 

         All     creatinine_s  unknown  0.7     unknown  mg/dL   _18     unknown

  unknown



                vanessa                                                    

 

         All     Alkaline_pho  unknown  97      unknown  U/L     _1783-0  unknow

n  unknown



                sphatase_Enzy                                                 



                matic_activit                                                 



                y_volume_in_B                                                 



                lood                                                    

 

         All     Albumin_Glob  unknown  1.4     unknown          _1759-0  unknow

n  unknown



                ulin_Mass_Rat                                                 



                io_in_Serum_o                                                 



                r_Plasma                                                 

 

         All     Albumin_Mass  unknown  3.8     unknown  g/dL    _1751-7  unknow

n  unknown



                _volume_in_Se                                                 



                rum_or_Plasma                                                 

 

         All     Alanine_amin  unknown  < 10 IU/L  unknown          _1742-6  unk

nown  unknown



                otransferase_                                                 



                Enzymatic_act                                                 



                ivity_volume_                                                 



                in_Serum_or_P                                                 



                lasma                                                   

 

         All     mean_corpusc  unknown  32.4    unknown  g/dL    _17029  unknown

  unknown



                ular_hemoglob                                                 



                in_concentrat                                                 



                ion_rbc                                                 

 

         All     sodium_serum  unknown  138     unknown  mmol/   _159    unknown

  unknown



                                                L                       

 

         All     albumin_glob  unknown  1.4     unknown          _146    unknown

  unknown



                ulin_ratio_se                                                 



                rum                                                     

 

         All     chloride_ser  unknown  103     unknown  mmol/   _13     unknown

  unknown



                um                              L                       

 

         All     calcium_seru  unknown  8.7     unknown  mg/dL   _11     unknown

  unknown



                m                                                       

 

         All     mean_corpusc  unknown  30.9    unknown  pg      _1031   unknown

  unknown



                ular_hemoglob                                                 



                in_RBC                                                  

 

         All     red_blood_ce  unknown  14.7    unknown  %       _1030   unknown

  unknown



                ll_distributi                                                 



                on_width                                                 

 

         All     WBC_urine_on  unknown  4-5 /HPF  unknown          _1016   unkno

wn  unknown



                _microscopy                                                 

 

         All     T       unknown  6.9 X10  unknown          WBC     unknown  unk

nown



                                3/UL                                    

 

         All     WBC_URINE  unknown  4-5 /HPF  unknown          UWBC    unknown 

 unknown

 

         All     UROBILINOGEN  unknown  0.2     unknown          UUROBIL  unknow

n  unknown



                _URINE          (NORMAL)                                 

 

         All     SPECIFIC_GRA  unknown  1.020   unknown          [facility]     unknown

  unknown



                VITY_URINE                                                 

 

         All     T       unknown  4-5 /HPF  unknown          UR_WBC  unknown  un

known

 

         All     T       unknown  0.2     unknown          UR_URO  unknown  unkn

own



                                (NORMAL)                                 

 

         All     T       unknown  1.020   unknown          UR_SG   unknown  unkn

own

 

         All     T       unknown  7.0     unknown          UR_PH   unknown  unkn

own

 

         All     T       unknown  NEGATIVE  unknown          UR_NIT  unknown  un

known

 

         All     T       unknown  TRACE   unknown          UR_LEU_E  unknown  un

known



                                                        STERASE         

 

         All     T       unknown  NEGATIVE  unknown          UR_KETO_  unknown  

unknown



                                                        UA_             

 

         All     T       unknown  NEGATIVE  unknown          UR_GLU  unknown  un

known



                                mg/dL                                   

 

         All     T       unknown  YELLOW  unknown          UR_COLOR  unknown  un

known

 

         All     T       unknown  CLEAR   unknown          UR_CLARI  unknown  un

known



                                                        TY              

 

         All     T       unknown  NEGATIVE  unknown          UR_BILI  unknown  u

nknown

 

         All     PH_URINE  unknown  7.0     unknown          UPH     unknown  un

known

 

         All     NITRITE_URIN  unknown  NEGATIVE  unknown          UNITRITE  unk

nown  unknown



                E                                                       

 

         All     LEUKOCYTE_ES  unknown  TRACE   unknown          ULEUK   unknown

  unknown



                TERASE_URINE                                                 

 

         All     KETONES_URIN  unknown  NEGATIVE  unknown          UKET    unkno

wn  unknown



                E_UA_                                                   

 

         All     GLUCOSE_URIN  unknown  NEGATIVE  unknown          UGLUC   unkno

wn  unknown



                E_UA_           mg/dL                                   

 

         All     COLOR_URINE  unknown  YELLOW  unknown          UCOL    unknown 

 unknown

 

         All     CLARITY_URIN  unknown  CLEAR   unknown          UCLAR   unknown

  unknown



                E                                                       

 

         All     BILIRUBIN_UR  unknown  NEGATIVE  unknown          UBIL    unkno

wn  unknown



                INE                                                     

 

         All     T       unknown  0.8     unknown  mg/dL   TOTAL_BI  unknown  un

known



                                                        LI              

 

         All     T       unknown  14.7    unknown  %       RDW     unknown  unkn

own

 

         All     T       unknown  3.75 10  unknown          RBC     unknown  unk

nown



                                6/UL                                    

 

         All     T       unknown  12.8 SECS  unknown          PT      unknown  u

nknown

 

         All     T       unknown  6.5     unknown  g/dL    PRO_TOTA  unknown  un

known



                                                        L               

 

         All     T       unknown  194 10  unknown          PLT     unknown  unkn

own



                                3/UL                                    

 

         All     NEUTROPHILS_  unknown  4.9 10  unknown          NE_     unknown

  unknown



                AUTO_           3/UL                                    

 

         All     T       unknown  4.9 10  unknown          NEUT_AUT  unknown  un

known



                                3/UL                    O_              

 

         All     T       unknown  138     unknown  mmol/   NA      unknown  unkn

own



                                                L                       

 

         All     T       unknown  9.3     unknown  fL      MPV     unknown  unkn

own

 

         All     MONOCYTES_AU  unknown  0.6 10  unknown          MO_     unknown

  unknown



                TO_             3/UL                                    

 

         All     T       unknown  0.6 10  unknown          MONO_AUT  unknown  un

known



                                3/UL                    O_              

 

         All     T       unknown  95.5    unknown  fL      MCV     unknown  unkn

own

 

         All     T       unknown  32.4    unknown  g/dL    MCHC    unknown  unkn

own

 

         All     T       unknown  30.9    unknown  pg      MCH     unknown  unkn

own

 

         All     LYMPHOCYTES_  unknown  1.1 10  unknown          LY_     unknown

  unknown



                AUTO_           3/UL                                    

 

         All     T       unknown  1.1 10  unknown          LYMPH_AU  unknown  un

known



                                3/UL                    TO_             

 

         All     T       unknown  4.4     unknown  meq/L   K       unknown  unkn

own

 

         All     T       unknown  1.1     unknown          INR     unknown  unkn

own

 

         All     T       unknown  11.6    unknown  g/dL    HGB     unknown  unkn

own

 

         All     T       unknown  35.8    unknown  %       HCT     unknown  unkn

own

 

         All     T       unknown  94      unknown  mg/dL   GLU     unknown  unkn

own

 

         All     T       unknown  2.7     unknown          GLOB    unknown  unkn

own

 

         All     T       unknown  81      unknown  mL/mi   GFR_-_MD  unknown  un

known



                                                n       RD              

 

         All     GFR_-_MDRD  unknown  81      unknown  mL/mi   GFR     unknown  

unknown



                                                n                       

 

         All     T       unknown  9.0     unknown          GAP     unknown  unkn

own

 

         All     EOSINOPHILS_  unknown  0.1 10  unknown          EO_     unknown

  unknown



                AUTO_           3/UL                                    

 

         All     T       unknown  0.1 10  unknown          EOS_AUTO  unknown  un

known



                                3/UL                    _               

 

         All     T       unknown  0.7     unknown  mg/dL   CREAT   unknown  unkn

own

 

         All     T       unknown  26      unknown  mmol/   CO2     unknown  unkn

own



                                                L                       

 

         All     T       unknown  103     unknown  mmol/   CL      unknown  unkn

own



                                                L                       

 

         All     T       unknown  8.7     unknown  mg/dL   CA      unknown  unkn

own

 

         All     T       unknown  15      unknown  mg/dL   BUN     unknown  unkn

own

 

         All     BILIRUBIN_TO  unknown  0.8     unknown  mg/dL   BILIT   unknown

  unknown



                CONNIE                                                     

 

         All     BASOPHILS_AU  unknown  0.1 10  unknown          BA_     unknown

  unknown



                TO_             3/UL                                    

 

         All     T       unknown  0.1 10  unknown          BASO_AUT  unknown  un

known



                                3/UL                    O_              

 

         All     T       unknown  1.4     unknown          A_G_RATI  unknown  un

known



                                                        O               

 

         All     T       unknown  14      unknown  U/L     AST     unknown  unkn

own

 

         All     T       unknown  < 10 IU/L  unknown          ALT_SGPT  unknown 

 unknown



                                                        _               

 

         All     ALT_ALANINE_  unknown  < 10 IU/L  unknown          ALT     unkn

own  unknown



                AMINOTRANSFER                                                 



                ASE                                                     

 

         All     ALKALINE_PHO  unknown  97      unknown  U/L     ALP     unknown

  unknown



                SPHATASE                                                 

 

         All     T       unknown  97      unknown  U/L     ALK_PHOS  unknown  un

known

 

         All     T       unknown  3.8     unknown  g/dL    ALB     unknown  unkn

own

 

         All     ALBUMIN_GLOB  unknown  1.4     unknown          AGRATIO  unknow

n  unknown



                ULIN_RATIO                                                 

 

         All     urea_nitroge  unknown  17      unknown  mg/dL   _9      unknown

  unknown



                n_blood                                                 

 

         All     Erythrocytes  unknown  3.33 10  unknown          _789-8  unknow

n  unknown



                _volume_in_Bl         6/UL                                    



                ood_by_Automa                                                 



                ted_count                                                 

 

         All     Erythrocyte_  unknown  15.0    unknown  %       _788-0  unknown

  unknown



                distribution_                                                 



                width_Ratio_b                                                 



                y_Automated_c                                                 



                ount                                                    

 

         All     MCV_Entitic_  unknown  96.4    unknown  fL      _787-2  unknown

  unknown



                volume_by_Aut                                                 



                omated_count                                                 

 

         All     MCH_Entitic_  unknown  30.9    unknown  pg      _785-6  unknown

  unknown



                mass_by_Autom                                                 



                ated_count                                                 

 

         All     Platelets_vo  unknown  163 10  unknown          _777-3  unknown

  unknown



                lume_in_Blood         3/UL                                    



                _by_Automated                                                 



                _count                                                  

 

         All     Platelet_mea  unknown  8.6     unknown  fL      _776-5  unknown

  unknown



                n_volume_Enti                                                 



                tic_volume_in                                                 



                _Blood_by_Ree                                                 



                s-Sharon                                                 

 

         All     neutrophil_c  unknown  3.7 10  unknown          _752-6  unknown

  unknown



                ount_blood         3/UL                                    

 

         All     monocyte_cou  unknown  0.5 10  unknown          _743-5  unknown

  unknown



                nt_blood         3/UL                                    

 

         All     lymphocyte_c  unknown  1.2 10  unknown          _732-8  unknown

  unknown



                ount_blood         3/UL                                    

 

         All     Hemoglobin_M  unknown  10.3    unknown  g/dL    _718-7  unknown

  unknown



                ass_volume_in                                                 



                _Blood                                                  

 

         All     eosinophil_c  unknown  0.1 10  unknown          _712-0  unknown

  unknown



                ount_blood         3/UL                                    

 

         All     Basophils_vo  unknown  0.0 10  unknown          _705-4  unknown

  unknown



                lume_in_Blood         3/UL                                    



                _by_Manual_co                                                 



                unt                                                     

 

         All     leukocyte_co  unknown  5.6 X10  unknown          _68     unknow

n  unknown



                unt_blood         3/UL                                    

 

         All     erythrocyte_  unknown  3.33 10  unknown          _67     unknow

n  unknown



                RBC_count         6/UL                                    

 

         All     Leukocytes_v  unknown  5.6 X10  unknown          _6690-2  unkno

wn  unknown



                olume_in_Bloo         3/UL                                    



                d_by_Automate                                                 



                d_count                                                 

 

         All     Glomerular_F  unknown  70      unknown  mL/mi   _66455  unknown

  unknown



                iltration_rat                         n                       



                e                                                       

 

         All     platelet_cou  unknown  163 10  unknown          _66     unknown

  unknown



                nt              3/UL                                    

 

         All     hemoglobin_b  unknown  10.3    unknown  g/dL    _65     unknown

  unknown



                lood                                                    

 

         All     hematocrit_b  unknown  32.1    unknown  %       _64     unknown

  unknown



                lood                                                    

 

         All     potassium_bl  unknown  4.1     unknown  meq/L   _6298-4  unknow

n  unknown



                ood                                                     

 

         All     WBC_urine_on  unknown  11-25   unknown          _5821-4  unknow

n  unknown



                _microscopy         /HPF                                    

 

         All     Urobilinogen  unknown  0.2     unknown          _5818-0  unknow

n  unknown



                _Presence_in_         (NORMAL)                                 



                Urine_by_Test                                                 



                _strip                                                  

 

         All     Specific_gra  unknown  1.020   unknown          _5811-5  unknow

n  unknown



                vity_of_Urine                                                 



                _by_Test_stri                                                 



                p                                                       

 

         All     Nitrite_Pres  unknown  NEGATIVE  unknown          _5802-4  unkn

own  unknown



                ence_in_Urine                                                 



                _by_Test_stri                                                 



                p                                                       

 

         All     Leukocyte_es  unknown  MODERATE  unknown          _5799-2  unkn

own  unknown



                terase_Presen                                                 



                ce_in_Urine_b                                                 



                y_Test_strip                                                 

 

         All     Ketones_Mass  unknown  NEGATIVE  unknown          _5797-6  unkn

own  unknown



                _volume_in_Ur                                                 



                ine_by_Test_s                                                 



                trip                                                    

 

         All     Color_of_Uri  unknown  DARK    unknown          _5778-6  unknow

n  unknown



                ne              YELLOW                                  

 

         All     Bilirubin.to  unknown  NEGATIVE  unknown          _5770-3  unkn

own  unknown



                tal_Presence_                                                 



                in_Urine_by_T                                                 



                est_strip                                                 

 

         All     clarity_urin  unknown  HAZY    unknown          _5589   unknown

  unknown



                e_point                                                 

 

         All     pH_study_of_  unknown  6.0     unknown          _51641  unknown

  unknown



                acidity                                                 

 

         All    Glomerular_fi  unknown  70      unknown  mL/mi   _48642-3  unkno

wn  unknown



                ltration_rate                         n                       



                _1.73_sq_M.pr                                                 



                edicted_among                                                 



                _non-blacks_V                                                 



                olume_Rate_Ar                                                 



                ea_in_Serum_P                                                 



                lasma_or_Bloo                                                 



                d_by_Creatini                                                 



                ne-based_form                                                 



                ula_MDRD_                                                 

 

         All     Hematocrit_V  unknown  32.1    unknown  %       _4544-3  unknow

n  unknown



                olume_Fractio                                                 



                n_of_Blood_by                                                 



                _Automated_co                                                 



                unt                                                     

 

         All     bilirubin_se  unknown  0.7     unknown  mg/dL   _43     unknown

  unknown



                rum_total                                                 

 

         All     alanine_amin  unknown  < 10 IU/L  unknown          _40     unkn

own  unknown



                otransferase_                                                 



                SGPT_serum                                                 

 

         All     carbon_dioxi  unknown  25      unknown  mmol/   _3962   unknown

  unknown



                de_serum_tota                         L                       



                l                                                       

 

         All     aspartate_am  unknown  15      unknown  U/L     _39     unknown

  unknown



                inotransferas                                                 



                e_SGOT_serum                                                 

 

         All     protein_tota  unknown  5.4     unknown  g/dL    _36     unknown

  unknown



                l_serum                                                 

 

         All     blood_glucos  unknown  113     unknown  mg/dL   _3565   unknown

  unknown



                e                                                       

 

         All     potassium_bl  unknown  4.1     unknown  meq/L   _3483   unknown

  unknown



                ood                                                     

 

         All     glucose_urin  unknown  NEGATIVE  unknown          _3369   unkno

wn  unknown



                e               mg/dL                                   

 

         All     leukocyte_es  unknown  MODERATE  unknown          _327    unkno

wn  unknown



                terase_urine_                                                 



                by_dipstick                                                 

 

         All     urobilinogen  unknown  0.2     unknown          _326    unknown

  unknown



                _urine_semiqu         (NORMAL)                                 



                antitative_di                                                 



                pstick_                                                 

 

         All     specific_gra  unknown  1.020   unknown          _325    unknown

  unknown



                vity_urine                                                 

 

         All     nitrite_urin  unknown  NEGATIVE  unknown          _323    unkno

wn  unknown



                e_semiquantit                                                 



                ative                                                   

 

         All     ketones_urin  unknown  NEGATIVE  unknown          _322    unkno

wn  unknown



                e_by_test_str                                                 



                ip                                                      

 

         All     magnesium_se  unknown  1.9     unknown  mg/dL   _32     unknown

  unknown



                rum                                                     

 

         All     bilirubin_ur  unknown  NEGATIVE  unknown          _319    unkno

wn  unknown



                ine                                                     

 

         All     mean_corpusc  unknown  96.4    unknown  fL      _315    unknown

  unknown



                ular_volume_R                                                 



                BC                                                      

 

         All     Urea_nitroge  unknown  17      unknown  mg/dL   _3094-0  unknow

n  unknown



                n_Mass_volume                                                 



                _in_Serum_or_                                                 



                Plasma                                                  

 

         All     globulin_ser  unknown  2.0     unknown          _3059   unknown

  unknown



                um                                                      

 

         All     alkaline_pho  unknown  87      unknown  U/L     _3      unknown

  unknown



                sphatase_seru                                                 



                m                                                       

 

         All     Sodium_Moles  unknown  138     unknown  mmol/   _2951-2  unknow

n  unknown



                _volume_in_Se                         L                       



                rum_or_Plasma                                                 

 

         All     Protein_Mass  unknown  5.4     unknown  g/dL    _2885-2  unknow

n  unknown



                _volume_in_Se                                                 



                rum_or_Plasma                                                 

 

         All     eosinophil_c  unknown  0.1 10  unknown          _285    unknown

  unknown



                ount_blood         3/UL                                    

 

         All     anion_gap_se  unknown  8.0     unknown          _279    unknown

  unknown



                rum                                                     

 

         All     mean_platele  unknown  8.6     unknown  fL      _2784   unknown

  unknown



                t_volume                                                 

 

         All     urine_color  unknown  DARK    unknown          _2751   unknown 

 unknown



                                YELLOW                                  

 

         All     Magnesium_Mo  unknown  1.9     unknown  mg/dL   _2601-3  unknow

n  unknown



                les_volume_in                                                 



                _Serum_or_Pla                                                 



                sma                                                     

 

         All     basophil_cou  unknown  0.0 10  unknown          _2427   unknown

  unknown



                nt_blood         3/UL                                    

 

         All     monocyte_cou  unknown  0.5 10  unknown          _2422   unknown

  unknown



                nt_blood         3/UL                                    

 

         All     lymphocyte_c  unknown  1.2 10  unknown          _2420   unknown

  unknown



                ount_blood         3/UL                                    

 

         All     neutrophil_c  unknown  3.7 10  unknown          _2418   unknown

  unknown



                ount_blood         3/UL                                    

 

         All     Glucose_Mass  unknown  NEGATIVE  unknown          _0-7  unkn

own  unknown



                _volume_in_Ur         mg/dL                                   



                ine                                                     

 

         All     Glucose_Mass  unknown  113     unknown  mg/dL   _2345-7  unknow

n  unknown



                _volume_in_Se                                                 



                rum_or_Plasma                                                 

 

         All     Globulin_Mas  unknown  2.0     unknown          _2336-6  unknow

n  unknown



                s_volume_in_S                                                 



                vanessa                                                    

 

         All     Creatinine_M  unknown  0.8     unknown  mg/dL   _2160-0  unknow

n  unknown



                ass_volume_in                                                 



                _Serum_or_Pla                                                 



                sma                                                     

 

         All     Chloride_Mol  unknown  105     unknown  mmol/   _-0  unknow

n  unknown



                es_volume_in_                         L                       



                Serum_or_Plas                                                 



                ma                                                      

 

         All     carbon_dioxi  unknown  25      unknown  mmol/   _-  unknow

n  unknown



                de_serum_tota                         L                       



                l                                                       

 

         All     Calcium_Mole  unknown  8.0     unknown  mg/dL   _-  unknow

n  unknown



                s_volume_in_S                                                 



                erum_or_Plasm                                                 



                a                                                       

 

         All     albumin_seru  unknown  3.4     unknown  g/dL    _2      unknown

  unknown



                m                                                       

 

         All     Bilirubin.to  unknown  0.7     unknown  mg/dL   _-  unknow

n  unknown



                tal_Mass_volu                                                 



                me_in_Serum_o                                                 



                r_Plasma                                                 

 

         All     Aspartate_am  unknown  15      unknown  U/L     _1920-8  unknow

n  unknown



                inotransferas                                                 



                e_Enzymatic_a                                                 



                ctivity_volum                                                 



                e_in_Serum_or                                                 



                _Plasma                                                 

 

         All     Anion_gap_4_  unknown  8.0     unknown          _1863-0  unknow

n  unknown



                in_Serum_or_P                                                 



                lasma                                                   

 

         All     creatinine_s  unknown  0.8     unknown  mg/dL   _18     unknown

  unknown



                vanessa                                                    

 

         All     Alkaline_pho  unknown  87      unknown  U/L     _1783-0  unknow

n  unknown



                sphatase_Enzy                                                 



                matic_activit                                                 



                y_volume_in_B                                                 



                lood                                                    

 

         All     Albumin_Glob  unknown  1.7     unknown          _1759-0  unknow

n  unknown



                ulin_Mass_Rat                                                 



                io_in_Serum_o                                                 



                r_Plasma                                                 

 

         All     Albumin_Mass  unknown  3.4     unknown  g/dL    _1751-7  unknow

n  unknown



                _volume_in_Se                                                 



                rum_or_Plasma                                                 

 

         All     Alanine_amin  unknown  < 10 IU/L  unknown          _1742-6  unk

nown  unknown



                otransferase_                                                 



                Enzymatic_act                                                 



                ivity_volume_                                                 



                in_Serum_or_P                                                 



                lasma                                                   

 

         All     mean_corpusc  unknown  32.1    unknown  g/dL    _17029  unknown

  unknown



                ular_hemoglob                                                 



                in_concentrat                                                 



                ion_rbc                                                 

 

         All     sodium_serum  unknown  138     unknown  mmol/   _159    unknown

  unknown



                                                L                       

 

         All     albumin_glob  unknown  1.7     unknown          _146    unknown

  unknown



                ulin_ratio_se                                                 



                rum                                                     

 

         All     chloride_ser  unknown  105     unknown  mmol/   _13     unknown

  unknown



                um                              L                       

 

         All     calcium_seru  unknown  8.0     unknown  mg/dL   _11     unknown

  unknown



                m                                                       

 

         All     mean_corpusc  unknown  30.9    unknown  pg      _1031   unknown

  unknown



                ular_hemoglob                                                 



                in_RBC                                                  

 

         All     red_blood_ce  unknown  15.0    unknown  %       _1030   unknown

  unknown



                ll_distributi                                                 



                on_width                                                 

 

         All     WBC_urine_on  unknown     unknown          _1016   unknown

  unknown



                _microscopy         /HPF                                    

 

         All     T       unknown  5.6 X10  unknown          WBC     unknown  unk

nown



                                3/UL                                    

 

         All     WBC_URINE  unknown     unknown          UWBC    unknown  u

nknown



                                /HPF                                    

 

         All     UROBILINOGEN  unknown  0.2     unknown          UUROBIL  unknow

n  unknown



                _URINE          (NORMAL)                                 

 

         All     SPECIFIC_GRA  unknown  1.020   unknown          USG     unknown

  unknown



                VITY_URINE                                                 

 

         All     T       unknown     unknown          UR_WBC  unknown  unkn

own



                                /HPF                                    

 

         All     T       unknown  0.2     unknown          UR_URO  unknown  unkn

own



                                (NORMAL)                                 

 

         All     T       unknown  1.020   unknown          UR_SG   unknown  unkn

own

 

         All     T       unknown  6.0     unknown          UR_PH   unknown  unkn

own

 

         All     T       unknown  NEGATIVE  unknown          UR_NIT  unknown  un

known

 

         All     T       unknown  MODERATE  unknown          UR_LEU_E  unknown  

unknown



                                                        STERASE         

 

         All     T       unknown  NEGATIVE  unknown          UR_KETO_  unknown  

unknown



                                                        UA_             

 

         All     T       unknown  NEGATIVE  unknown          UR_GLU  unknown  un

known



                                mg/dL                                   

 

         All     T       unknown  DARK    unknown          UR_COLOR  unknown  un

known



                                YELLOW                                  

 

         All     T       unknown  HAZY    unknown          UR_CLARI  unknown  un

known



                                                        TY              

 

         All     T       unknown  NEGATIVE  unknown          UR_BILI  unknown  u

nknown

 

         All     PH_URINE  unknown  6.0     unknown          UPH     unknown  un

known

 

         All     NITRITE_URIN  unknown  NEGATIVE  unknown          UNITRITE  unk

nown  unknown



                E                                                       

 

         All     LEUKOCYTE_ES  unknown  MODERATE  unknown          ULEUK   unkno

wn  unknown



                TERASE_URINE                                                 

 

         All     KETONES_URIN  unknown  NEGATIVE  unknown          UKET    unkno

wn  unknown



                E_UA_                                                   

 

         All     GLUCOSE_URIN  unknown  NEGATIVE  unknown          UGLUC   unkno

wn  unknown



                E_UA_           mg/dL                                   

 

         All     COLOR_URINE  unknown  DARK    unknown          UCOL    unknown 

 unknown



                                YELLOW                                  

 

         All     CLARITY_URIN  unknown  HAZY    unknown          UCLAR   unknown

  unknown



                E                                                       

 

         All     BILIRUBIN_UR  unknown  NEGATIVE  unknown          UBIL    unkno

wn  unknown



                INE                                                     

 

         All     T       unknown  0.7     unknown  mg/dL   TOTAL_BI  unknown  un

known



                                                        LI              

 

         All     T       unknown  15.0    unknown  %       RDW     unknown  unkn

own

 

         All     T       unknown  3.33 10  unknown          RBC     unknown  unk

nown



                                6/UL                                    

 

         All     T       unknown  5.4     unknown  g/dL    PRO_TOTA  unknown  un

known



                                                        L               

 

         All     T       unknown  163 10  unknown          PLT     unknown  unkn

own



                                3/UL                                    

 

         All     NEUTROPHILS_  unknown  3.7 10  unknown          NE_     unknown

  unknown



                AUTO_           3/UL                                    

 

         All     T       unknown  3.7 10  unknown          NEUT_AUT  unknown  un

known



                                3/UL                    O_              

 

         All     T       unknown  138     unknown  mmol/   NA      unknown  unkn

own



                                                L                       

 

         All     T       unknown  8.6     unknown  fL      MPV     unknown  unkn

own

 

         All     MONOCYTES_AU  unknown  0.5 10  unknown          MO_     unknown

  unknown



                TO_             3/UL                                    

 

         All     T       unknown  0.5 10  unknown          MONO_AUT  unknown  un

known



                                3/UL                    O_              

 

         All     T       unknown  1.9     unknown  mg/dL   MG      unknown  unkn

own

 

         All     T       unknown  96.4    unknown  fL      MCV     unknown  unkn

own

 

         All     T       unknown  32.1    unknown  g/dL    MCHC    unknown  unkn

own

 

         All     T       unknown  30.9    unknown  pg      MCH     unknown  unkn

own

 

         All     LYMPHOCYTES_  unknown  1.2 10  unknown          LY_     unknown

  unknown



                AUTO_           3/UL                                    

 

         All     T       unknown  1.2 10  unknown          LYMPH_AU  unknown  un

known



                                3/UL                    TO_             

 

         All     T       unknown  4.1     unknown  meq/L   K       unknown  unkn

own

 

         All     T       unknown  10.3    unknown  g/dL    HGB     unknown  unkn

own

 

         All     T       unknown  32.1    unknown  %       HCT     unknown  unkn

own

 

         All     T       unknown  113     unknown  mg/dL   GLU     unknown  unkn

own

 

         All     T       unknown  2.0     unknown          GLOB    unknown  unkn

own

 

         All     T       unknown  70      unknown  mL/mi   GFR_-_MD  unknown  un

known



                                                n       RD              

 

         All     GFR_-_MDRD  unknown  70      unknown  mL/mi   GFR     unknown  

unknown



                                                n                       

 

         All     T       unknown  8.0     unknown          GAP     unknown  unkn

own

 

         All     EOSINOPHILS_  unknown  0.1 10  unknown          EO_     unknown

  unknown



                AUTO_           3/UL                                    

 

         All     T       unknown  0.1 10  unknown          EOS_AUTO  unknown  un

known



                                3/UL                    _               

 

         All     T       unknown  0.8     unknown  mg/dL   CREAT   unknown  unkn

own

 

         All     T       unknown  25      unknown  mmol/   CO2     unknown  unkn

own



                                                L                       

 

         All     T       unknown  105     unknown  mmol/   CL      unknown  unkn

own



                                                L                       

 

         All     T       unknown  8.0     unknown  mg/dL   CA      unknown  unkn

own

 

         All     T       unknown  17      unknown  mg/dL   BUN     unknown  unkn

own

 

         All     BILIRUBIN_TO  unknown  0.7     unknown  mg/dL   BILIT   unknown

  unknown



                CONNIE                                                     

 

         All     BASOPHILS_AU  unknown  0.0 10  unknown          BA_     unknown

  unknown



                TO_             3/UL                                    

 

         All     T       unknown  0.0 10  unknown          BASO_AUT  unknown  un

known



                                3/UL                    O_              

 

         All     T       unknown  1.7     unknown          A_G_RATI  unknown  un

known



                                                        O               

 

         All     T       unknown  15      unknown  U/L     AST     unknown  unkn

own

 

         All     T       unknown  < 10 IU/L  unknown          ALT_SGPT  unknown 

 unknown



                                                        _               

 

         All     ALT_ALANINE_  unknown  < 10 IU/L  unknown          ALT     unkn

own  unknown



                AMINOTRANSFER                                                 



                ASE                                                     

 

         All     ALKALINE_PHO  unknown  87      unknown  U/L     ALP     unknown

  unknown



                SPHATASE                                                 

 

         All     T       unknown  87      unknown  U/L     ALK_PHOS  unknown  un

known

 

         All     T       unknown  3.4     unknown  g/dL    ALB     unknown  unkn

own

 

         All     ALBUMIN_GLOB  unknown  1.7     unknown          AGRATIO  unknow

n  unknown



                ULIN_RATIO                                                 







Vital Signs







                 date            measurement     value           source

 

                 2022        weight_standard  120.4           lb

 

                 2022        weight_metric   54.61           kg

 

                 2022        temperature_standard  97.6            F

 

                 2022        temperature_metric  36.44           C

 

                 2022        respiration_rate  14              /min

 

                 2022        height_standard  63.07           in

 

                 2022        height_metric   160.2           cm

 

                 2022        heart_rate      69              /min

 

                 2022        BP_systolic     112             mm[Hg]

 

                 2022        BP_diastolic    67              mm[Hg]

 

                 2022        BMI             21.36           kg/m2

 

                 2022        weight_standard  120.4           lb

 

                 2022        weight_metric   54.61           kg

 

                 2022        temperature_standard  97.4            F

 

                 2022        temperature_metric  36.33           C

 

                 2022        respiration_rate  15              /min

 

                 2022        height_standard  63.07           in

 

                 2022        height_metric   160.2           cm

 

                 2022        heart_rate      73              /min

 

                 2022        BP_systolic     151             mm[Hg]

 

                 2022        BP_diastolic    67              mm[Hg]

 

                 2022        BMI             21.36           kg/m2

## 2022-05-17 NOTE — ED PHYSICIAN DOCUMENTATION
PD HPI ALTERED MENTAL STATUS





- Stated complaint


Stated Complaint: AMS





- History obtained from


History obtained from: Patient, EMS, Caregiver (Caregivers that Zee 

noticed her to be sleepy and hard to arouse this morning after having been up to

get a light breakfast.  No noted trauma.  No illness obvious.)





- History of Present Illness


Timing - onset: How many hours ago (Staff said that he noted her up getting a 

light breakfast and then she was very sleepy and hard to arouse when they went 

to check on her an hour later.  This was about 45 minutes ago now.  She is a bit

lethargic on EMS arrival but improved on route.)


Timing - duration: Hours (1)


Timing - details: Abrupt onset, Now resolved


Quality / character: Less responsive (seemed somnolent)


Associated symptoms: No: Fever, Headache


Contributing factors: No: Recent med change, Recent illness


Basline status: Disoriented, Walker


Recently seen: Not recently seen





Review of Systems


Unable to obtain: Dementia (but she is aware of current symptoms, just poor 

memory.), Other (info from )


Constitutional: denies: Fever


Nose: denies: Congestion


Throat: denies: Sore throat


Cardiac: denies: Chest pain / pressure


Respiratory: denies: Cough


GI: denies: Abdominal Pain, Vomiting, Diarrhea


Neurologic: reports: Generalized weakness.  denies: Focal weakness, Numbness, 

Difficulty speaking, Headache, Head injury


Endocrine: denies: Weight loss





PD PAST MEDICAL HISTORY





- Past Medical History


Cardiovascular: Hypertension, High cholesterol


Respiratory: None


Neuro: Dementia, Parkinson's


Endocrine/Autoimmune: None


GI: Ulcers


GYN: None


: None


HEENT: Chronic vision loss


Psych: None


Musculoskeletal: Osteoarthritis, Chronic back pain


Derm: None





- Past Surgical History


Past Surgical History: Yes


General: Colonoscopy


Ortho: Hip replacement, Rotator cuff repair





- Present Medications


Home Medications: 


                                Ambulatory Orders











 Medication  Instructions  Recorded  Confirmed


 


Carbidopa/Levodopa 25/100 [Sinemet 2 tab PO 2300 03/18/15 05/17/22





25 mg/100 mg]   


 


Citalopram [CeleXA] 10 mg PO QPM 03/18/15 05/17/22


 


Carbidopa/Levodopa 25/100 [Sinemet 2 tab PO 0900,1300,1700,2100 04/23/19 05/17/22





25 mg/100 mg]   


 


Aspirin [[place] Aspirin] 81 mg PO DAILY 09/22/21 05/17/22


 


Atorvastatin [Lipitor] 20 mg PO DAILY PM 09/22/21 05/17/22


 


Metoprolol Succinate [Toprol Xl] 25 mg PO DAILY 09/22/21 05/17/22


 


Quetiapine Fumarate [Seroquel] 50 mg PO BID 03/31/22 05/17/22


 


Rivastigmine Tartrate 1.5 mg PO BID 03/31/22 05/17/22





[Rivastigmine]   














- Allergies


Allergies/Adverse Reactions: 


                                    Allergies











Allergy/AdvReac Type Severity Reaction Status Date / Time


 


No Known Drug Allergies Allergy   Verified 05/17/22 13:21














- Social History


Does the pt smoke?: No


Smoking Status: Never smoker


Does the pt drink ETOH?: No


Does the pt have substance abuse?: Yes





- Immunizations


Immunizations are current?: Yes





- POLST


Patient has POLST: No





PD ED PE NORMAL





- Vitals


Vital signs reviewed: Yes





- General


General: No acute distress, Well developed/nourished.  No: Alert and oriented X 

3 (person and place, but not time )





- HEENT


HEENT: Atraumatic





- Neck


Neck: Supple, no meningeal sign, No adenopathy, No bruit





- Cardiac


Cardiac: RRR, No murmur





- Respiratory


Respiratory: Clear bilaterally





- Abdomen


Abdomen: Soft, Non tender





- Derm


Derm: Normal color, Warm and dry





- Extremities


Extremities: No tenderness to palpate, No edema





- Neuro


Neuro: CNs 2-12 intact, No motor deficit, No sensory deficit, Normal speech


Eye Opening: Spontaneous


Motor: Obeys Commands


Verbal: Oriented


GCS Score: 15





Results





- Vitals


Vitals: 


                               Vital Signs - 24 hr











  05/17/22 05/17/22 05/17/22





  09:44 10:11 12:11


 


Temperature 36.9 C  


 


Heart Rate 70 81 80


 


Respiratory 16 17 14





Rate   


 


Blood Pressure 155/70 H 113/69 160/68 H


 


O2 Saturation 100 94 100








                                     Oxygen











O2 Source [With Activity]      Room air


 


O2 Source [Without Activity]   Room air


 


O2 Source                      Room air

















- EKG (time done)


  ** 10:32


Rate: Rate (enter#) (65)


Rhythm: NSR


Axis: Normal


Intervals: Normal MO


QRS: Normal


Ischemia: Normal ST segments.  No: ST elevation c/w ischemia, ST depression





- Tele (time rhythm occurred)


  ** nurse


Telemetry / rhythm strip: Wide complex tachycardia (While in the ER the patient 

did have a approximately 5 to 10-second episode of rapid heart rate that self 

resolved.  He had had a different axis with wide complexes but there was still a

baseline atrial wavering consistent with A. fib.  It self corrected and she did 

not have any symptoms.)





- Labs


Labs: 


                                Laboratory Tests











  05/17/22 05/17/22 05/17/22





  09:56 09:56 09:56


 


WBC  6.7  


 


RBC  3.70 L  


 


Hgb  11.5 L  


 


Hct  35.8 L  


 


MCV  96.8  


 


MCH  31.1 H  


 


MCHC  32.1  


 


RDW  14.8  


 


Plt Count  203  


 


MPV  8.9  


 


Neut # (Auto)  4.5  


 


Lymph # (Auto)  1.4 L  


 


Mono # (Auto)  0.6  


 


Eos # (Auto)  0.1  


 


Baso # (Auto)  0.1  


 


Absolute Nucleated RBC  0.00  


 


Nucleated RBC %  0.0  


 


PT   12.4 


 


INR   1.1 


 


Sodium    140


 


Potassium    4.4


 


Chloride    105


 


Carbon Dioxide    26


 


Anion Gap    9.0


 


BUN    19


 


Creatinine    0.8


 


Estimated GFR (MDRD)    70 L


 


Glucose    92


 


Calcium    8.7


 


Magnesium    2.0


 


Total Bilirubin    0.7


 


AST    17


 


ALT    < 10 L


 


Alkaline Phosphatase    89


 


Troponin I High Sens   


 


Total Protein    6.7


 


Albumin    3.8


 


Globulin    2.9


 


Albumin/Globulin Ratio    1.3


 


Lipase    35


 


Urine Color   


 


Urine Clarity   


 


Urine pH   


 


Ur Specific Gravity   


 


Urine Protein   


 


Urine Glucose (UA)   


 


Urine Ketones   


 


Urine Occult Blood   


 


Urine Nitrite   


 


Urine Bilirubin   


 


Urine Urobilinogen   


 


Ur Leukocyte Esterase   


 


Urine RBC   


 


Urine WBC   


 


Ur Squamous Epith Cells   


 


Urine Bacteria   


 


Ur Microscopic Review   


 


Urine Culture Comments   














  05/17/22 05/17/22





  09:56 11:39


 


WBC  


 


RBC  


 


Hgb  


 


Hct  


 


MCV  


 


MCH  


 


MCHC  


 


RDW  


 


Plt Count  


 


MPV  


 


Neut # (Auto)  


 


Lymph # (Auto)  


 


Mono # (Auto)  


 


Eos # (Auto)  


 


Baso # (Auto)  


 


Absolute Nucleated RBC  


 


Nucleated RBC %  


 


PT  


 


INR  


 


Sodium  


 


Potassium  


 


Chloride  


 


Carbon Dioxide  


 


Anion Gap  


 


BUN  


 


Creatinine  


 


Estimated GFR (MDRD)  


 


Glucose  


 


Calcium  


 


Magnesium  


 


Total Bilirubin  


 


AST  


 


ALT  


 


Alkaline Phosphatase  


 


Troponin I High Sens  3.4 


 


Total Protein  


 


Albumin  


 


Globulin  


 


Albumin/Globulin Ratio  


 


Lipase  


 


Urine Color   YELLOW


 


Urine Clarity   SL. CLOUDY


 


Urine pH   6.0


 


Ur Specific Gravity   1.010


 


Urine Protein   NEGATIVE


 


Urine Glucose (UA)   NEGATIVE


 


Urine Ketones   NEGATIVE


 


Urine Occult Blood   SMALL H


 


Urine Nitrite   NEGATIVE


 


Urine Bilirubin   NEGATIVE


 


Urine Urobilinogen   0.2 (NORMAL)


 


Ur Leukocyte Esterase   LARGE H


 


Urine RBC   0-5


 


Urine WBC   11-25 H


 


Ur Squamous Epith Cells   MOD Squamous H


 


Urine Bacteria   Moderate H


 


Ur Microscopic Review   INDICATED


 


Urine Culture Comments   NOT INDICATED














- Rads (name of study)


  ** head CT


Radiology: Prelim report reviewed (no ICH nor acute process), See rad report





  ** chest xay


Radiology: Prelim report reviewed (no acute process), See rad report





PD MEDICAL DECISION MAKING





- ED course


Complexity details: reviewed old records, reviewed results, re-evaluated patient

(still awake and conversant without symptoms here.  says she is at 

baseline. ), considered differential, d/w patient, d/w family ()





Departure





- Departure


Disposition: 01 Home, Self Care


Clinical Impression: 


 Pyuria





Altered mental status


Qualifiers:


 Altered mental status type: somnolence Qualified Code(s): R40.0 - Somnolence





Atrial fibrillation


Qualifiers:


 Atrial fibrillation type: paroxysmal Qualified Code(s): I48.0 - Paroxysmal 

atrial fibrillation





Condition: Stable


Record reviewed to determine appropriate education?: Yes


Comments: 


Its unclear the cause of your drowsiness and difficulty being aroused this 

morning.  He seem at your baseline now without any obvious acute problem.  Your 

head CT did not show any signs of bleeding swelling or acute changes.





Your basic blood tests and chest x-ray are good without signs of infection or 

electrolyte problems.  Your blood sugar was good.





Your urine sample does show some white cells in the urine which may be just 

normal often enough.  We will do a urine culture and determine if there is an 

actual infection there and call if that results in a couple of days.





While you are here in the ER, you did have approximately 10 seconds of a regular

heartbeat that looked likely to be atrial fibrillation.  There were no symptoms 

at the time so I do not think this is related to your symptoms earlier.





Recheck if recurring episodes or symptoms otherwise follow-up with your primary 

care.


Discharge Date/Time: 05/17/22 12:55

## 2022-06-01 ENCOUNTER — HOSPITAL ENCOUNTER (EMERGENCY)
Dept: HOSPITAL 76 - ED | Age: 77
Discharge: HOME | End: 2022-06-01
Payer: MEDICARE

## 2022-06-01 VITALS — DIASTOLIC BLOOD PRESSURE: 81 MMHG | SYSTOLIC BLOOD PRESSURE: 164 MMHG
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
## 2022-06-01 DIAGNOSIS — S02.2XXA: Primary | ICD-10-CM

## 2022-06-01 PROCEDURE — 99282 EMERGENCY DEPT VISIT SF MDM: CPT

## 2022-06-01 PROCEDURE — 70486 CT MAXILLOFACIAL W/O DYE: CPT

## 2022-06-01 PROCEDURE — 99284 EMERGENCY DEPT VISIT MOD MDM: CPT

## 2022-06-01 PROCEDURE — 72125 CT NECK SPINE W/O DYE: CPT

## 2022-06-01 PROCEDURE — 70450 CT HEAD/BRAIN W/O DYE: CPT

## 2022-06-01 RX ADMIN — HYDROCODONE BITARTRATE AND ACETAMINOPHEN STA TAB: 5; 325 TABLET ORAL at 20:06

## 2022-06-01 NOTE — ED PHYSICIAN DOCUMENTATION
History of Present Illness





- Stated complaint


Stated Complaint: GLF/FACIAL INJURY





- Chief complaint


Chief Complaint: Trauma Hd/Nk





- History obtained from


History obtained from: Patient, EMS





- History of Present Illness


Timing: Today


Pain level max: 0


Pain level now: 0





- Additonal information


Additional information: 





Patient is a 76-year-old female who presents to the emergency department after a

fall.  She was brought in by EMS.  She lives at the nursing home and was found 

on a routine check to be on the floor.  Patient does not complain of any pain.  

She states she is unsure what happened.  She does have blood on her nose and an 

abrasion.  Unknown loss of consciousness.





Review of Systems


Unable to obtain: Confused, Dementia


Constitutional: denies: Fever


GI: denies: Vomiting





PD PAST MEDICAL HISTORY





- Past Medical History


Cardiovascular: Hypertension, High cholesterol


Respiratory: None


Neuro: Dementia, Parkinson's


Endocrine/Autoimmune: None


GI: Ulcers


GYN: None


: None


HEENT: Chronic vision loss


Psych: None


Musculoskeletal: Osteoarthritis, Chronic back pain


Derm: None





- Past Surgical History


Past Surgical History: Yes


General: Colonoscopy


Ortho: Hip replacement, Rotator cuff repair





- Present Medications


Home Medications: 


                                Ambulatory Orders











 Medication  Instructions  Recorded  Confirmed


 


Carbidopa/Levodopa 25/100 [Sinemet 2 tab PO 2300 03/18/15 05/17/22





25 mg/100 mg]   


 


Citalopram [CeleXA] 10 mg PO QPM 03/18/15 05/17/22


 


Carbidopa/Levodopa 25/100 [Sinemet 2 tab PO 0900,1300,1700,2100 04/23/19 05/17/22





25 mg/100 mg]   


 


Aspirin [[place] Aspirin] 81 mg PO DAILY 09/22/21 05/17/22


 


Atorvastatin [Lipitor] 20 mg PO DAILY PM 09/22/21 05/17/22


 


Metoprolol Succinate [Toprol Xl] 25 mg PO DAILY 09/22/21 05/17/22


 


Quetiapine Fumarate [Seroquel] 50 mg PO BID 03/31/22 05/17/22


 


Rivastigmine Tartrate 1.5 mg PO BID 03/31/22 05/17/22





[Rivastigmine]   














- Allergies


Allergies/Adverse Reactions: 


                                    Allergies











Allergy/AdvReac Type Severity Reaction Status Date / Time


 


No Known Drug Allergies Allergy   Verified 06/01/22 17:29














- Social History


Does the pt smoke?: No


Smoking Status: Never smoker


Does the pt drink ETOH?: No


Does the pt have substance abuse?: Yes





- Immunizations


Immunizations are current?: Yes





- POLST


Patient has POLST: No





PD ED PE NORMAL





- Vitals


Vital signs reviewed: Yes





- General


General: No acute distress, Well developed/nourished, Other (Alert, oriented to 

person only)





- HEENT


HEENT: PERRL, EOMI, Other (Dried blood on the nose.  Otherwise atraumatic exam.)





- Neck


Neck: Supple, no meningeal sign, No bony TTP





- Cardiac


Cardiac: RRR





- Respiratory


Respiratory: No respiratory distress, Clear bilaterally





- Abdomen


Abdomen: Soft, Non tender, Non distended





- Back


Back: No spinal TTP





- Derm


Derm: Warm and dry





- Extremities


Extremities: No deformity, No tenderness to palpate, Normal ROM s pain





- Neuro


Neuro: CNs 2-12 intact, No motor deficit, No sensory deficit, Normal speech


Eye Opening: Spontaneous


Motor: Obeys Commands


Verbal: Confused


GCS Score: 14





Results





- Vitals


Vitals: 


                               Vital Signs - 24 hr











  06/01/22 06/01/22





  17:26 20:06


 


Temperature 35.5 C L 


 


Heart Rate 76 79


 


Respiratory 16 19





Rate  


 


Blood Pressure 168/76 H 164/81 H


 


O2 Saturation 95 98








                                     Oxygen











O2 Source [With Activity]      Room air


 


O2 Source [Without Activity]   Room air


 


O2 Source                      Room air

















- Rads (name of study)


  ** Head CT


Radiology: Final report received, EMP read contemporaneously, See rad report





  ** Maxillofacial CT


Radiology: Final report received, EMP read contemporaneously, See rad report





  ** Cervical spine CT


Radiology: Final report received, EMP read contemporaneously, See rad report





PD MEDICAL DECISION MAKING





- ED course


Complexity details: reviewed results, re-evaluated patient, considered 

differential, d/w patient, d/w family


ED course: 





No acute findings on head CT, cervical spine CT or maxillofacial CT other than 

bilateral nasal bone fractures.  She does appear to have some sinusitis as well.

 Pain well controlled.  Wounds were cleansed and bandaged.  No other acute 

traumatic injuries.  Family counseled regarding signs and symptoms for which I 

believe and urgent re-evaluation would be necessary. Family with good 

understanding of and agreement to plan and is comfortable going home at this 

time





This document was made in part using voice recognition software. While efforts 

are made to proofread this document, sound alike and grammatical errors may 

occur.





Departure





- Departure


Disposition: 01 Home, Self Care


Clinical Impression: 


Nasal bone fracture


Qualifiers:


 Encounter type: initial encounter Fracture type: closed Qualified Code(s): 

S02.2XXA - Fracture of nasal bones, initial encounter for closed fracture





Fall from slip, trip, or stumble


Qualifiers:


 Encounter type: initial encounter Qualified Code(s): W01.0XXA - Fall on same 

level from slipping, tripping and stumbling without subsequent striking against 

object, initial encounter





Condition: Good


Instructions:  ED Fx Nasal Conf W X Ray, ED Head Injury Closed


Follow-Up: 


Your,doctor in 1 week [Other]


Comments: 


Please follow-up with her doctor for further care.  Her head CT, cervical spine 

CT and maxillofacial CT do not show any acute findings other than nasal bone 

fractures.  Return if she worsens.


Discharge Date/Time: 06/01/22 20:19

## 2022-06-01 NOTE — EXTERNAL MEDICAL SUMMARY RPT
Continuity of Care Document

                             Created on:2022



Patient:FERNANDO THOMPSON

Sex:Female

:1945

External Reference #:[id number]





Demographics







                          Address                   [address] [place] [address] [place] [address]

 

                          Phone                     Unavailable

 

                          Preferred Language        Unknown

 

                          Marital Status            Unknown

 

                          Quaker Affiliation     Unknown

 

                          Race                      Unknown

 

                          Ethnic Group              Unknown









Author







                          Organization              [facility]

 

                          [place] [address]

 

                          Phone                     [phone]









Care Team Providers







                    Name                Role                Phone

 

                    M.D.                Unavailable         Unavailable









Allergies

No information.



Encounters

No information.



Medications

No information.



Problems







                     date                description         facility

 

                     2022            Total score?        All

 

                     2022            Tobacco use and exposure  All

 

                     2022            Tobacco smoking status NHIS  All

 

                     2022            Former smoker       All

 

                     2022            Details of drug misuse behavior  All

 

                     2022            Confusional arousals  All

 

                     2022            Confusional arousal disorder  All

 

                     2022            Alcohol use         All

 

                     2022            XR SHOULDER 2-3 VIEW  All

 

                     2022            XR ELBOW 2 VIEWS    All

 

                     2022            Traumatic hematoma  All

 

                     2022            Total score?        All

 

                     2022            Tobacco use and exposure  All

 

                     2022            Tobacco smoking status NHIS  All

 

                     2022            Pain of right shoulder joint  All

 

                     2022            Pain in right shoulder  All

 

                     2022            Pain in right elbow  All

 

                     2022            Pain in joint involving upper arm  All

 

                     2022            Pain in joint involving shoulder region

  All

 

                     2022            Other injury of unspecified body region

, initial encounter  All

 

                     2022            Health-related behavior  All

 

                     2022            Former smoker       All

 

                     2022            Exercise            All

 

                     2022            Elbow joint pain    All

 

                     2022            Details of drug misuse behavior  All

 

                     2022            Contusion of unspecified site  All

 

                     2022            Alcohol use         All







Procedures







                     date                description         facility

 

                     2022            XR SHOULDER 2-3 VIEW  All







Results







            test       status     date       ordered by  attending  specimen anthony

e

 

            urea_nitrogen_blood  unknown    2022   unknown    unknown    unk

nown

 

            Erythrocytes_volume_in  unknown    2022   unknown    unknown    

unknown



           _Blood_by_Automated_cou                                             



           nt                                                     

 

            Erythrocyte_distributi  unknown    2022   unknown    unknown    

unknown



           on_width_Ratio_by_Autom                                             



           ated_count                                             

 

            [facility]_Entitic_volume_by_  unknown    2022   unknown    unknown    

unknown



           Automated_count                                             

 

            MCH_Entitic_mass_by_Au  unknown    2022   unknown    unknown    

unknown



           tomated_count                                             

 

            Platelets_volume_in_Bl  unknown    2022   unknown    unknown    

unknown



           ood_by_Automated_count                                             

 

            Platelet_mean_volume_E  unknown    2022   unknown    unknown    

unknown



           ntitic_volume_in_Blood_                                             



           by_Rees-Sharon                                             

 

            neutrophil_count_blood  unknown    2022   unknown    unknown    

unknown

 

            monocyte_count_blood  unknown    2022   unknown    unknown    un

known

 

            lymphocyte_count_blood  unknown    2022   unknown    unknown    

unknown

 

            Hemoglobin_Mass_volume  unknown    2022   unknown    unknown    

unknown



           _in_Blood                                              

 

            eosinophil_count_blood  unknown    2022   unknown    unknown    

unknown

 

            Basophils_volume_in_Bl  unknown    2022   unknown    unknown    

unknown



           ood_by_Manual_count                                             

 

            leukocyte_count_blood  unknown    2022   unknown    unknown    u

nknown

 

            erythrocyte_RBC_count  unknown    2022   unknown    unknown    u

nknown

 

            Leukocytes_volume_in_B  unknown    2022   unknown    unknown    

unknown



           lood_by_Automated_count                                             

 

            Glomerular_Filtration_  unknown    2022   unknown    unknown    

unknown



           rate                                                   

 

            platelet_count  unknown    2022   unknown    unknown    unknown

 

            hemoglobin_blood  unknown    2022   unknown    unknown    unknow

n

 

            hematocrit_blood  unknown    2022   unknown    unknown    unknow

n

 

            INR_in_Platelet_poor_p  unknown    2022   unknown    unknown    

unknown



           lasma_by_Coagulation_as                                             



           say                                                    

 

            potassium_blood  unknown    2022   unknown    unknown    unknown

 

            Prothrombin_time_PT_  unknown    2022   unknown    unknown    un

known

 

            prothrombin_time_patie  unknown    2022   unknown    unknown    

unknown



           nt_                                                    

 

           Glomerular_filtration_r  unknown    2022   unknown    unknown    

unknown



           ate_1.73_sq_M.predicted                                             



           _among_non-blacks_Volum                                             



           e_Rate_Area_in_Serum_Pl                                             



           asma_or_Blood_by_Creati                                             



           nine-based_formula_MDRD                                             



           _                                                      

 

            Hematocrit_Volume_Frac  unknown    2022   unknown    unknown    

unknown



           tion_of_Blood_by_Automa                                             



           ted_count                                              

 

            bilirubin_serum_total  unknown    2022   unknown    unknown    u

nknown

 

            alanine_aminotransfera  unknown    2022   unknown    unknown    

unknown



           se_SGPT_serum                                             

 

            carbon_dioxide_serum_t  unknown    2022   unknown    unknown    

unknown



           otal                                                   

 

            aspartate_aminotransfe  unknown    2022   unknown    unknown    

unknown



           rase_SGOT_serum                                             

 

            protein_total_serum  unknown    2022   unknown    unknown    unk

nown

 

            blood_glucose  unknown    2022   unknown    unknown    unknown

 

            potassium_blood  unknown    2022   unknown    unknown    unknown

 

            magnesium_serum  unknown    2022   unknown    unknown    unknown

 

            mean_corpuscular_volum  unknown    2022   unknown    unknown    

unknown



           e_RBC                                                  

 

            Urea_nitrogen_Mass_vol  unknown    2022   unknown    unknown    

unknown



           ume_in_Serum_or_Plasma                                             

 

            international_normaliz  unknown    2022   unknown    unknown    

unknown



           ed_ratio_INR_                                             

 

            globulin_serum  unknown    2022   unknown    unknown    unknown

 

            alkaline_phosphatase_s  unknown    2022   unknown    unknown    

unknown



           vanessa                                                   

 

            Sodium_Moles_volume_in  unknown    2022   unknown    unknown    

unknown



           _Serum_or_Plasma                                             

 

            Protein_Mass_volume_in  unknown    2022   unknown    unknown    

unknown



           _Serum_or_Plasma                                             

 

            eosinophil_count_blood  unknown    2022   unknown    unknown    

unknown

 

            anion_gap_serum  unknown    2022   unknown    unknown    unknown

 

            mean_platelet_volume  unknown    2022   unknown    unknown    un

known

 

            Magnesium_Moles_volume  unknown    2022   unknown    unknown    

unknown



           _in_Serum_or_Plasma                                             

 

            basophil_count_blood  unknown    2022   unknown    unknown    un

known

 

            monocyte_count_blood  unknown    2022   unknown    unknown    un

known

 

            lymphocyte_count_blood  unknown    2022   unknown    unknown    

unknown

 

            neutrophil_count_blood  unknown    2022   unknown    unknown    

unknown

 

            Glucose_Mass_volume_in  unknown    2022   unknown    unknown    

unknown



           _Serum_or_Plasma                                             

 

            Globulin_Mass_volume_i  unknown    2022   unknown    unknown    

unknown



           n_Serum                                                

 

            Creatinine_Mass_volume  unknown    2022   unknown    unknown    

unknown



           _in_Serum_or_Plasma                                             

 

            Chloride_Moles_volume_  unknown    2022   unknown    unknown    

unknown



           in_Serum_or_Plasma                                             

 

            carbon_dioxide_serum_t  unknown    2022   unknown    unknown    

unknown



           otal                                                   

 

            Calcium_Moles_volume_i  unknown    2022   unknown    unknown    

unknown



           n_Serum_or_Plasma                                             

 

            albumin_serum  unknown    2022   unknown    unknown    unknown

 

            Bilirubin.total_Mass_v  unknown    2022   unknown    unknown    

unknown



           olume_in_Serum_or_Plasm                                             



           a                                                      

 

            Aspartate_aminotransfe  unknown    2022   unknown    unknown    

unknown



           rase_Enzymatic_activity                                             



           _volume_in_Serum_or_Pla                                             



           sma                                                    

 

            Anion_gap_4_in_Serum_o  unknown    2022   unknown    unknown    

unknown



           r_Plasma                                               

 

            creatinine_serum  unknown    2022   unknown    unknown    unknow

n

 

            Alkaline_phosphatase_E  unknown    2022   unknown    unknown    

unknown



           nzymatic_activity_volum                                             



           e_in_Blood                                             

 

            Albumin_Globulin_Mass_  unknown    2022   unknown    unknown    

unknown



           Ratio_in_Serum_or_Plasm                                             



           a                                                      

 

            Albumin_Mass_volume_in  unknown    2022   unknown    unknown    

unknown



           _Serum_or_Plasma                                             

 

            Alanine_aminotransfera  unknown    2022   unknown    unknown    

unknown



           se_Enzymatic_activity_v                                             



           olume_in_Serum_or_Plasm                                             



           a                                                      

 

            mean_corpuscular_hemog  unknown    [id number]   unknown    unknown    

unknown



           lobin_concentration_rbc                                             

 

            sodium_serum  unknown    [id number]   unknown    unknown    unknown

 

            albumin_globulin_ratio  unknown    00471854   unknown    unknown    

unknown



           _serum                                                 

 

            chloride_serum  unknown    [id number]   unknown    unknown    unknown

 

            calcium_serum  unknown    [id number]   unknown    unknown    unknown

 

            mean_corpuscular_hemog  unknown    80796839   unknown    unknown    

unknown



           lobin_RBC                                              

 

            red_blood_cell_distrib  unknown    61021314   unknown    unknown    

unknown



           ution_width                                             

 

            T          unknown    62200522   unknown    unknown    unknown

 

            T          unknown    11230483   unknown    unknown    unknown

 

            T          unknown    68486628   unknown    unknown    unknown

 

            T          unknown    34772943   unknown    unknown    unknown

 

            T          unknown    93315161   unknown    unknown    unknown

 

            T          unknown    48438141   unknown    unknown    unknown

 

            T          unknown    69467240   unknown    unknown    unknown

 

            NEUTROPHILS_AUTO_  unknown    11428892   unknown    unknown    unkno

wn

 

            T          unknown    83217667   unknown    unknown    unknown

 

            T          unknown    65891165   unknown    unknown    unknown

 

            T          unknown    92836967   unknown    unknown    unknown

 

            MONOCYTES_AUTO_  unknown    96844417   unknown    unknown    unknown

 

            T          unknown    82704454   unknown    unknown    unknown

 

            T          unknown    23819536   unknown    unknown    unknown

 

            T          unknown    72422899   unknown    unknown    unknown

 

            T          unknown    12597554   unknown    unknown    unknown

 

            T          unknown    83731805   unknown    unknown    unknown

 

            LYMPHOCYTES_AUTO_  unknown    00146102   unknown    unknown    unkno

wn

 

            T          unknown    57156893   unknown    unknown    unknown

 

            T          unknown    60386842   unknown    unknown    unknown

 

            T          unknown    46102417   unknown    unknown    unknown

 

            T          unknown    47195453   unknown    unknown    unknown

 

            T          unknown    88524572   unknown    unknown    unknown

 

            T          unknown    39283323   unknown    unknown    unknown

 

            T          unknown    01627783   unknown    unknown    unknown

 

            T          unknown    31987140   unknown    unknown    unknown

 

            GFR_-_MDRD  unknown    72806345   unknown    unknown    unknown

 

            T          unknown    78220989   unknown    unknown    unknown

 

            EOSINOPHILS_AUTO_  unknown    43530744   unknown    unknown    unkno

wn

 

            T          unknown    55387104   unknown    unknown    unknown

 

            T          unknown    70840310   unknown    unknown    unknown

 

            T          unknown    32863967   unknown    unknown    unknown

 

            T          unknown    44612021   unknown    unknown    unknown

 

            T          unknown    87218190   unknown    unknown    unknown

 

            T          unknown    71765574   unknown    unknown    unknown

 

            BILIRUBIN_TOTAL  unknown    54314440   unknown    unknown    unknown

 

            BASOPHILS_AUTO_  unknown    09966725   unknown    unknown    unknown

 

            T          unknown    76509280   unknown    unknown    unknown

 

            T          unknown    39515532   unknown    unknown    unknown

 

            T          unknown    80694310   unknown    unknown    unknown

 

            T          unknown    14832978   unknown    unknown    unknown

 

            ALT_ALANINE_AMINOTRANS  unknown    42318374   unknown    unknown    

unknown



           FERASE                                                 

 

            ALKALINE_PHOSPHATASE  unknown    19498414   unknown    unknown    un

known

 

            T          unknown    99154489   unknown    unknown    unknown

 

            T          unknown    2022   unknown    unknown    unknown

 

            ALBUMIN_GLOBULIN_RATIO  unknown    2022   unknown    unknown    

unknown

 

            urea_nitrogen_blood  unknown    2022   unknown    unknown    unk

nown

 

            Erythrocytes_volume_in  unknown    2022   unknown    unknown    

unknown



           _Blood_by_Automated_cou                                             



           nt                                                     

 

            Erythrocyte_distributi  unknown    2022   unknown    unknown    

unknown



           on_width_Ratio_by_Autom                                             



           ated_count                                             

 

            [facility]V_Entitic_volume_by_  unknown    2022   unknown    unknown    

unknown



           Automated_count                                             

 

            MCH_Entitic_mass_by_Au  unknown    2022   unknown    unknown    

unknown



           tomated_count                                             

 

            Platelets_volume_in_Bl  unknown    2022   unknown    unknown    

unknown



           ood_by_Automated_count                                             

 

            Platelet_mean_volume_E  unknown    2022   unknown    unknown    

unknown



           ntitic_volume_in_Blood_                                             



           by_Rees-Sharon                                             

 

            neutrophil_count_blood  unknown    2022   unknown    unknown    

unknown

 

            Hemoglobin_Mass_volume  unknown    2022   unknown    unknown    

unknown



           _in_Blood                                              

 

            leukocyte_count_blood  unknown    2022   unknown    unknown    u

nknown

 

            erythrocyte_RBC_count  unknown    2022   unknown    unknown    u

nknown

 

            Leukocytes_volume_in_B  unknown    2022   unknown    unknown    

unknown



           lood_by_Automated_count                                             

 

            Glomerular_Filtration_  unknown    2022   unknown    unknown    

unknown



           rate                                                   

 

            platelet_count  unknown    2022   unknown    unknown    unknown

 

            hemoglobin_blood  unknown    2022   unknown    unknown    unknow

n

 

            hematocrit_blood  unknown    2022   unknown    unknown    unknow

n

 

            potassium_blood  unknown    2022   unknown    unknown    unknown

 

           Glomerular_filtration_r  unknown    [id number]   unknown    unknown    

unknown



           ate_1.73_sq_M.predicted                                             



           _among_non-blacks_Volum                                             



           e_Rate_Area_in_Serum_Pl                                             



           asma_or_Blood_by_Creati                                             



           nine-based_formula_MDRD                                             



           _                                                      

 

            Hematocrit_Volume_Frac  unknown    2022   unknown    unknown    

unknown



           tion_of_Blood_by_Automa                                             



           ted_count                                              

 

            bilirubin_serum_total  unknown    2022   unknown    unknown    u

nknown

 

            alanine_aminotransfera  unknown    2022   unknown    unknown    

unknown



           se_SGPT_serum                                             

 

            carbon_dioxide_serum_t  unknown    2022   unknown    unknown    

unknown



           otal                                                   

 

            aspartate_aminotransfe  unknown    [id number]   unknown    unknown    

unknown



           rase_SGOT_serum                                             

 

            protein_total_serum  unknown    94490754   unknown    unknown    unk

nown

 

            blood_glucose  unknown    2022   unknown    unknown    unknown

 

            potassium_blood  unknown    2022   unknown    unknown    unknown

 

            mean_corpuscular_volum  unknown    2022   unknown    unknown    

unknown



           e_RBC                                                  

 

            Urea_nitrogen_Mass_vol  unknown    2022   unknown    unknown    

unknown



           ume_in_Serum_or_Plasma                                             

 

            globulin_serum  unknown    2022   unknown    unknown    unknown

 

            alkaline_phosphatase_s  unknown    2022   unknown    unknown    

unknown



           vanessa                                                   

 

            Sodium_Moles_volume_in  unknown    2022   unknown    unknown    

unknown



           _Serum_or_Plasma                                             

 

            Protein_Mass_volume_in  unknown    2022   unknown    unknown    

unknown



           _Serum_or_Plasma                                             

 

            anion_gap_serum  unknown    2022   unknown    unknown    unknown

 

            mean_platelet_volume  unknown    2022   unknown    unknown    un

known

 

            neutrophil_count_blood  unknown    2022   unknown    unknown    

unknown

 

            Glucose_Mass_volume_in  unknown    2022   unknown    unknown    

unknown



           _Serum_or_Plasma                                             

 

            Globulin_Mass_volume_i  unknown    2022   unknown    unknown    

unknown



           n_Serum                                                

 

            Creatinine_Mass_volume  unknown    2022   unknown    unknown    

unknown



           _in_Serum_or_Plasma                                             

 

            Chloride_Moles_volume_  unknown    2022   unknown    unknown    

unknown



           in_Serum_or_Plasma                                             

 

            carbon_dioxide_serum_t  unknown    2022   unknown    unknown    

unknown



           otal                                                   

 

            Calcium_Moles_volume_i  unknown    2022   unknown    unknown    

unknown



           n_Serum_or_Plasma                                             

 

            albumin_serum  unknown    2022   unknown    unknown    unknown

 

            Bilirubin.total_Mass_v  unknown    2022   unknown    unknown    

unknown



           olume_in_Serum_or_Plasm                                             



           a                                                      

 

            Aspartate_aminotransfe  unknown    2022   unknown    unknown    

unknown



           rase_Enzymatic_activity                                             



           _volume_in_Serum_or_Pla                                             



           sma                                                    

 

            Anion_gap_4_in_Serum_o  unknown    2022   unknown    unknown    

unknown



           r_Plasma                                               

 

            creatinine_serum  unknown    [id number]   unknown    unknown    unknow

n

 

            Alkaline_phosphatase_E  unknown    2022   unknown    unknown    

unknown



           nzymatic_activity_volum                                             



           e_in_Blood                                             

 

            Albumin_Globulin_Mass_  unknown    2022   unknown    unknown    

unknown



           Ratio_in_Serum_or_Plasm                                             



           a                                                      

 

            Albumin_Mass_volume_in  unknown    2022   unknown    unknown    

unknown



           _Serum_or_Plasma                                             

 

            Alanine_aminotransfera  unknown    2022   unknown    unknown    

unknown



           se_Enzymatic_activity_v                                             



           olume_in_Serum_or_Plasm                                             



           a                                                      

 

            mean_corpuscular_hemog  unknown    2022   unknown    unknown    

unknown



           lobin_concentration_rbc                                             

 

            sodium_serum  unknown    2022   unknown    unknown    unknown

 

            albumin_globulin_ratio  unknown    35353233   unknown    unknown    

unknown



           _serum                                                 

 

            chloride_serum  unknown    25804551   unknown    unknown    unknown

 

            calcium_serum  unknown    36646303   unknown    unknown    unknown

 

            mean_corpuscular_hemog  unknown    34432929   unknown    unknown    

unknown



           lobin_RBC                                              

 

            red_blood_cell_distrib  unknown    41633230   unknown    unknown    

unknown



           ution_width                                             

 

            T          unknown    93392130   unknown    unknown    unknown

 

            T          unknown    58040240   unknown    unknown    unknown

 

            T          unknown    28878143   unknown    unknown    unknown

 

            T          unknown    09526144   unknown    unknown    unknown

 

            T          unknown    04868932   unknown    unknown    unknown

 

            T          unknown    13735792   unknown    unknown    unknown

 

            NEUTROPHILS_AUTO_  unknown    79626201   unknown    unknown    unkno

wn

 

            T          unknown    00883079   unknown    unknown    unknown

 

            T          unknown    34222340   unknown    unknown    unknown

 

            T          unknown    87665970   unknown    unknown    unknown

 

            T          unknown    42390449   unknown    unknown    unknown

 

            T          unknown    08918877   unknown    unknown    unknown

 

            T          unknown    02609343   unknown    unknown    unknown

 

            T          unknown    73887421   unknown    unknown    unknown

 

            T          unknown    50764049   unknown    unknown    unknown

 

            T          unknown    58425648   unknown    unknown    unknown

 

            T          unknown    07757654   unknown    unknown    unknown

 

            T          unknown    31489971   unknown    unknown    unknown

 

            T          unknown    64380088   unknown    unknown    unknown

 

            GFR_-_MDRD  unknown    08799594   unknown    unknown    unknown

 

            T          unknown    42066632   unknown    unknown    unknown

 

            T          unknown    25302427   unknown    unknown    unknown

 

            T          unknown    29996461   unknown    unknown    unknown

 

            T          unknown    44922977   unknown    unknown    unknown

 

            T          unknown    64458936   unknown    unknown    unknown

 

            T          unknown    14581690   unknown    unknown    unknown

 

            BILIRUBIN_TOTAL  unknown    52155575   unknown    unknown    unknown

 

            T          unknown    04073212   unknown    unknown    unknown

 

            T          unknown    40055739   unknown    unknown    unknown

 

            T          unknown    35958763   unknown    unknown    unknown

 

            ALT_ALANINE_AMINOTRANS  unknown    88560215   unknown    unknown    

unknown



           FERASE                                                 

 

            ALKALINE_PHOSPHATASE  unknown    70694295   unknown    unknown    un

known

 

            T          unknown    57898275   unknown    unknown    unknown

 

            T          unknown    41279736   unknown    unknown    unknown

 

            ALBUMIN_GLOBULIN_RATIO  unknown    11466484   unknown    unknown    

unknown

 

            urea_nitrogen_blood  unknown    74447374   unknown    unknown    unk

nown

 

            Erythrocytes_volume_in  unknown    66753487   unknown    unknown    

unknown



           _Blood_by_Automated_cou                                             



           nt                                                     

 

            Erythrocyte_distributi  unknown    55065024   unknown    unknown    

unknown



           on_width_Ratio_by_Autom                                             



           ated_count                                             

 

            [facility]_Entitic_volume_by_  unknown    67319135   unknown    unknown    

unknown



           Automated_count                                             

 

            [facility]_Entitic_mass_by_Au  unknown    17907132   unknown    unknown    

unknown



           tomated_count                                             

 

            Platelets_volume_in_Bl  unknown    18333853   unknown    unknown    

unknown



           ood_by_Automated_count                                             

 

            Platelet_mean_volume_E  unknown    11012193   unknown    unknown    

unknown



           ntitic_volume_in_Blood_                                             



           by_Rees-Sharon                                             

 

            neutrophil_count_blood  unknown    24024369   unknown    unknown    

unknown

 

            monocyte_count_blood  unknown    43539318   unknown    unknown    un

known

 

            lymphocyte_count_blood  unknown    97748209   unknown    unknown    

unknown

 

            Hemoglobin_Mass_volume  unknown    01241172   unknown    unknown    

unknown



           _in_Blood                                              

 

            eosinophil_count_blood  unknown    23265187   unknown    unknown    

unknown

 

            Basophils_volume_in_Bl  unknown    99031557   unknown    unknown    

unknown



           ood_by_Manual_count                                             

 

            leukocyte_count_blood  unknown    65207004   unknown    unknown    u

nknown

 

            erythrocyte_RBC_count  unknown    45267336   unknown    unknown    u

nknown

 

            Leukocytes_volume_in_B  unknown    17053734   unknown    unknown    

unknown



           lood_by_Automated_count                                             

 

            Glomerular_Filtration_  unknown    46767900   unknown    unknown    

unknown



           rate                                                   

 

            platelet_count  unknown    92501297   unknown    unknown    unknown

 

            hemoglobin_blood  unknown    96362069   unknown    unknown    unknow

n

 

            hematocrit_blood  unknown    34079162   unknown    unknown    unknow

n

 

            INR_in_Platelet_poor_p  unknown    29678904   unknown    unknown    

unknown



           lasma_by_Coagulation_as                                             



           say                                                    

 

            potassium_blood  unknown    73074837   unknown    unknown    unknown

 

            Prothrombin_time_PT_  unknown    15498686   unknown    unknown    un

known

 

            WBC_urine_on_microscop  unknown    47662515   unknown    unknown    

unknown



           y                                                      

 

            Urobilinogen_Presence_  unknown    99113341   unknown    unknown    

unknown



           in_Urine_by_Test_strip                                             

 

            Specific_gravity_of_Ur  unknown    45112573   unknown    unknown    

unknown



           ine_by_Test_strip                                             

 

            Nitrite_Presence_in_Ur  unknown    62842873   unknown    unknown    

unknown



           ine_by_Test_strip                                             

 

            Leukocyte_esterase_Pre  unknown    05465053   unknown    unknown    

unknown



           sence_in_Urine_by_Test_                                             



           strip                                                  

 

            Ketones_Mass_volume_in  unknown    27678020   unknown    unknown    

unknown



           _Urine_by_Test_strip                                             

 

            Color_of_Urine  unknown    10736976   unknown    unknown    unknown

 

            Bilirubin.total_Presen  unknown    72808877   unknown    unknown    

unknown



           ce_in_Urine_by_Test_str                                             



           ip                                                     

 

            clarity_urine_point  unknown    81152880   unknown    unknown    unk

nown

 

            pH_study_of_acidity  unknown    42072595   unknown    unknown    unk

nown

 

            prothrombin_time_patie  unknown    49656623   unknown    unknown    

unknown



           nt_                                                    

 

           Glomerular_filtration_r  unknown    18723584   unknown    unknown    

unknown



           ate_1.73_sq_M.predicted                                             



           _among_non-blacks_Volum                                             



           e_Rate_Area_in_Serum_Pl                                             



           asma_or_Blood_by_Creati                                             



           nine-based_formula_MDRD                                             



           _                                                      

 

            Hematocrit_Volume_Frac  unknown    88413244   unknown    unknown    

unknown



           tion_of_Blood_by_Automa                                             



           ted_count                                              

 

            bilirubin_serum_total  unknown    12488956   unknown    unknown    u

nknown

 

            alanine_aminotransfera  unknown    84812677   unknown    unknown    

unknown



           se_SGPT_serum                                             

 

            carbon_dioxide_serum_t  unknown    45354967   unknown    unknown    

unknown



           otal                                                   

 

            aspartate_aminotransfe  unknown    16374719   unknown    unknown    

unknown



           rase_SGOT_serum                                             

 

            protein_total_serum  unknown    01994174   unknown    unknown    unk

nown

 

            blood_glucose  unknown    32405334   unknown    unknown    unknown

 

            potassium_blood  unknown    38642697   unknown    unknown    unknown

 

            glucose_urine  unknown    13785766   unknown    unknown    unknown

 

            leukocyte_esterase_uri  unknown    33414043   unknown    unknown    

unknown



           ne_by_dipstick                                             

 

            urobilinogen_urine_sem  unknown    37680230   unknown    unknown    

unknown



           iquantitative_dipstick_                                             

 

            specific_gravity_urine  unknown    75139161   unknown    unknown    

unknown

 

            nitrite_urine_semiquan  unknown    88129162   unknown    unknown    

unknown



           titative                                               

 

            ketones_urine_by_test_  unknown    78741636   unknown    unknown    

unknown



           strip                                                  

 

            bilirubin_urine  unknown    20858698   unknown    unknown    unknown

 

            mean_corpuscular_volum  unknown    37387781   unknown    unknown    

unknown



           e_RBC                                                  

 

            Urea_nitrogen_Mass_vol  unknown    21157307   unknown    unknown    

unknown



           ume_in_Serum_or_Plasma                                             

 

            international_normaliz  unknown    29920424   unknown    unknown    

unknown



           ed_ratio_INR_                                             

 

            globulin_serum  unknown    20672969   unknown    unknown    unknown

 

            alkaline_phosphatase_s  unknown    67707805   unknown    unknown    

unknown



           vanessa                                                   

 

            Sodium_Moles_volume_in  unknown    21238501   unknown    unknown    

unknown



           _Serum_or_Plasma                                             

 

            Protein_Mass_volume_in  unknown    49830575   unknown    unknown    

unknown



           _Serum_or_Plasma                                             

 

            eosinophil_count_blood  unknown    45011940   unknown    unknown    

unknown

 

            anion_gap_serum  unknown    47641829   unknown    unknown    unknown

 

            mean_platelet_volume  unknown    98450977   unknown    unknown    un

known

 

            urine_color  unknown    01927925   unknown    unknown    unknown

 

            basophil_count_blood  unknown    05159093   unknown    unknown    un

known

 

            monocyte_count_blood  unknown    91375839   unknown    unknown    un

known

 

            lymphocyte_count_blood  unknown    23097836   unknown    unknown    

unknown

 

            neutrophil_count_blood  unknown    77665597   unknown    unknown    

unknown

 

            Glucose_Mass_volume_in  unknown    47321497   unknown    unknown    

unknown



           _Urine                                                 

 

            Glucose_Mass_volume_in  unknown    56616801   unknown    unknown    

unknown



           _Serum_or_Plasma                                             

 

            Globulin_Mass_volume_i  unknown    30218767   unknown    unknown    

unknown



           n_Serum                                                

 

            Creatinine_Mass_volume  unknown    81157029   unknown    unknown    

unknown



           _in_Serum_or_Plasma                                             

 

            Chloride_Moles_volume_  unknown    62436958   unknown    unknown    

unknown



           in_Serum_or_Plasma                                             

 

            carbon_dioxide_serum_t  unknown    68259152   unknown    unknown    

unknown



           otal                                                   

 

            Calcium_Moles_volume_i  unknown    15759599   unknown    unknown    

unknown



           n_Serum_or_Plasma                                             

 

            albumin_serum  unknown    80215714   unknown    unknown    unknown

 

            Bilirubin.total_Mass_v  unknown    39904495   unknown    unknown    

unknown



           olume_in_Serum_or_Plasm                                             



           a                                                      

 

            Aspartate_aminotransfe  unknown    84133886   unknown    unknown    

unknown



           rase_Enzymatic_activity                                             



           _volume_in_Serum_or_Pla                                             



           sma                                                    

 

            Anion_gap_4_in_Serum_o  unknown    13246451   unknown    unknown    

unknown



           r_Plasma                                               

 

            creatinine_serum  unknown    13945847   unknown    unknown    unknow

n

 

            Alkaline_phosphatase_E  unknown    12255740   unknown    unknown    

unknown



           nzymatic_activity_volum                                             



           e_in_Blood                                             

 

            Albumin_Globulin_Mass_  unknown    69528223   unknown    unknown    

unknown



           Ratio_in_Serum_or_Plasm                                             



           a                                                      

 

            Albumin_Mass_volume_in  unknown    06528642   unknown    unknown    

unknown



           _Serum_or_Plasma                                             

 

            Alanine_aminotransfera  unknown    48707887   unknown    unknown    

unknown



           se_Enzymatic_activity_v                                             



           olume_in_Serum_or_Plasm                                             



           a                                                      

 

            mean_corpuscular_hemog  unknown    74779874   unknown    unknown    

unknown



           lobin_concentration_rbc                                             

 

            sodium_serum  unknown    11281986   unknown    unknown    unknown

 

            albumin_globulin_ratio  unknown    26470027   unknown    unknown    

unknown



           _serum                                                 

 

            chloride_serum  unknown    75303307   unknown    unknown    unknown

 

            calcium_serum  unknown    03494462   unknown    unknown    unknown

 

            mean_corpuscular_hemog  unknown    78784164   unknown    unknown    

unknown



           lobin_RBC                                              

 

            red_blood_cell_distrib  unknown    05356265   unknown    unknown    

unknown



           ution_width                                             

 

            WBC_urine_on_microscop  unknown    68213757   unknown    unknown    

unknown



           y                                                      

 

            T          unknown    43016375   unknown    unknown    unknown

 

            WBC_URINE  unknown    11276729   unknown    unknown    unknown

 

            UROBILINOGEN_URINE  unknown    92733550   unknown    unknown    unkn

own

 

            SPECIFIC_GRAVITY_URINE  unknown    68360996   unknown    unknown    

unknown

 

            T          unknown    23354275   unknown    unknown    unknown

 

            T          unknown    90314303   unknown    unknown    unknown

 

            T          unknown    54608610   unknown    unknown    unknown

 

            T          unknown    21949301   unknown    unknown    unknown

 

            T          unknown    21465671   unknown    unknown    unknown

 

            T          unknown    34874090   unknown    unknown    unknown

 

            T          unknown    24499686   unknown    unknown    unknown

 

            T          unknown    99128999   unknown    unknown    unknown

 

            T          unknown    18255894   unknown    unknown    unknown

 

            T          unknown    07183016   unknown    unknown    unknown

 

            T          unknown    53026265   unknown    unknown    unknown

 

            PH_URINE   unknown    45433700   unknown    unknown    unknown

 

            NITRITE_URINE  unknown    89443976   unknown    unknown    unknown

 

            LEUKOCYTE_ESTERASE_URI  unknown    40651044   unknown    unknown    

unknown



           NE                                                     

 

            KETONES_URINE_UA_  unknown    80060080   unknown    unknown    unkno

wn

 

            GLUCOSE_URINE_UA_  unknown    16255213   unknown    unknown    unkno

wn

 

            COLOR_URINE  unknown    14618774   unknown    unknown    unknown

 

            CLARITY_URINE  unknown    61113664   unknown    unknown    unknown

 

            BILIRUBIN_URINE  unknown    12746229   unknown    unknown    unknown

 

            T          unknown    96870974   unknown    unknown    unknown

 

            T          unknown    82704827   unknown    unknown    unknown

 

            T          unknown    84412189   unknown    unknown    unknown

 

            T          unknown    37280247   unknown    unknown    unknown

 

            T          unknown    64206366   unknown    unknown    unknown

 

            T          unknown    88099515   unknown    unknown    unknown

 

            NEUTROPHILS_AUTO_  unknown    39963591   unknown    unknown    unkno

wn

 

            T          unknown    47935933   unknown    unknown    unknown

 

            T          unknown    60037959   unknown    unknown    unknown

 

            T          unknown    55647144   unknown    unknown    unknown

 

            MONOCYTES_AUTO_  unknown    89528454   unknown    unknown    unknown

 

            T          unknown    44302411   unknown    unknown    unknown

 

            T          unknown    27172472   unknown    unknown    unknown

 

            T          unknown    97558521   unknown    unknown    unknown

 

            T          unknown    99123242   unknown    unknown    unknown

 

            LYMPHOCYTES_AUTO_  unknown    13770411   unknown    unknown    unkno

wn

 

            T          unknown    45848128   unknown    unknown    unknown

 

            T          unknown    93329872   unknown    unknown    unknown

 

            T          unknown    21013785   unknown    unknown    unknown

 

            T          unknown    16912623   unknown    unknown    unknown

 

            T          unknown    57701457   unknown    unknown    unknown

 

            T          unknown    06435945   unknown    unknown    unknown

 

            T          unknown    73750978   unknown    unknown    unknown

 

            T          unknown    55889114   unknown    unknown    unknown

 

            GFR_-_MDRD  unknown    46633966   unknown    unknown    unknown

 

            T          unknown    98044190   unknown    unknown    unknown

 

            EOSINOPHILS_AUTO_  unknown    72954232   unknown    unknown    unkno

wn

 

            T          unknown    70520327   unknown    unknown    unknown

 

            T          unknown    19641970   unknown    unknown    unknown

 

            T          unknown    39381613   unknown    unknown    unknown

 

            T          unknown    37353837   unknown    unknown    unknown

 

            T          unknown    66745721   unknown    unknown    unknown

 

            T          unknown    56805847   unknown    unknown    unknown

 

            BILIRUBIN_TOTAL  unknown    89141277   unknown    unknown    unknown

 

            BASOPHILS_AUTO_  unknown    80690335   unknown    unknown    unknown

 

            T          unknown    16878044   unknown    unknown    unknown

 

            T          unknown    [id number]   unknown    unknown    unknown

 

            T          unknown    [id number]   unknown    unknown    unknown

 

            T          unknown    [id number]   unknown    unknown    unknown

 

            ALT_ALANINE_AMINOTRANS  unknown    2022   unknown    unknown    

unknown



           FERASE                                                 

 

            ALKALINE_PHOSPHATASE  unknown    2022   unknown    unknown    un

known

 

            T          unknown    2022   unknown    unknown    unknown

 

            T          unknown    2022   unknown    unknown    unknown

 

            ALBUMIN_GLOBULIN_RATIO  unknown    2022   unknown    unknown    

unknown

 

            urea_nitrogen_blood  unknown    2022   unknown    unknown    unk

nown

 

            Erythrocytes_volume_in  unknown    2022   unknown    unknown    

unknown



           _Blood_by_Automated_cou                                             



           nt                                                     

 

            Erythrocyte_distributi  unknown    2022   unknown    unknown    

unknown



           on_width_Ratio_by_Autom                                             



           ated_count                                             

 

            [facility]_Entitic_volume_by_  unknown    2022   unknown    unknown    

unknown



           Automated_count                                             

 

            [facility]_Entitic_mass_by_Au  unknown    2022   unknown    unknown    

unknown



           tomated_count                                             

 

            Platelets_volume_in_Bl  unknown    2022   unknown    unknown    

unknown



           ood_by_Automated_count                                             

 

            Platelet_mean_volume_E  unknown    2022   unknown    unknown    

unknown



           ntitic_volume_in_Blood_                                             



           by_Rees-Sharon                                             

 

            neutrophil_count_blood  unknown    2022   unknown    unknown    

unknown

 

            monocyte_count_blood  unknown    2022   unknown    unknown    un

known

 

            lymphocyte_count_blood  unknown    2022   unknown    unknown    

unknown

 

            Hemoglobin_Mass_volume  unknown    2022   unknown    unknown    

unknown



           _in_Blood                                              

 

            eosinophil_count_blood  unknown    2022   unknown    unknown    

unknown

 

            Basophils_volume_in_Bl  unknown    2022   unknown    unknown    

unknown



           ood_by_Manual_count                                             

 

            leukocyte_count_blood  unknown    2022   unknown    unknown    u

nknown

 

            erythrocyte_RBC_count  unknown    2022   unknown    unknown    u

nknown

 

            Leukocytes_volume_in_B  unknown    2022   unknown    unknown    

unknown



           lood_by_Automated_count                                             

 

            Glomerular_Filtration_  unknown    2022   unknown    unknown    

unknown



           rate                                                   

 

            platelet_count  unknown    [id number]   unknown    unknown    unknown

 

            hemoglobin_blood  unknown    2022   unknown    unknown    unknow

n

 

            hematocrit_blood  unknown    [id number]   unknown    unknown    unknow

n

 

            potassium_blood  unknown    22279842   unknown    unknown    unknown

 

            WBC_urine_on_microscop  unknown    31105775   unknown    unknown    

unknown



           y                                                      

 

            Urobilinogen_Presence_  unknown    [id number]   unknown    unknown    

unknown



           in_Urine_by_Test_strip                                             

 

            Specific_gravity_of_Ur  unknown    51702810   unknown    unknown    

unknown



           ine_by_Test_strip                                             

 

            Nitrite_Presence_in_Ur  unknown    63126874   unknown    unknown    

unknown



           ine_by_Test_strip                                             

 

            Leukocyte_esterase_Pre  unknown    81999084   unknown    unknown    

unknown



           sence_in_Urine_by_Test_                                             



           strip                                                  

 

            Ketones_Mass_volume_in  unknown    2022   unknown    unknown    

unknown



           _Urine_by_Test_strip                                             

 

            Color_of_Urine  unknown    2022   unknown    unknown    unknown

 

            Bilirubin.total_Presen  unknown    2022   unknown    unknown    

unknown



           ce_in_Urine_by_Test_str                                             



           ip                                                     

 

            clarity_urine_point  unknown    2022   unknown    unknown    unk

nown

 

            pH_study_of_acidity  unknown    2022   unknown    unknown    unk

nown

 

           Glomerular_filtration_r  unknown    2022   unknown    unknown    

unknown



           ate_1.73_sq_M.predicted                                             



           _among_non-blacks_Volum                                             



           e_Rate_Area_in_Serum_Pl                                             



           asma_or_Blood_by_Creati                                             



           nine-based_formula_MDRD                                             



           _                                                      

 

            Hematocrit_Volume_Frac  unknown    2022   unknown    unknown    

unknown



           tion_of_Blood_by_Automa                                             



           ted_count                                              

 

            bilirubin_serum_total  unknown    2022   unknown    unknown    u

nknown

 

            alanine_aminotransfera  unknown    2022   unknown    unknown    

unknown



           se_SGPT_serum                                             

 

            carbon_dioxide_serum_t  unknown    2022   unknown    unknown    

unknown



           otal                                                   

 

            aspartate_aminotransfe  unknown    2022   unknown    unknown    

unknown



           rase_SGOT_serum                                             

 

            protein_total_serum  unknown    2022   unknown    unknown    unk

nown

 

            blood_glucose  unknown    2022   unknown    unknown    unknown

 

            potassium_blood  unknown    [id number]   unknown    unknown    unknown

 

            glucose_urine  unknown    24106756   unknown    unknown    unknown

 

            leukocyte_esterase_uri  unknown    12109802   unknown    unknown    

unknown



           ne_by_dipstick                                             

 

            urobilinogen_urine_sem  unknown    2022   unknown    unknown    

unknown



           iquantitative_dipstick_                                             

 

            specific_gravity_urine  unknown    [id number]   unknown    unknown    

unknown

 

            nitrite_urine_semiquan  unknown    2022   unknown    unknown    

unknown



           titative                                               

 

            ketones_urine_by_test_  unknown    [id number]   unknown    unknown    

unknown



           strip                                                  

 

            magnesium_serum  unknown    94020315   unknown    unknown    unknown

 

            bilirubin_urine  unknown    70476198   unknown    unknown    unknown

 

            mean_corpuscular_volum  unknown    12801157   unknown    unknown    

unknown



           e_RBC                                                  

 

            Urea_nitrogen_Mass_vol  unknown    32758725   unknown    unknown    

unknown



           ume_in_Serum_or_Plasma                                             

 

            globulin_serum  unknown    65649087   unknown    unknown    unknown

 

            alkaline_phosphatase_s  unknown    2022   unknown    unknown    

unknown



           vanessa                                                   

 

            Sodium_Moles_volume_in  unknown    95950980   unknown    unknown    

unknown



           _Serum_or_Plasma                                             

 

            Protein_Mass_volume_in  unknown    2022   unknown    unknown    

unknown



           _Serum_or_Plasma                                             

 

            eosinophil_count_blood  unknown    2022   unknown    unknown    

unknown

 

            anion_gap_serum  unknown    2022   unknown    unknown    unknown

 

            mean_platelet_volume  unknown    2022   unknown    unknown    un

known

 

            urine_color  unknown    2022   unknown    unknown    unknown

 

            Magnesium_Moles_volume  unknown    2022   unknown    unknown    

unknown



           _in_Serum_or_Plasma                                             

 

            basophil_count_blood  unknown    2022   unknown    unknown    un

known

 

            monocyte_count_blood  unknown    2022   unknown    unknown    un

known

 

            lymphocyte_count_blood  unknown    2022   unknown    unknown    

unknown

 

            neutrophil_count_blood  unknown    2022   unknown    unknown    

unknown

 

            Glucose_Mass_volume_in  unknown    2022   unknown    unknown    

unknown



           _Urine                                                 

 

            Glucose_Mass_volume_in  unknown    2022   unknown    unknown    

unknown



           _Serum_or_Plasma                                             

 

            Globulin_Mass_volume_i  unknown    2022   unknown    unknown    

unknown



           n_Serum                                                

 

            Creatinine_Mass_volume  unknown    2022   unknown    unknown    

unknown



           _in_Serum_or_Plasma                                             

 

            Chloride_Moles_volume_  unknown    2022   unknown    unknown    

unknown



           in_Serum_or_Plasma                                             

 

            carbon_dioxide_serum_t  unknown    2022   unknown    unknown    

unknown



           otal                                                   

 

            Calcium_Moles_volume_i  unknown    2022   unknown    unknown    

unknown



           n_Serum_or_Plasma                                             

 

            albumin_serum  unknown    2022   unknown    unknown    unknown

 

            Bilirubin.total_Mass_v  unknown    2022   unknown    unknown    

unknown



           olume_in_Serum_or_Plasm                                             



           a                                                      

 

            Aspartate_aminotransfe  unknown    2022   unknown    unknown    

unknown



           rase_Enzymatic_activity                                             



           _volume_in_Serum_or_Pla                                             



           sma                                                    

 

            Anion_gap_4_in_Serum_o  unknown    2022   unknown    unknown    

unknown



           r_Plasma                                               

 

            creatinine_serum  unknown    2022   unknown    unknown    unknow

n

 

            Alkaline_phosphatase_E  unknown    2022   unknown    unknown    

unknown



           nzymatic_activity_volum                                             



           e_in_Blood                                             

 

            Albumin_Globulin_Mass_  unknown    2022   unknown    unknown    

unknown



           Ratio_in_Serum_or_Plasm                                             



           a                                                      

 

            Albumin_Mass_volume_in  unknown    2022   unknown    unknown    

unknown



           _Serum_or_Plasma                                             

 

            Alanine_aminotransfera  unknown    2022   unknown    unknown    

unknown



           se_Enzymatic_activity_v                                             



           olume_in_Serum_or_Plasm                                             



           a                                                      

 

            mean_corpuscular_hemog  unknown    2022   unknown    unknown    

unknown



           lobin_concentration_rbc                                             

 

            sodium_serum  unknown    75750177   unknown    unknown    unknown

 

            albumin_globulin_ratio  unknown    00020297   unknown    unknown    

unknown



           _serum                                                 

 

            chloride_serum  unknown    83322059   unknown    unknown    unknown

 

            calcium_serum  unknown    70902496   unknown    unknown    unknown

 

            mean_corpuscular_hemog  unknown    25570367   unknown    unknown    

unknown



           lobin_RBC                                              

 

            red_blood_cell_distrib  unknown    67342616   unknown    unknown    

unknown



           ution_width                                             

 

            WBC_urine_on_microscop  unknown    74336765   unknown    unknown    

unknown



           y                                                      

 

            T          unknown    64486784   unknown    unknown    unknown

 

            WBC_URINE  unknown    95917286   unknown    unknown    unknown

 

            UROBILINOGEN_URINE  unknown    05781094   unknown    unknown    unkn

own

 

            SPECIFIC_GRAVITY_URINE  unknown    24223798   unknown    unknown    

unknown

 

            T          unknown    73979931   unknown    unknown    unknown

 

            T          unknown    28709991   unknown    unknown    unknown

 

            T          unknown    40113696   unknown    unknown    unknown

 

            T          unknown    52313333   unknown    unknown    unknown

 

            T          unknown    11477641   unknown    unknown    unknown

 

            T          unknown    67930747   unknown    unknown    unknown

 

            T          unknown    21255690   unknown    unknown    unknown

 

            T          unknown    52515172   unknown    unknown    unknown

 

            T          unknown    83489940   unknown    unknown    unknown

 

            T          unknown    61296639   unknown    unknown    unknown

 

            T          unknown    54350967   unknown    unknown    unknown

 

            PH_URINE   unknown    71482643   unknown    unknown    unknown

 

            NITRITE_URINE  unknown    78206454   unknown    unknown    unknown

 

            LEUKOCYTE_ESTERASE_URI  unknown    78238041   unknown    unknown    

unknown



           NE                                                     

 

            KETONES_URINE_UA_  unknown    71142555   unknown    unknown    unkno

wn

 

            GLUCOSE_URINE_UA_  unknown    45658099   unknown    unknown    unkno

wn

 

            COLOR_URINE  unknown    01613185   unknown    unknown    unknown

 

            CLARITY_URINE  unknown    42420031   unknown    unknown    unknown

 

            BILIRUBIN_URINE  unknown    17053339   unknown    unknown    unknown

 

            T          unknown    67434026   unknown    unknown    unknown

 

            T          unknown    96414705   unknown    unknown    unknown

 

            T          unknown    91414329   unknown    unknown    unknown

 

            T          unknown    94619882   unknown    unknown    unknown

 

            T          unknown    73923278   unknown    unknown    unknown

 

            NEUTROPHILS_AUTO_  unknown    44676167   unknown    unknown    unkno

wn

 

            T          unknown    00899715   unknown    unknown    unknown

 

            T          unknown    00845632   unknown    unknown    unknown

 

            T          unknown    20698933   unknown    unknown    unknown

 

            MONOCYTES_AUTO_  unknown    25936425   unknown    unknown    unknown

 

            T          unknown    45146212   unknown    unknown    unknown

 

            T          unknown    45854203   unknown    unknown    unknown

 

            T          unknown    95432000   unknown    unknown    unknown

 

            T          unknown    44728640   unknown    unknown    unknown

 

            T          unknown    36438482   unknown    unknown    unknown

 

            LYMPHOCYTES_AUTO_  unknown    29338820   unknown    unknown    unkno

wn

 

            T          unknown    11841551   unknown    unknown    unknown

 

            T          unknown    63126241   unknown    unknown    unknown

 

            T          unknown    24785644   unknown    unknown    unknown

 

            T          unknown    94243159   unknown    unknown    unknown

 

            T          unknown    59191280   unknown    unknown    unknown

 

            T          unknown    85155868   unknown    unknown    unknown

 

            T          unknown    2022   unknown    unknown    unknown

 

            GFR_-_MDRD  unknown    2022   unknown    unknown    unknown

 

            T          unknown    2022   unknown    unknown    unknown

 

            EOSINOPHILS_AUTO_  unknown    2022   unknown    unknown    unkno

wn

 

            T          unknown    2022   unknown    unknown    unknown

 

            T          unknown    2022   unknown    unknown    unknown

 

            T          unknown    2022   unknown    unknown    unknown

 

            T          unknown    2022   unknown    unknown    unknown

 

            T          unknown    2022   unknown    unknown    unknown

 

            T          unknown    2022   unknown    unknown    unknown

 

            BILIRUBIN_TOTAL  unknown    2022   unknown    unknown    unknown

 

            BASOPHILS_AUTO_  unknown    2022   unknown    unknown    unknown

 

            T          unknown    2022   unknown    unknown    unknown

 

            T          unknown    2022   unknown    unknown    unknown

 

            T          unknown    2022   unknown    unknown    unknown

 

            T          unknown    2022   unknown    unknown    unknown

 

            ALT_ALANINE_AMINOTRANS  unknown    2022   unknown    unknown    

unknown



           FERASE                                                 

 

            ALKALINE_PHOSPHATASE  unknown    2022   unknown    unknown    un

known

 

            T          unknown    2022   unknown    unknown    unknown

 

            T          unknown    2022   unknown    unknown    unknown

 

            ALBUMIN_GLOBULIN_RATIO  unknown    2022   unknown    unknown    

unknown









         facility  observation  status  value   reference  units   lab code  abn

ormal  

line



                                        range                           notes

 

         All     urea_nitroge  unknown  19      unknown  mg/dL   _9      unknown

  unknown



                n_blood                                                 

 

         All     Erythrocytes  unknown  3.70 10  unknown          _789-8  unknow

n  unknown



                _volume_in_Bl         6/UL                                    



                ood_by_Automa                                                 



                ted_count                                                 

 

         All     Erythrocyte_  unknown  14.8    unknown  %       _788-0  unknown

  unknown



                distribution_                                                 



                width_Ratio_b                                                 



                y_Automated_c                                                 



                ount                                                    

 

         All     [facility]_Entitic_  unknown  96.8    unknown  fL      _787-2  unknown

  unknown



                volume_by_Aut                                                 



                omated_count                                                 

 

         All     [facility]_Entitic_  unknown  31.1    unknown  pg      _785-6  unknown

  unknown



                mass_by_Autom                                                 



                ated_count                                                 

 

         All     Platelets_vo  unknown  203 10  unknown          _777-3  unknown

  unknown



                lume_in_Blood         3/UL                                    



                _by_Automated                                                 



                _count                                                  

 

         All     Platelet_mea  unknown  8.9     unknown  fL      _776-5  unknown

  unknown



                n_volume_Enti                                                 



                tic_volume_in                                                 



                _Blood_by_Ree                                                 



                s-Sharon                                                 

 

         All     neutrophil_c  unknown  4.5 10  unknown          _752-6  unknown

  unknown



                ount_blood         3/UL                                    

 

         All     monocyte_cou  unknown  0.6 10  unknown          _743-5  unknown

  unknown



                nt_blood         3/UL                                    

 

         All     lymphocyte_c  unknown  1.4 10  unknown          _732-8  unknown

  unknown



                ount_blood         3/UL                                    

 

         All     Hemoglobin_M  unknown  11.5    unknown  g/dL    _718-7  unknown

  unknown



                ass_volume_in                                                 



                _Blood                                                  

 

         All     eosinophil_c  unknown  0.1 10  unknown          _712-0  unknown

  unknown



                ount_blood         3/UL                                    

 

         All     Basophils_vo  unknown  0.1 10  unknown          _705-4  unknown

  unknown



                lume_in_Blood         3/UL                                    



                _by_Manual_co                                                 



                unt                                                     

 

         All     leukocyte_co  unknown  6.7 X10  unknown          _68     unknow

n  unknown



                unt_blood         3/UL                                    

 

         All     erythrocyte_  unknown  3.70 10  unknown          _67     unknow

n  unknown



                RBC_count         6/UL                                    

 

         All     Leukocytes_v  unknown  6.7 X10  unknown          _6690-2  unkno

wn  unknown



                olume_in_Bloo         3/UL                                    



                d_by_Automate                                                 



                d_count                                                 

 

         All     Glomerular_F  unknown  70      unknown  mL/mi   _66455  unknown

  unknown



                iltration_rat                         n                       



                e                                                       

 

         All     platelet_cou  unknown  203 10  unknown          _66     unknown

  unknown



                nt              3/UL                                    

 

         All     hemoglobin_b  unknown  11.5    unknown  g/dL    _65     unknown

  unknown



                lood                                                    

 

         All     hematocrit_b  unknown  35.8    unknown  %       _64     unknown

  unknown



                lood                                                    

 

         All     INR_in_Plate  unknown  1.1     unknown          _6301-6  unknow

n  unknown



                let_poor_plas                                                 



                ma_by_Coagula                                                 



                tion_assay                                                 

 

         All     potassium_bl  unknown  4.4     unknown  meq/L   _6298-4  unknow

n  unknown



                ood                                                     

 

         All     Prothrombin_  unknown  12.4 SECS  unknown          _5902-2  unk

nown  unknown



                time_PT_                                                 

 

         All     prothrombin_  unknown  12.4 SECS  unknown          _50     unkn

own  unknown



                time_patient_                                                 

 

         All    Glomerular_fi  unknown  70      unknown  mL/mi   _48642-3  unkno

wn  unknown



                ltration_rate                         n                       



                _1.73_sq_M.pr                                                 



                edicted_among                                                 



                _non-blacks_V                                                 



                olume_Rate_Ar                                                 



                ea_in_Serum_P                                                 



                lasma_or_Bloo                                                 



                d_by_Creatini                                                 



                ne-based_form                                                 



                ula_MDRD_                                                 

 

         All     Hematocrit_V  unknown  35.8    unknown  %       _4544-3  unknow

n  unknown



                olume_Fractio                                                 



                n_of_Blood_by                                                 



                _Automated_co                                                 



                unt                                                     

 

         All     bilirubin_se  unknown  0.7     unknown  mg/dL   _43     unknown

  unknown



                rum_total                                                 

 

         All     alanine_amin  unknown  < 10 IU/L  unknown          _40     unkn

own  unknown



                otransferase_                                                 



                SGPT_serum                                                 

 

         All     carbon_dioxi  unknown  26      unknown  mmol/   _3962   unknown

  unknown



                de_serum_tota                         L                       



                l                                                       

 

         All     aspartate_am  unknown  17      unknown  U/L     _39     unknown

  unknown



                inotransferas                                                 



                e_SGOT_serum                                                 

 

         All     protein_tota  unknown  6.7     unknown  g/dL    _36     unknown

  unknown



                l_serum                                                 

 

         All     blood_glucos  unknown  92      unknown  mg/dL   _3565   unknown

  unknown



                e                                                       

 

         All     potassium_bl  unknown  4.4     unknown  meq/L   _3483   unknown

  unknown



                ood                                                     

 

         All     magnesium_se  unknown  2.0     unknown  mg/dL   _32     unknown

  unknown



                rum                                                     

 

         All     mean_corpusc  unknown  96.8    unknown  fL      _315    unknown

  unknown



                ular_volume_R                                                 



                BC                                                      

 

         All     Urea_nitroge  unknown  19      unknown  mg/dL   _3094-0  unknow

n  unknown



                n_Mass_volume                                                 



                _in_Serum_or_                                                 



                Plasma                                                  

 

         All     internationa  unknown  1.1     unknown          _309    unknown

  unknown



                l_normalized_                                                 



                ratio_INR_                                                 

 

         All     globulin_ser  unknown  2.9     unknown          _3059   unknown

  unknown



                um                                                      

 

         All     alkaline_pho  unknown  89      unknown  U/L     _3      unknown

  unknown



                sphatase_seru                                                 



                m                                                       

 

         All     Sodium_Moles  unknown  140     unknown  mmol/   _2951-2  unknow

n  unknown



                _volume_in_Se                         L                       



                rum_or_Plasma                                                 

 

         All     Protein_Mass  unknown  6.7     unknown  g/dL    _2885-2  unknow

n  unknown



                _volume_in_Se                                                 



                rum_or_Plasma                                                 

 

         All     eosinophil_c  unknown  0.1 10  unknown          _285    unknown

  unknown



                ount_blood         3/UL                                    

 

         All     anion_gap_se  unknown  9.0     unknown          _279    unknown

  unknown



                rum                                                     

 

         All     mean_platele  unknown  8.9     unknown  fL      _2784   unknown

  unknown



                t_volume                                                 

 

         All     Magnesium_Mo  unknown  2.0     unknown  mg/dL   _2601-3  unknow

n  unknown



                les_volume_in                                                 



                _Serum_or_Pla                                                 



                sma                                                     

 

         All     basophil_cou  unknown  0.1 10  unknown          _2427   unknown

  unknown



                nt_blood         3/UL                                    

 

         All     monocyte_cou  unknown  0.6 10  unknown          _2422   unknown

  unknown



                nt_blood         3/UL                                    

 

         All     lymphocyte_c  unknown  1.4 10  unknown          _2420   unknown

  unknown



                ount_blood         3/UL                                    

 

         All     neutrophil_c  unknown  4.5 10  unknown          _2418   unknown

  unknown



                ount_blood         3/UL                                    

 

         All     Glucose_Mass  unknown  92      unknown  mg/dL   _2345-7  unknow

n  unknown



                _volume_in_Se                                                 



                rum_or_Plasma                                                 

 

         All     Globulin_Mas  unknown  2.9     unknown          _2336-6  unknow

n  unknown



                s_volume_in_S                                                 



                vanessa                                                    

 

         All     Creatinine_M  unknown  0.8     unknown  mg/dL   _2160-0  unknow

n  unknown



                ass_volume_in                                                 



                _Serum_or_Pla                                                 



                sma                                                     

 

         All     Chloride_Mol  unknown  105     unknown  mmol/   _-0  unknow

n  unknown



                es_volume_in_                         L                       



                Serum_or_Plas                                                 



                ma                                                      

 

         All     carbon_dioxi  unknown  26      unknown  mmol/   _-  unknow

n  unknown



                de_serum_tota                         L                       



                l                                                       

 

         All     Calcium_Mole  unknown  8.7     unknown  mg/dL   _-  unknow

n  unknown



                s_volume_in_S                                                 



                erum_or_Plasm                                                 



                a                                                       

 

         All     albumin_seru  unknown  3.8     unknown  g/dL    _2      unknown

  unknown



                m                                                       

 

         All     Bilirubin.to  unknown  0.7     unknown  mg/dL   _-  unknow

n  unknown



                tal_Mass_volu                                                 



                me_in_Serum_o                                                 



                r_Plasma                                                 

 

         All     Aspartate_am  unknown  17      unknown  U/L     _-8  unknow

n  unknown



                inotransferas                                                 



                e_Enzymatic_a                                                 



                ctivity_volum                                                 



                e_in_Serum_or                                                 



                _Plasma                                                 

 

         All     Anion_gap_4_  unknown  9.0     unknown          _1863-0  unknow

n  unknown



                in_Serum_or_P                                                 



                lasma                                                   

 

         All     creatinine_s  unknown  0.8     unknown  mg/dL   _18     unknown

  unknown



                vanessa                                                    

 

         All     Alkaline_pho  unknown  89      unknown  U/L     _1783-0  unknow

n  unknown



                sphatase_Enzy                                                 



                matic_activit                                                 



                y_volume_in_B                                                 



                lood                                                    

 

         All     Albumin_Glob  unknown  1.3     unknown          _1759-0  unknow

n  unknown



                ulin_Mass_Rat                                                 



                io_in_Serum_o                                                 



                r_Plasma                                                 

 

         All     Albumin_Mass  unknown  3.8     unknown  g/dL    _1751-7  unknow

n  unknown



                _volume_in_Se                                                 



                rum_or_Plasma                                                 

 

         All     Alanine_amin  unknown  < 10 IU/L  unknown          _1742-6  unk

nown  unknown



                otransferase_                                                 



                Enzymatic_act                                                 



                ivity_volume_                                                 



                in_Serum_or_P                                                 



                lasma                                                   

 

         All     mean_corpusc  unknown  32.1    unknown  g/dL    _17029  unknown

  unknown



                ular_hemoglob                                                 



                in_concentrat                                                 



                ion_rbc                                                 

 

         All     sodium_serum  unknown  140     unknown  mmol/   _159    unknown

  unknown



                                                L                       

 

         All     albumin_glob  unknown  1.3     unknown          _146    unknown

  unknown



                ulin_ratio_se                                                 



                rum                                                     

 

         All     chloride_ser  unknown  105     unknown  mmol/   _13     unknown

  unknown



                um                              L                       

 

         All     calcium_seru  unknown  8.7     unknown  mg/dL   _11     unknown

  unknown



                m                                                       

 

         All     mean_corpusc  unknown  31.1    unknown  pg      _1031   unknown

  unknown



                ular_hemoglob                                                 



                in_RBC                                                  

 

         All     red_blood_ce  unknown  14.8    unknown  %       _1030   unknown

  unknown



                ll_distributi                                                 



                on_width                                                 

 

         All     T       unknown  6.7 X10  unknown          WBC     unknown  unk

nown



                                3/UL                                    

 

         All     T       unknown  0.7     unknown  mg/dL   TOTAL_BI  unknown  un

known



                                                        LI              

 

         All     T       unknown  14.8    unknown  %       RDW     unknown  unkn

own

 

         All     T       unknown  3.70 10  unknown          RBC     unknown  unk

nown



                                6/UL                                    

 

         All     T       unknown  12.4 SECS  unknown          PT      unknown  u

nknown

 

         All     T       unknown  6.7     unknown  g/dL    PRO_TOTA  unknown  un

known



                                                        L               

 

         All     T       unknown  203 10  unknown          PLT     unknown  unkn

own



                                3/UL                                    

 

         All     NEUTROPHILS_  unknown  4.5 10  unknown          NE_     unknown

  unknown



                AUTO_           3/UL                                    

 

         All     T       unknown  4.5 10  unknown          NEUT_AUT  unknown  un

known



                                3/UL                    O_              

 

         All     T       unknown  140     unknown  mmol/   NA      unknown  unkn

own



                                                L                       

 

         All     T       unknown  8.9     unknown  fL      MPV     unknown  unkn

own

 

         All     MONOCYTES_AU  unknown  0.6 10  unknown          MO_     unknown

  unknown



                TO_             3/UL                                    

 

         All     T       unknown  0.6 10  unknown          MONO_AUT  unknown  un

known



                                3/UL                    O_              

 

         All     T       unknown  2.0     unknown  mg/dL   MG      unknown  unkn

own

 

         All     T       unknown  96.8    unknown  fL      MCV     unknown  unkn

own

 

         All     T       unknown  32.1    unknown  g/dL    MCHC    unknown  unkn

own

 

         All     T       unknown  31.1    unknown  pg      MCH     unknown  unkn

own

 

         All     LYMPHOCYTES_  unknown  1.4 10  unknown          LY_     unknown

  unknown



                AUTO_           3/UL                                    

 

         All     T       unknown  1.4 10  unknown          LYMPH_AU  unknown  un

known



                                3/UL                    TO_             

 

         All     T       unknown  4.4     unknown  meq/L   K       unknown  unkn

own

 

         All     T       unknown  1.1     unknown          INR     unknown  unkn

own

 

         All     T       unknown  11.5    unknown  g/dL    HGB     unknown  unkn

own

 

         All     T       unknown  35.8    unknown  %       HCT     unknown  unkn

own

 

         All     T       unknown  92      unknown  mg/dL   GLU     unknown  unkn

own

 

         All     T       unknown  2.9     unknown          GLOB    unknown  unkn

own

 

         All     T       unknown  70      unknown  mL/mi   GFR_-_MD  unknown  un

known



                                                n       RD              

 

         All     GFR_-_MDRD  unknown  70      unknown  mL/mi   GFR     unknown  

unknown



                                                n                       

 

         All     T       unknown  9.0     unknown          GAP     unknown  unkn

own

 

         All     EOSINOPHILS_  unknown  0.1 10  unknown          EO_     unknown

  unknown



                AUTO_           3/UL                                    

 

         All     T       unknown  0.1 10  unknown          EOS_AUTO  unknown  un

known



                                3/UL                    _               

 

         All     T       unknown  0.8     unknown  mg/dL   CREAT   unknown  unkn

own

 

         All     T       unknown  26      unknown  mmol/   CO2     unknown  unkn

own



                                                L                       

 

         All     T       unknown  105     unknown  mmol/   CL      unknown  unkn

own



                                                L                       

 

         All     T       unknown  8.7     unknown  mg/dL   CA      unknown  unkn

own

 

         All     T       unknown  19      unknown  mg/dL   BUN     unknown  unkn

own

 

         All     BILIRUBIN_TO  unknown  0.7     unknown  mg/dL   BILIT   unknown

  unknown



                CONNIE                                                     

 

         All     BASOPHILS_AU  unknown  0.1 10  unknown          BA_     unknown

  unknown



                TO_             3/UL                                    

 

         All     T       unknown  0.1 10  unknown          BASO_AUT  unknown  un

known



                                3/UL                    O_              

 

         All     T       unknown  1.3     unknown          A_G_RATI  unknown  un

known



                                                        O               

 

         All     T       unknown  17      unknown  U/L     AST     unknown  unkn

own

 

         All     T       unknown  < 10 IU/L  unknown          ALT_SGPT  unknown 

 unknown



                                                        _               

 

         All     ALT_ALANINE_  unknown  < 10 IU/L  unknown          ALT     unkn

own  unknown



                AMINOTRANSFER                                                 



                ASE                                                     

 

         All     ALKALINE_PHO  unknown  89      unknown  U/L     ALP     unknown

  unknown



                SPHATASE                                                 

 

         All     T       unknown  89      unknown  U/L     ALK_PHOS  unknown  un

known

 

         All     T       unknown  3.8     unknown  g/dL    ALB     unknown  unkn

own

 

         All     ALBUMIN_GLOB  unknown  1.3     unknown          AGRATIO  unknow

n  unknown



                ULIN_RATIO                                                 

 

         All     urea_nitroge  unknown  17      unknown  mg/dL   _9      unknown

  unknown



                n_blood                                                 

 

         All     Erythrocytes  unknown  3.85 10  unknown          _789-8  unknow

n  unknown



                _volume_in_Bl         6/UL                                    



                ood_by_Automa                                                 



                ted_count                                                 

 

         All     Erythrocyte_  unknown  14.8    unknown  %       _788-0  unknown

  unknown



                distribution_                                                 



                width_Ratio_b                                                 



                y_Automated_c                                                 



                ount                                                    

 

         All     MCV_Entitic_  unknown  95.3    unknown  fL      _787-2  unknown

  unknown



                volume_by_Aut                                                 



                omated_count                                                 

 

         All     MCH_Entitic_  unknown  30.9    unknown  pg      _785-6  unknown

  unknown



                mass_by_Autom                                                 



                ated_count                                                 

 

         All     Platelets_vo  unknown  226 10  unknown          _777-3  unknown

  unknown



                lume_in_Blood         3/UL                                    



                _by_Automated                                                 



                _count                                                  

 

         All     Platelet_mea  unknown  8.8     unknown  fL      _776-5  unknown

  unknown



                n_volume_Enti                                                 



                tic_volume_in                                                 



                _Blood_by_Ree                                                 



                s-Sharon                                                 

 

         All     neutrophil_c  unknown  5.2 10  unknown          _752-6  unknown

  unknown



                ount_blood         3/UL                                    

 

         All     Hemoglobin_M  unknown  11.9    unknown  g/dL    _718-7  unknown

  unknown



                ass_volume_in                                                 



                _Blood                                                  

 

         All     leukocyte_co  unknown  7.3 X10  unknown          _68     unknow

n  unknown



                unt_blood         3/UL                                    

 

         All     erythrocyte_  unknown  3.85 10  unknown          _67     unknow

n  unknown



                RBC_count         6/UL                                    

 

         All     Leukocytes_v  unknown  7.3 X10  unknown          _6690-2  unkno

wn  unknown



                olume_in_Bloo         3/UL                                    



                d_by_Automate                                                 



                d_count                                                 

 

         All     Glomerular_F  unknown  61      unknown  mL/mi   _66455  unknown

  unknown



                iltration_rat                         n                       



                e                                                       

 

         All     platelet_cou  unknown  226 10  unknown          _66     unknown

  unknown



                nt              3/UL                                    

 

         All     hemoglobin_b  unknown  11.9    unknown  g/dL    _65     unknown

  unknown



                lood                                                    

 

         All     hematocrit_b  unknown  36.7    unknown  %       _64     unknown

  unknown



                lood                                                    

 

         All     potassium_bl  unknown  4.1     unknown  meq/L   _6298-4  unknow

n  unknown



                ood                                                     

 

         All    Glomerular_fi  unknown  61      unknown  mL/mi   _48642-3  unkno

wn  unknown



                ltration_rate                         n                       



                _1.73_sq_M.pr                                                 



                edicted_among                                                 



                _non-blacks_V                                                 



                olume_Rate_Ar                                                 



                ea_in_Serum_P                                                 



                lasma_or_Bloo                                                 



                d_by_Creatini                                                 



                ne-based_form                                                 



                ula_MDRD_                                                 

 

         All     Hematocrit_V  unknown  36.7    unknown  %       _4544-3  unknow

n  unknown



                olume_Fractio                                                 



                n_of_Blood_by                                                 



                _Automated_co                                                 



                unt                                                     

 

         All     bilirubin_se  unknown  0.7     unknown  mg/dL   _43     unknown

  unknown



                rum_total                                                 

 

         All     alanine_amin  unknown  < 10 IU/L  unknown          _40     unkn

own  unknown



                otransferase_                                                 



                SGPT_serum                                                 

 

         All     carbon_dioxi  unknown  27      unknown  mmol/   _3962   unknown

  unknown



                de_serum_tota                         L                       



                l                                                       

 

         All     aspartate_am  unknown  15      unknown  U/L     _39     unknown

  unknown



                inotransferas                                                 



                e_SGOT_serum                                                 

 

         All     protein_tota  unknown  6.6     unknown  g/dL    _36     unknown

  unknown



                l_serum                                                 

 

         All     blood_glucos  unknown  85      unknown  mg/dL   _3565   unknown

  unknown



                e                                                       

 

         All     potassium_bl  unknown  4.1     unknown  meq/L   _3483   unknown

  unknown



                ood                                                     

 

         All     mean_corpusc  unknown  95.3    unknown  fL      _315    unknown

  unknown



                ular_volume_R                                                 



                BC                                                      

 

         All     Urea_nitroge  unknown  17      unknown  mg/dL   _3094-0  unknow

n  unknown



                n_Mass_volume                                                 



                _in_Serum_or_                                                 



                Plasma                                                  

 

         All     globulin_ser  unknown  2.7     unknown          _3059   unknown

  unknown



                um                                                      

 

         All     alkaline_pho  unknown  97      unknown  U/L     _3      unknown

  unknown



                sphatase_seru                                                 



                m                                                       

 

         All     Sodium_Moles  unknown  141     unknown  mmol/   _2951-2  unknow

n  unknown



                _volume_in_Se                         L                       



                rum_or_Plasma                                                 

 

         All     Protein_Mass  unknown  6.6     unknown  g/dL    _2885-2  unknow

n  unknown



                _volume_in_Se                                                 



                rum_or_Plasma                                                 

 

         All     anion_gap_se  unknown  9.0     unknown          _279    unknown

  unknown



                rum                                                     

 

         All     mean_platele  unknown  8.8     unknown  fL      _2784   unknown

  unknown



                t_volume                                                 

 

         All     neutrophil_c  unknown  5.2 10  unknown          _2418   unknown

  unknown



                ount_blood         3/UL                                    

 

         All     Glucose_Mass  unknown  85      unknown  mg/dL   _2345-7  unknow

n  unknown



                _volume_in_Se                                                 



                rum_or_Plasma                                                 

 

         All     Globulin_Mas  unknown  2.7     unknown          _2336-6  unknow

n  unknown



                s_volume_in_S                                                 



                vanessa                                                    

 

         All     Creatinine_M  unknown  0.9     unknown  mg/dL   _2160-0  unknow

n  unknown



                ass_volume_in                                                 



                _Serum_or_Pla                                                 



                sma                                                     

 

         All     Chloride_Mol  unknown  105     unknown  mmol/   _2075-0  unknow

n  unknown



                es_volume_in_                         L                       



                Serum_or_Plas                                                 



                ma                                                      

 

         All     carbon_dioxi  unknown  27      unknown  mmol/   _8-9  unknow

n  unknown



                de_serum_tota                         L                       



                l                                                       

 

         All     Calcium_Mole  unknown  8.7     unknown  mg/dL   _-8  unknow

n  unknown



                s_volume_in_S                                                 



                erum_or_Plasm                                                 



                a                                                       

 

         All     albumin_seru  unknown  3.9     unknown  g/dL    _2      unknown

  unknown



                m                                                       

 

         All     Bilirubin.to  unknown  0.7     unknown  mg/dL   _-2  unknow

n  unknown



                tal_Mass_volu                                                 



                me_in_Serum_o                                                 



                r_Plasma                                                 

 

         All     Aspartate_am  unknown  15      unknown  U/L     _1920-8  unknow

n  unknown



                inotransferas                                                 



                e_Enzymatic_a                                                 



                ctivity_volum                                                 



                e_in_Serum_or                                                 



                _Plasma                                                 

 

         All     Anion_gap_4_  unknown  9.0     unknown          _1863-0  unknow

n  unknown



                in_Serum_or_P                                                 



                lasma                                                   

 

         All     creatinine_s  unknown  0.9     unknown  mg/dL   _18     unknown

  unknown



                vanessa                                                    

 

         All     Alkaline_pho  unknown  97      unknown  U/L     _1783-0  unknow

n  unknown



                sphatase_Enzy                                                 



                matic_activit                                                 



                y_volume_in_B                                                 



                lood                                                    

 

         All     Albumin_Glob  unknown  1.4     unknown          _1759-0  unknow

n  unknown



                ulin_Mass_Rat                                                 



                io_in_Serum_o                                                 



                r_Plasma                                                 

 

         All     Albumin_Mass  unknown  3.9     unknown  g/dL    _1751-7  unknow

n  unknown



                _volume_in_Se                                                 



                rum_or_Plasma                                                 

 

         All     Alanine_amin  unknown  < 10 IU/L  unknown          _1742-6  unk

nown  unknown



                otransferase_                                                 



                Enzymatic_act                                                 



                ivity_volume_                                                 



                in_Serum_or_P                                                 



                lasma                                                   

 

         All     mean_corpusc  unknown  32.4    unknown  g/dL    _17029  unknown

  unknown



                ular_hemoglob                                                 



                in_concentrat                                                 



                ion_rbc                                                 

 

         All     sodium_serum  unknown  141     unknown  mmol/   _159    unknown

  unknown



                                                L                       

 

         All     albumin_glob  unknown  1.4     unknown          _146    unknown

  unknown



                ulin_ratio_se                                                 



                rum                                                     

 

         All     chloride_ser  unknown  105     unknown  mmol/   _13     unknown

  unknown



                um                              L                       

 

         All     calcium_seru  unknown  8.7     unknown  mg/dL   _11     unknown

  unknown



                m                                                       

 

         All     mean_corpusc  unknown  30.9    unknown  pg      _1031   unknown

  unknown



                ular_hemoglob                                                 



                in_RBC                                                  

 

         All     red_blood_ce  unknown  14.8    unknown  %       _1030   unknown

  unknown



                ll_distributi                                                 



                on_width                                                 

 

         All     T       unknown  7.3 X10  unknown          WBC     unknown  unk

nown



                                3/UL                                    

 

         All     T       unknown  0.7     unknown  mg/dL   TOTAL_BI  unknown  un

known



                                                        LI              

 

         All     T       unknown  14.8    unknown  %       RDW     unknown  unkn

own

 

         All     T       unknown  3.85 10  unknown          RBC     unknown  unk

nown



                                6/UL                                    

 

         All     T       unknown  6.6     unknown  g/dL    PRO_TOTA  unknown  un

known



                                                        L               

 

         All     T       unknown  226 10  unknown          PLT     unknown  unkn

own



                                3/UL                                    

 

         All     NEUTROPHILS_  unknown  5.2 10  unknown          NE_     unknown

  unknown



                AUTO_           3/UL                                    

 

         All     T       unknown  5.2 10  unknown          NEUT_AUT  unknown  un

known



                                3/UL                    O_              

 

         All     T       unknown  141     unknown  mmol/   NA      unknown  unkn

own



                                                L                       

 

         All     T       unknown  8.8     unknown  fL      MPV     unknown  unkn

own

 

         All     T       unknown  95.3    unknown  fL      MCV     unknown  unkn

own

 

         All     T       unknown  32.4    unknown  g/dL    MCHC    unknown  unkn

own

 

         All     T       unknown  30.9    unknown  pg      MCH     unknown  unkn

own

 

         All     T       unknown  4.1     unknown  meq/L   K       unknown  unkn

own

 

         All     T       unknown  11.9    unknown  g/dL    HGB     unknown  unkn

own

 

         All     T       unknown  36.7    unknown  %       HCT     unknown  unkn

own

 

         All     T       unknown  85      unknown  mg/dL   GLU     unknown  unkn

own

 

         All     T       unknown  2.7     unknown          GLOB    unknown  unkn

own

 

         All     T       unknown  61      unknown  mL/mi   GFR_-_MD  unknown  un

known



                                                n       RD              

 

         All     GFR_-_MDRD  unknown  61      unknown  mL/mi   GFR     unknown  

unknown



                                                n                       

 

         All     T       unknown  9.0     unknown          GAP     unknown  unkn

own

 

         All     T       unknown  0.9     unknown  mg/dL   CREAT   unknown  unkn

own

 

         All     T       unknown  27      unknown  mmol/   CO2     unknown  unkn

own



                                                L                       

 

         All     T       unknown  105     unknown  mmol/   CL      unknown  unkn

own



                                                L                       

 

         All     T       unknown  8.7     unknown  mg/dL   CA      unknown  unkn

own

 

         All     T       unknown  17      unknown  mg/dL   BUN     unknown  unkn

own

 

         All     BILIRUBIN_TO  unknown  0.7     unknown  mg/dL   BILIT   unknown

  unknown



                CONNIE                                                     

 

         All     T       unknown  1.4     unknown          A_G_RATI  unknown  un

known



                                                        O               

 

         All     T       unknown  15      unknown  U/L     AST     unknown  unkn

own

 

         All     T       unknown  < 10 IU/L  unknown          ALT_SGPT  unknown 

 unknown



                                                        _               

 

         All     ALT_ALANINE_  unknown  < 10 IU/L  unknown          ALT     unkn

own  unknown



                AMINOTRANSFER                                                 



                ASE                                                     

 

         All     ALKALINE_PHO  unknown  97      unknown  U/L     ALP     unknown

  unknown



                SPHATASE                                                 

 

         All     T       unknown  97      unknown  U/L     ALK_PHOS  unknown  un

known

 

         All     T       unknown  3.9     unknown  g/dL    ALB     unknown  unkn

own

 

         All     ALBUMIN_GLOB  unknown  1.4     unknown          AGRATIO  unknow

n  unknown



                ULIN_RATIO                                                 

 

         All     urea_nitroge  unknown  15      unknown  mg/dL   _9      unknown

  unknown



                n_blood                                                 

 

         All     Erythrocytes  unknown  3.75 10  unknown          _789-8  unknow

n  unknown



                _volume_in_Bl         6/UL                                    



                ood_by_Automa                                                 



                ted_count                                                 

 

         All     Erythrocyte_  unknown  14.7    unknown  %       _788-0  unknown

  unknown



                distribution_                                                 



                width_Ratio_b                                                 



                y_Automated_c                                                 



                ount                                                    

 

         All     MCV_Entitic_  unknown  95.5    unknown  fL      _787-2  unknown

  unknown



                volume_by_Aut                                                 



                omated_count                                                 

 

         All     MCH_Entitic_  unknown  30.9    unknown  pg      _785-6  unknown

  unknown



                mass_by_Autom                                                 



                ated_count                                                 

 

         All     Platelets_vo  unknown  194 10  unknown          _777-3  unknown

  unknown



                lume_in_Blood         3/UL                                    



                _by_Automated                                                 



                _count                                                  

 

         All     Platelet_mea  unknown  9.3     unknown  fL      _776-5  unknown

  unknown



                n_volume_Enti                                                 



                tic_volume_in                                                 



                _Blood_by_Ree                                                 



                s-Sharon                                                 

 

         All     neutrophil_c  unknown  4.9 10  unknown          _752-6  unknown

  unknown



                ount_blood         3/UL                                    

 

         All     monocyte_cou  unknown  0.6 10  unknown          _743-5  unknown

  unknown



                nt_blood         3/UL                                    

 

         All     lymphocyte_c  unknown  1.1 10  unknown          _732-8  unknown

  unknown



                ount_blood         3/UL                                    

 

         All     Hemoglobin_M  unknown  11.6    unknown  g/dL    _718-7  unknown

  unknown



                ass_volume_in                                                 



                _Blood                                                  

 

         All     eosinophil_c  unknown  0.1 10  unknown          _712-0  unknown

  unknown



                ount_blood         3/UL                                    

 

         All     Basophils_vo  unknown  0.1 10  unknown          _705-4  unknown

  unknown



                lume_in_Blood         3/UL                                    



                _by_Manual_co                                                 



                unt                                                     

 

         All     leukocyte_co  unknown  6.9 X10  unknown          _68     unknow

n  unknown



                unt_blood         3/UL                                    

 

         All     erythrocyte_  unknown  3.75 10  unknown          _67     unknow

n  unknown



                RBC_count         6/UL                                    

 

         All     Leukocytes_v  unknown  6.9 X10  unknown          _6690-2  unkno

wn  unknown



                olume_in_Bloo         3/UL                                    



                d_by_Automate                                                 



                d_count                                                 

 

         All     Glomerular_F  unknown  81      unknown  mL/mi   _66455  unknown

  unknown



                iltration_rat                         n                       



                e                                                       

 

         All     platelet_cou  unknown  194 10  unknown          _66     unknown

  unknown



                nt              3/UL                                    

 

         All     hemoglobin_b  unknown  11.6    unknown  g/dL    _65     unknown

  unknown



                lood                                                    

 

         All     hematocrit_b  unknown  35.8    unknown  %       _64     unknown

  unknown



                lood                                                    

 

         All     INR_in_Plate  unknown  1.1     unknown          _6301-6  unknow

n  unknown



                let_poor_plas                                                 



                ma_by_Coagula                                                 



                tion_assay                                                 

 

         All     potassium_bl  unknown  4.4     unknown  meq/L   _6298-4  unknow

n  unknown



                ood                                                     

 

         All     Prothrombin_  unknown  12.8 SECS  unknown          _5902-2  unk

nown  unknown



                time_PT_                                                 

 

         All     WBC_urine_on  unknown  4-5 /HPF  unknown          _5821-4  unkn

own  unknown



                _microscopy                                                 

 

         All     Urobilinogen  unknown  0.2     unknown          _5818-0  unknow

n  unknown



                _Presence_in_         (NORMAL)                                 



                Urine_by_Test                                                 



                _strip                                                  

 

         All     Specific_gra  unknown  1.020   unknown          _5811-5  unknow

n  unknown



                vity_of_Urine                                                 



                _by_Test_stri                                                 



                p                                                       

 

         All     Nitrite_Pres  unknown  NEGATIVE  unknown          _5802-4  unkn

own  unknown



                ence_in_Urine                                                 



                _by_Test_stri                                                 



                p                                                       

 

         All     Leukocyte_es  unknown  TRACE   unknown          _5799-2  unknow

n  unknown



                terase_Presen                                                 



                ce_in_Urine_b                                                 



                y_Test_strip                                                 

 

         All     Ketones_Mass  unknown  NEGATIVE  unknown          _5797-6  unkn

own  unknown



                _volume_in_Ur                                                 



                ine_by_Test_s                                                 



                trip                                                    

 

         All     Color_of_Uri  unknown  YELLOW  unknown          _5778-6  unknow

n  unknown



                ne                                                      

 

         All     Bilirubin.to  unknown  NEGATIVE  unknown          _5770-3  unkn

own  unknown



                tal_Presence_                                                 



                in_Urine_by_T                                                 



                est_strip                                                 

 

         All     clarity_urin  unknown  CLEAR   unknown          _5589   unknown

  unknown



                e_point                                                 

 

         All     pH_study_of_  unknown  7.0     unknown          _51641  unknown

  unknown



                acidity                                                 

 

         All     prothrombin_  unknown  12.8 SECS  unknown          _50     unkn

own  unknown



                time_patient_                                                 

 

         All    Glomerular_fi  unknown  81      unknown  mL/mi   _48642-3  unkno

wn  unknown



                ltration_rate                         n                       



                _1.73_sq_M.pr                                                 



                edicted_among                                                 



                _non-blacks_V                                                 



                olume_Rate_Ar                                                 



                ea_in_Serum_P                                                 



                lasma_or_Bloo                                                 



                d_by_Creatini                                                 



                ne-based_form                                                 



                ula_MDRD_                                                 

 

         All     Hematocrit_V  unknown  35.8    unknown  %       _4544-3  unknow

n  unknown



                olume_Fractio                                                 



                n_of_Blood_by                                                 



                _Automated_co                                                 



                unt                                                     

 

         All     bilirubin_se  unknown  0.8     unknown  mg/dL   _43     unknown

  unknown



                rum_total                                                 

 

         All     alanine_amin  unknown  < 10 IU/L  unknown          _40     unkn

own  unknown



                otransferase_                                                 



                SGPT_serum                                                 

 

         All     carbon_dioxi  unknown  26      unknown  mmol/   _3962   unknown

  unknown



                de_serum_tota                         L                       



                l                                                       

 

         All     aspartate_am  unknown  14      unknown  U/L     _39     unknown

  unknown



                inotransferas                                                 



                e_SGOT_serum                                                 

 

         All     protein_tota  unknown  6.5     unknown  g/dL    _36     unknown

  unknown



                l_serum                                                 

 

         All     blood_glucos  unknown  94      unknown  mg/dL   _3565   unknown

  unknown



                e                                                       

 

         All     potassium_bl  unknown  4.4     unknown  meq/L   _3483   unknown

  unknown



                ood                                                     

 

         All     glucose_urin  unknown  NEGATIVE  unknown          _3369   unkno

wn  unknown



                e               mg/dL                                   

 

         All     leukocyte_es  unknown  TRACE   unknown          _327    unknown

  unknown



                terase_urine_                                                 



                by_dipstick                                                 

 

         All     urobilinogen  unknown  0.2     unknown          _326    unknown

  unknown



                _urine_semiqu         (NORMAL)                                 



                antitative_di                                                 



                pstick_                                                 

 

         All     specific_gra  unknown  1.020   unknown          _325    unknown

  unknown



                vity_urine                                                 

 

         All     nitrite_urin  unknown  NEGATIVE  unknown          _323    unkno

wn  unknown



                e_semiquantit                                                 



                ative                                                   

 

         All     ketones_urin  unknown  NEGATIVE  unknown          _322    unkno

wn  unknown



                e_by_test_str                                                 



                ip                                                      

 

         All     bilirubin_ur  unknown  NEGATIVE  unknown          _319    unkno

wn  unknown



                ine                                                     

 

         All     mean_corpusc  unknown  95.5    unknown  fL      _315    unknown

  unknown



                ular_volume_R                                                 



                BC                                                      

 

         All     Urea_nitroge  unknown  15      unknown  mg/dL   _3094-0  unknow

n  unknown



                n_Mass_volume                                                 



                _in_Serum_or_                                                 



                Plasma                                                  

 

         All     internationa  unknown  1.1     unknown          _309    unknown

  unknown



                l_normalized_                                                 



                ratio_INR_                                                 

 

         All     globulin_ser  unknown  2.7     unknown          _3059   unknown

  unknown



                um                                                      

 

         All     alkaline_pho  unknown  97      unknown  U/L     _3      unknown

  unknown



                sphatase_seru                                                 



                m                                                       

 

         All     Sodium_Moles  unknown  138     unknown  mmol/   _2951-2  unknow

n  unknown



                _volume_in_Se                         L                       



                rum_or_Plasma                                                 

 

         All     Protein_Mass  unknown  6.5     unknown  g/dL    _2885-2  unknow

n  unknown



                _volume_in_Se                                                 



                rum_or_Plasma                                                 

 

         All     eosinophil_c  unknown  0.1 10  unknown          _285    unknown

  unknown



                ount_blood         3/UL                                    

 

         All     anion_gap_se  unknown  9.0     unknown          _279    unknown

  unknown



                rum                                                     

 

         All     mean_platele  unknown  9.3     unknown  fL      _2784   unknown

  unknown



                t_volume                                                 

 

         All     urine_color  unknown  YELLOW  unknown          _2751   unknown 

 unknown

 

         All     basophil_cou  unknown  0.1 10  unknown          _2427   unknown

  unknown



                nt_blood         3/UL                                    

 

         All     monocyte_cou  unknown  0.6 10  unknown          _2422   unknown

  unknown



                nt_blood         3/UL                                    

 

         All     lymphocyte_c  unknown  1.1 10  unknown          _2420   unknown

  unknown



                ount_blood         3/UL                                    

 

         All     neutrophil_c  unknown  4.9 10  unknown          _2418   unknown

  unknown



                ount_blood         3/UL                                    

 

         All     Glucose_Mass  unknown  NEGATIVE  unknown          _2350-7  unkn

own  unknown



                _volume_in_Ur         mg/dL                                   



                ine                                                     

 

         All     Glucose_Mass  unknown  94      unknown  mg/dL   _2345-7  unknow

n  unknown



                _volume_in_Se                                                 



                rum_or_Plasma                                                 

 

         All     Globulin_Mas  unknown  2.7     unknown          _2336-6  unknow

n  unknown



                s_volume_in_S                                                 



                vanessa                                                    

 

         All     Creatinine_M  unknown  0.7     unknown  mg/dL   _2160-0  unknow

n  unknown



                ass_volume_in                                                 



                _Serum_or_Pla                                                 



                sma                                                     

 

         All     Chloride_Mol  unknown  103     unknown  mmol/   _-0  unknow

n  unknown



                es_volume_in_                         L                       



                Serum_or_Plas                                                 



                ma                                                      

 

         All     carbon_dioxi  unknown  26      unknown  mmol/   _-  unknow

n  unknown



                de_serum_tota                         L                       



                l                                                       

 

         All     Calcium_Mole  unknown  8.7     unknown  mg/dL   _-  unknow

n  unknown



                s_volume_in_S                                                 



                erum_or_Plasm                                                 



                a                                                       

 

         All     albumin_seru  unknown  3.8     unknown  g/dL    _2      unknown

  unknown



                m                                                       

 

         All     Bilirubin.to  unknown  0.8     unknown  mg/dL   _-  unknow

n  unknown



                tal_Mass_volu                                                 



                me_in_Serum_o                                                 



                r_Plasma                                                 

 

         All     Aspartate_am  unknown  14      unknown  U/L     _-8  unknow

n  unknown



                inotransferas                                                 



                e_Enzymatic_a                                                 



                ctivity_volum                                                 



                e_in_Serum_or                                                 



                _Plasma                                                 

 

         All     Anion_gap_4_  unknown  9.0     unknown          _1863-0  unknow

n  unknown



                in_Serum_or_P                                                 



                lasma                                                   

 

         All     creatinine_s  unknown  0.7     unknown  mg/dL   _18     unknown

  unknown



                vanessa                                                    

 

         All     Alkaline_pho  unknown  97      unknown  U/L     _1783-0  unknow

n  unknown



                sphatase_Enzy                                                 



                matic_activit                                                 



                y_volume_in_B                                                 



                lood                                                    

 

         All     Albumin_Glob  unknown  1.4     unknown          _1759-0  unknow

n  unknown



                ulin_Mass_Rat                                                 



                io_in_Serum_o                                                 



                r_Plasma                                                 

 

         All     Albumin_Mass  unknown  3.8     unknown  g/dL    _1751-7  unknow

n  unknown



                _volume_in_Se                                                 



                rum_or_Plasma                                                 

 

         All     Alanine_amin  unknown  < 10 IU/L  unknown          _1742-6  unk

nown  unknown



                otransferase_                                                 



                Enzymatic_act                                                 



                ivity_volume_                                                 



                in_Serum_or_P                                                 



                lasma                                                   

 

         All     mean_corpusc  unknown  32.4    unknown  g/dL    _17029  unknown

  unknown



                ular_hemoglob                                                 



                in_concentrat                                                 



                ion_rbc                                                 

 

         All     sodium_serum  unknown  138     unknown  mmol/   _159    unknown

  unknown



                                                L                       

 

         All     albumin_glob  unknown  1.4     unknown          _146    unknown

  unknown



                ulin_ratio_se                                                 



                rum                                                     

 

         All     chloride_ser  unknown  103     unknown  mmol/   _13     unknown

  unknown



                um                              L                       

 

         All     calcium_seru  unknown  8.7     unknown  mg/dL   _11     unknown

  unknown



                m                                                       

 

         All     mean_corpusc  unknown  30.9    unknown  pg      _1031   unknown

  unknown



                ular_hemoglob                                                 



                in_RBC                                                  

 

         All     red_blood_ce  unknown  14.7    unknown  %       _1030   unknown

  unknown



                ll_distributi                                                 



                on_width                                                 

 

         All     WBC_urine_on  unknown  4-5 /HPF  unknown          _1016   unkno

wn  unknown



                _microscopy                                                 

 

         All     T       unknown  6.9 X10  unknown          WBC     unknown  unk

nown



                                3/UL                                    

 

         All     WBC_URINE  unknown  4-5 /HPF  unknown          UWBC    unknown 

 unknown

 

         All     UROBILINOGEN  unknown  0.2     unknown          UUROBIL  unknow

n  unknown



                _URINE          (NORMAL)                                 

 

         All     SPECIFIC_GRA  unknown  1.020   unknown          USG     unknown

  unknown



                VITY_URINE                                                 

 

         All     T       unknown  4-5 /HPF  unknown          UR_WBC  unknown  un

known

 

         All     T       unknown  0.2     unknown          UR_URO  unknown  unkn

own



                                (NORMAL)                                 

 

         All     T       unknown  1.020   unknown          UR_SG   unknown  unkn

own

 

         All     T       unknown  7.0     unknown          UR_PH   unknown  unkn

own

 

         All     T       unknown  NEGATIVE  unknown          UR_NIT  unknown  un

known

 

         All     T       unknown  TRACE   unknown          UR_LEU_E  unknown  un

known



                                                        STERASE         

 

         All     T       unknown  NEGATIVE  unknown          UR_KETO_  unknown  

unknown



                                                        UA_             

 

         All     T       unknown  NEGATIVE  unknown          UR_GLU  unknown  un

known



                                mg/dL                                   

 

         All     T       unknown  YELLOW  unknown          UR_COLOR  unknown  un

known

 

         All     T       unknown  CLEAR   unknown          UR_CLARI  unknown  un

known



                                                        TY              

 

         All     T       unknown  NEGATIVE  unknown          UR_BILI  unknown  u

nknown

 

         All     PH_URINE  unknown  7.0     unknown          UPH     unknown  un

known

 

         All     NITRITE_URIN  unknown  NEGATIVE  unknown          UNITRITE  unk

nown  unknown



                E                                                       

 

         All     LEUKOCYTE_ES  unknown  TRACE   unknown          ULEUK   unknown

  unknown



                TERASE_URINE                                                 

 

         All     KETONES_URIN  unknown  NEGATIVE  unknown          UKET    unkno

wn  unknown



                E_UA_                                                   

 

         All     GLUCOSE_URIN  unknown  NEGATIVE  unknown          UGLUC   unkno

wn  unknown



                E_UA_           mg/dL                                   

 

         All     COLOR_URINE  unknown  YELLOW  unknown          UCOL    unknown 

 unknown

 

         All     CLARITY_URIN  unknown  CLEAR   unknown          UCLAR   unknown

  unknown



                E                                                       

 

         All     BILIRUBIN_UR  unknown  NEGATIVE  unknown          UBIL    unkno

wn  unknown



                INE                                                     

 

         All     T       unknown  0.8     unknown  mg/dL   TOTAL_BI  unknown  un

known



                                                        LI              

 

         All     T       unknown  14.7    unknown  %       RDW     unknown  unkn

own

 

         All     T       unknown  3.75 10  unknown          RBC     unknown  unk

nown



                                6/UL                                    

 

         All     T       unknown  12.8 SECS  unknown          PT      unknown  u

nknown

 

         All     T       unknown  6.5     unknown  g/dL    PRO_TOTA  unknown  un

known



                                                        L               

 

         All     T       unknown  194 10  unknown          PLT     unknown  unkn

own



                                3/UL                                    

 

         All     NEUTROPHILS_  unknown  4.9 10  unknown          NE_     unknown

  unknown



                AUTO_           3/UL                                    

 

         All     T       unknown  4.9 10  unknown          NEUT_AUT  unknown  un

known



                                3/UL                    O_              

 

         All     T       unknown  138     unknown  mmol/   NA      unknown  unkn

own



                                                L                       

 

         All     T       unknown  9.3     unknown  fL      MPV     unknown  unkn

own

 

         All     MONOCYTES_AU  unknown  0.6 10  unknown          MO_     unknown

  unknown



                TO_             3/UL                                    

 

         All     T       unknown  0.6 10  unknown          MONO_AUT  unknown  un

known



                                3/UL                    O_              

 

         All     T       unknown  95.5    unknown  fL      MCV     unknown  unkn

own

 

         All     T       unknown  32.4    unknown  g/dL    MCHC    unknown  unkn

own

 

         All     T       unknown  30.9    unknown  pg      MCH     unknown  unkn

own

 

         All     LYMPHOCYTES_  unknown  1.1 10  unknown          LY_     unknown

  unknown



                AUTO_           3/UL                                    

 

         All     T       unknown  1.1 10  unknown          LYMPH_AU  unknown  un

known



                                3/UL                    TO_             

 

         All     T       unknown  4.4     unknown  meq/L   K       unknown  unkn

own

 

         All     T       unknown  1.1     unknown          INR     unknown  unkn

own

 

         All     T       unknown  11.6    unknown  g/dL    HGB     unknown  unkn

own

 

         All     T       unknown  35.8    unknown  %       HCT     unknown  unkn

own

 

         All     T       unknown  94      unknown  mg/dL   GLU     unknown  unkn

own

 

         All     T       unknown  2.7     unknown          GLOB    unknown  unkn

own

 

         All     T       unknown  81      unknown  mL/mi   GFR_-_MD  unknown  un

known



                                                n       RD              

 

         All     GFR_-_MDRD  unknown  81      unknown  mL/mi   GFR     unknown  

unknown



                                                n                       

 

         All     T       unknown  9.0     unknown          GAP     unknown  unkn

own

 

         All     EOSINOPHILS_  unknown  0.1 10  unknown          EO_     unknown

  unknown



                AUTO_           3/UL                                    

 

         All     T       unknown  0.1 10  unknown          EOS_AUTO  unknown  un

known



                                3/UL                    _               

 

         All     T       unknown  0.7     unknown  mg/dL   CREAT   unknown  unkn

own

 

         All     T       unknown  26      unknown  mmol/   CO2     unknown  unkn

own



                                                L                       

 

         All     T       unknown  103     unknown  mmol/   CL      unknown  unkn

own



                                                L                       

 

         All     T       unknown  8.7     unknown  mg/dL   CA      unknown  unkn

own

 

         All     T       unknown  15      unknown  mg/dL   BUN     unknown  unkn

own

 

         All     BILIRUBIN_TO  unknown  0.8     unknown  mg/dL   BILIT   unknown

  unknown



                CONNIE                                                     

 

         All     BASOPHILS_AU  unknown  0.1 10  unknown          BA_     unknown

  unknown



                TO_             3/UL                                    

 

         All     T       unknown  0.1 10  unknown          BASO_AUT  unknown  un

known



                                3/UL                    O_              

 

         All     T       unknown  1.4     unknown          A_G_RATI  unknown  un

known



                                                        O               

 

         All     T       unknown  14      unknown  U/L     AST     unknown  unkn

own

 

         All     T       unknown  < 10 IU/L  unknown          ALT_SGPT  unknown 

 unknown



                                                        _               

 

         All     ALT_ALANINE_  unknown  < 10 IU/L  unknown          ALT     unkn

own  unknown



                AMINOTRANSFER                                                 



                ASE                                                     

 

         All     ALKALINE_PHO  unknown  97      unknown  U/L     ALP     unknown

  unknown



                SPHATASE                                                 

 

         All     T       unknown  97      unknown  U/L     ALK_PHOS  unknown  un

known

 

         All     T       unknown  3.8     unknown  g/dL    ALB     unknown  unkn

own

 

         All     ALBUMIN_GLOB  unknown  1.4     unknown          AGRATIO  unknow

n  unknown



                ULIN_RATIO                                                 

 

         All     urea_nitroge  unknown  17      unknown  mg/dL   _9      unknown

  unknown



                n_blood                                                 

 

         All     Erythrocytes  unknown  3.33 10  unknown          _789-8  unknow

n  unknown



                _volume_in_Bl         6/UL                                    



                ood_by_Automa                                                 



                ted_count                                                 

 

         All     Erythrocyte_  unknown  15.0    unknown  %       _788-0  unknown

  unknown



                distribution_                                                 



                width_Ratio_b                                                 



                y_Automated_c                                                 



                ount                                                    

 

         All     MCV_Entitic_  unknown  96.4    unknown  fL      _787-2  unknown

  unknown



                volume_by_Aut                                                 



                omated_count                                                 

 

         All     MCH_Entitic_  unknown  30.9    unknown  pg      _785-6  unknown

  unknown



                mass_by_Autom                                                 



                ated_count                                                 

 

         All     Platelets_vo  unknown  163 10  unknown          _777-3  unknown

  unknown



                lume_in_Blood         3/UL                                    



                _by_Automated                                                 



                _count                                                  

 

         All     Platelet_mea  unknown  8.6     unknown  fL      _776-5  unknown

  unknown



                n_volume_Enti                                                 



                tic_volume_in                                                 



                _Blood_by_Ree                                                 



                s-Sharon                                                 

 

         All     neutrophil_c  unknown  3.7 10  unknown          _752-6  unknown

  unknown



                ount_blood         3/UL                                    

 

         All     monocyte_cou  unknown  0.5 10  unknown          _743-5  unknown

  unknown



                nt_blood         3/UL                                    

 

         All     lymphocyte_c  unknown  1.2 10  unknown          _732-8  unknown

  unknown



                ount_blood         3/UL                                    

 

         All     Hemoglobin_M  unknown  10.3    unknown  g/dL    _718-7  unknown

  unknown



                ass_volume_in                                                 



                _Blood                                                  

 

         All     eosinophil_c  unknown  0.1 10  unknown          _712-0  unknown

  unknown



                ount_blood         3/UL                                    

 

         All     Basophils_vo  unknown  0.0 10  unknown          _705-4  unknown

  unknown



                lume_in_Blood         3/UL                                    



                _by_Manual_co                                                 



                unt                                                     

 

         All     leukocyte_co  unknown  5.6 X10  unknown          _68     unknow

n  unknown



                unt_blood         3/UL                                    

 

         All     erythrocyte_  unknown  3.33 10  unknown          _67     unknow

n  unknown



                RBC_count         6/UL                                    

 

         All     Leukocytes_v  unknown  5.6 X10  unknown          _6690-2  unkno

wn  unknown



                olume_in_Bloo         3/UL                                    



                d_by_Automate                                                 



                d_count                                                 

 

         All     Glomerular_F  unknown  70      unknown  mL/mi   _66455  unknown

  unknown



                iltration_rat                         n                       



                e                                                       

 

         All     platelet_cou  unknown  163 10  unknown          _66     unknown

  unknown



                nt              3/UL                                    

 

         All     hemoglobin_b  unknown  10.3    unknown  g/dL    _65     unknown

  unknown



                lood                                                    

 

         All     hematocrit_b  unknown  32.1    unknown  %       _64     unknown

  unknown



                lood                                                    

 

         All     potassium_bl  unknown  4.1     unknown  meq/L   _6298-4  unknow

n  unknown



                ood                                                     

 

         All     WBC_urine_on  unknown  -   unknown          _5821-4  unknow

n  unknown



                _microscopy         /HPF                                    

 

         All     Urobilinogen  unknown  0.2     unknown          _5818-0  unknow

n  unknown



                _Presence_in_         (NORMAL)                                 



                Urine_by_Test                                                 



                _strip                                                  

 

         All     Specific_gra  unknown  1.020   unknown          _5811-5  unknow

n  unknown



                vity_of_Urine                                                 



                _by_Test_stri                                                 



                p                                                       

 

         All     Nitrite_Pres  unknown  NEGATIVE  unknown          _5802-4  unkn

own  unknown



                ence_in_Urine                                                 



                _by_Test_stri                                                 



                p                                                       

 

         All     Leukocyte_es  unknown  MODERATE  unknown          _5799-2  unkn

own  unknown



                terase_Presen                                                 



                ce_in_Urine_b                                                 



                y_Test_strip                                                 

 

         All     Ketones_Mass  unknown  NEGATIVE  unknown          _5797-6  unkn

own  unknown



                _volume_in_Ur                                                 



                ine_by_Test_s                                                 



                trip                                                    

 

         All     Color_of_Uri  unknown  DARK    unknown          _5778-6  unknow

n  unknown



                ne              YELLOW                                  

 

         All     Bilirubin.to  unknown  NEGATIVE  unknown          _5770-3  unkn

own  unknown



                tal_Presence_                                                 



                in_Urine_by_T                                                 



                est_strip                                                 

 

         All     clarity_urin  unknown  HAZY    unknown          _5589   unknown

  unknown



                e_point                                                 

 

         All     pH_study_of_  unknown  6.0     unknown          _51641  unknown

  unknown



                acidity                                                 

 

         All    Glomerular_fi  unknown  70      unknown  mL/mi   _48642-3  unkno

wn  unknown



                ltration_rate                         n                       



                _1.73_sq_M.pr                                                 



                edicted_among                                                 



                _non-blacks_V                                                 



                olume_Rate_Ar                                                 



                ea_in_Serum_P                                                 



                lasma_or_Bloo                                                 



                d_by_Creatini                                                 



                ne-based_form                                                 



                ula_MDRD_                                                 

 

         All     Hematocrit_V  unknown  32.1    unknown  %       _4544-3  unknow

n  unknown



                olume_Fractio                                                 



                n_of_Blood_by                                                 



                _Automated_co                                                 



                unt                                                     

 

         All     bilirubin_se  unknown  0.7     unknown  mg/dL   _43     unknown

  unknown



                rum_total                                                 

 

         All     alanine_amin  unknown  < 10 IU/L  unknown          _40     unkn

own  unknown



                otransferase_                                                 



                SGPT_serum                                                 

 

         All     carbon_dioxi  unknown  25      unknown  mmol/   _3962   unknown

  unknown



                de_serum_tota                         L                       



                l                                                       

 

         All     aspartate_am  unknown  15      unknown  U/L     _39     unknown

  unknown



                inotransferas                                                 



                e_SGOT_serum                                                 

 

         All     protein_tota  unknown  5.4     unknown  g/dL    _36     unknown

  unknown



                l_serum                                                 

 

         All     blood_glucos  unknown  113     unknown  mg/dL   _3565   unknown

  unknown



                e                                                       

 

         All     potassium_bl  unknown  4.1     unknown  meq/L   _3483   unknown

  unknown



                ood                                                     

 

         All     glucose_urin  unknown  NEGATIVE  unknown          _3369   unkno

wn  unknown



                e               mg/dL                                   

 

         All     leukocyte_es  unknown  MODERATE  unknown          _327    unkno

wn  unknown



                terase_urine_                                                 



                by_dipstick                                                 

 

         All     urobilinogen  unknown  0.2     unknown          _326    unknown

  unknown



                _urine_semiqu         (NORMAL)                                 



                antitative_di                                                 



                pstick_                                                 

 

         All     specific_gra  unknown  1.020   unknown          _325    unknown

  unknown



                vity_urine                                                 

 

         All     nitrite_urin  unknown  NEGATIVE  unknown          _323    unkno

wn  unknown



                e_semiquantit                                                 



                ative                                                   

 

         All     ketones_urin  unknown  NEGATIVE  unknown          _322    unkno

wn  unknown



                e_by_test_str                                                 



                ip                                                      

 

         All     magnesium_se  unknown  1.9     unknown  mg/dL   _32     unknown

  unknown



                rum                                                     

 

         All     bilirubin_ur  unknown  NEGATIVE  unknown          _319    unkno

wn  unknown



                ine                                                     

 

         All     mean_corpusc  unknown  96.4    unknown  fL      _315    unknown

  unknown



                ular_volume_R                                                 



                BC                                                      

 

         All     Urea_nitroge  unknown  17      unknown  mg/dL   _3094-0  unknow

n  unknown



                n_Mass_volume                                                 



                _in_Serum_or_                                                 



                Plasma                                                  

 

         All     globulin_ser  unknown  2.0     unknown          _3059   unknown

  unknown



                um                                                      

 

         All     alkaline_pho  unknown  87      unknown  U/L     _3      unknown

  unknown



                sphatase_seru                                                 



                m                                                       

 

         All     Sodium_Moles  unknown  138     unknown  mmol/   _2951-2  unknow

n  unknown



                _volume_in_Se                         L                       



                rum_or_Plasma                                                 

 

         All     Protein_Mass  unknown  5.4     unknown  g/dL    _2885-2  unknow

n  unknown



                _volume_in_Se                                                 



                rum_or_Plasma                                                 

 

         All     eosinophil_c  unknown  0.1 10  unknown          _285    unknown

  unknown



                ount_blood         3/UL                                    

 

         All     anion_gap_se  unknown  8.0     unknown          _279    unknown

  unknown



                rum                                                     

 

         All     mean_platele  unknown  8.6     unknown  fL      _2784   unknown

  unknown



                t_volume                                                 

 

         All     urine_color  unknown  DARK    unknown          _2751   unknown 

 unknown



                                YELLOW                                  

 

         All     Magnesium_Mo  unknown  1.9     unknown  mg/dL   _2601-3  unknow

n  unknown



                les_volume_in                                                 



                _Serum_or_Pla                                                 



                sma                                                     

 

         All     basophil_cou  unknown  0.0 10  unknown          _2427   unknown

  unknown



                nt_blood         3/UL                                    

 

         All     monocyte_cou  unknown  0.5 10  unknown          _2422   unknown

  unknown



                nt_blood         3/UL                                    

 

         All     lymphocyte_c  unknown  1.2 10  unknown          _2420   unknown

  unknown



                ount_blood         3/UL                                    

 

         All     neutrophil_c  unknown  3.7 10  unknown          _2418   unknown

  unknown



                ount_blood         3/UL                                    

 

         All     Glucose_Mass  unknown  NEGATIVE  unknown          _2350-7  unkn

own  unknown



                _volume_in_Ur         mg/dL                                   



                ine                                                     

 

         All     Glucose_Mass  unknown  113     unknown  mg/dL   _2345-7  unknow

n  unknown



                _volume_in_Se                                                 



                rum_or_Plasma                                                 

 

         All     Globulin_Mas  unknown  2.0     unknown          _2336-6  unknow

n  unknown



                s_volume_in_S                                                 



                vanessa                                                    

 

         All     Creatinine_M  unknown  0.8     unknown  mg/dL   _2160-0  unknow

n  unknown



                ass_volume_in                                                 



                _Serum_or_Pla                                                 



                sma                                                     

 

         All     Chloride_Mol  unknown  105     unknown  mmol/   _2075-0  unknow

n  unknown



                es_volume_in_                         L                       



                Serum_or_Plas                                                 



                ma                                                      

 

         All     carbon_dioxi  unknown  25      unknown  mmol/   _8-9  unknow

n  unknown



                de_serum_tota                         L                       



                l                                                       

 

         All     Calcium_Mole  unknown  8.0     unknown  mg/dL   _-8  unknow

n  unknown



                s_volume_in_S                                                 



                erum_or_Plasm                                                 



                a                                                       

 

         All     albumin_seru  unknown  3.4     unknown  g/dL    _2      unknown

  unknown



                m                                                       

 

         All     Bilirubin.to  unknown  0.7     unknown  mg/dL   _-2  unknow

n  unknown



                tal_Mass_volu                                                 



                me_in_Serum_o                                                 



                r_Plasma                                                 

 

         All     Aspartate_am  unknown  15      unknown  U/L     _1920-8  unknow

n  unknown



                inotransferas                                                 



                e_Enzymatic_a                                                 



                ctivity_volum                                                 



                e_in_Serum_or                                                 



                _Plasma                                                 

 

         All     Anion_gap_4_  unknown  8.0     unknown          _1863-0  unknow

n  unknown



                in_Serum_or_P                                                 



                lasma                                                   

 

         All     creatinine_s  unknown  0.8     unknown  mg/dL   _18     unknown

  unknown



                vanessa                                                    

 

         All     Alkaline_pho  unknown  87      unknown  U/L     _1783-0  unknow

n  unknown



                sphatase_Enzy                                                 



                matic_activit                                                 



                y_volume_in_B                                                 



                lood                                                    

 

         All     Albumin_Glob  unknown  1.7     unknown          _1759-0  unknow

n  unknown



                ulin_Mass_Rat                                                 



                io_in_Serum_o                                                 



                r_Plasma                                                 

 

         All     Albumin_Mass  unknown  3.4     unknown  g/dL    _1751-7  unknow

n  unknown



                _volume_in_Se                                                 



                rum_or_Plasma                                                 

 

         All     Alanine_amin  unknown  < 10 IU/L  unknown          _1742-6  unk

nown  unknown



                otransferase_                                                 



                Enzymatic_act                                                 



                ivity_volume_                                                 



                in_Serum_or_P                                                 



                lasma                                                   

 

         All     mean_corpusc  unknown  32.1    unknown  g/dL    _17029  unknown

  unknown



                ular_hemoglob                                                 



                in_concentrat                                                 



                ion_rbc                                                 

 

         All     sodium_serum  unknown  138     unknown  mmol/   _159    unknown

  unknown



                                                L                       

 

         All     albumin_glob  unknown  1.7     unknown          _146    unknown

  unknown



                ulin_ratio_se                                                 



                rum                                                     

 

         All     chloride_ser  unknown  105     unknown  mmol/   _13     unknown

  unknown



                um                              L                       

 

         All     calcium_seru  unknown  8.0     unknown  mg/dL   _11     unknown

  unknown



                m                                                       

 

         All     mean_corpusc  unknown  30.9    unknown  pg      _1031   unknown

  unknown



                ular_hemoglob                                                 



                in_RBC                                                  

 

         All     red_blood_ce  unknown  15.0    unknown  %       _1030   unknown

  unknown



                ll_distributi                                                 



                on_width                                                 

 

         All     WBC_urine_on  unknown  11-25   unknown          _1016   unknown

  unknown



                _microscopy         /HPF                                    

 

         All     T       unknown  5.6 X10  unknown          WBC     unknown  unk

nown



                                3/UL                                    

 

         All     WBC_URINE  unknown  11-25   unknown          UWBC    unknown  u

nknown



                                /HPF                                    

 

         All     UROBILINOGEN  unknown  0.2     unknown          UUROBIL  unknow

n  unknown



                _URINE          (NORMAL)                                 

 

         All     SPECIFIC_GRA  unknown  1.020   unknown          USG     unknown

  unknown



                VITY_URINE                                                 

 

         All     T       unknown  11-25   unknown          UR_WBC  unknown  unkn

own



                                /HPF                                    

 

         All     T       unknown  0.2     unknown          UR_URO  unknown  unkn

own



                                (NORMAL)                                 

 

         All     T       unknown  1.020   unknown          UR_SG   unknown  unkn

own

 

         All     T       unknown  6.0     unknown          UR_PH   unknown  unkn

own

 

         All     T       unknown  NEGATIVE  unknown          UR_NIT  unknown  un

known

 

         All     T       unknown  MODERATE  unknown          UR_LEU_E  unknown  

unknown



                                                        STERASE         

 

         All     T       unknown  NEGATIVE  unknown          UR_KETO_  unknown  

unknown



                                                        UA_             

 

         All     T       unknown  NEGATIVE  unknown          UR_GLU  unknown  un

known



                                mg/dL                                   

 

         All     T       unknown  DARK    unknown          UR_COLOR  unknown  un

known



                                YELLOW                                  

 

         All     T       unknown  HAZY    unknown          UR_CLARI  unknown  un

known



                                                        TY              

 

         All     T       unknown  NEGATIVE  unknown          UR_BILI  unknown  u

nknown

 

         All     PH_URINE  unknown  6.0     unknown          UPH     unknown  un

known

 

         All     NITRITE_URIN  unknown  NEGATIVE  unknown          UNITRITE  unk

nown  unknown



                E                                                       

 

         All     LEUKOCYTE_ES  unknown  MODERATE  unknown          ULEUK   unkno

wn  unknown



                TERASE_URINE                                                 

 

         All     KETONES_URIN  unknown  NEGATIVE  unknown          UKET    unkno

wn  unknown



                E_UA_                                                   

 

         All     GLUCOSE_URIN  unknown  NEGATIVE  unknown          UGLUC   unkno

wn  unknown



                E_UA_           mg/dL                                   

 

         All     COLOR_URINE  unknown  DARK    unknown          UCOL    unknown 

 unknown



                                YELLOW                                  

 

         All     CLARITY_URIN  unknown  HAZY    unknown          UCLAR   unknown

  unknown



                E                                                       

 

         All     BILIRUBIN_UR  unknown  NEGATIVE  unknown          UBIL    unkno

wn  unknown



                INE                                                     

 

         All     T       unknown  0.7     unknown  mg/dL   TOTAL_BI  unknown  un

known



                                                        LI              

 

         All     T       unknown  15.0    unknown  %       RDW     unknown  unkn

own

 

         All     T       unknown  3.33 10  unknown          RBC     unknown  unk

nown



                                6/UL                                    

 

         All     T       unknown  5.4     unknown  g/dL    PRO_TOTA  unknown  un

known



                                                        L               

 

         All     T       unknown  163 10  unknown          PLT     unknown  unkn

own



                                3/UL                                    

 

         All     NEUTROPHILS_  unknown  3.7 10  unknown          NE_     unknown

  unknown



                AUTO_           3/UL                                    

 

         All     T       unknown  3.7 10  unknown          NEUT_AUT  unknown  un

known



                                3/UL                    O_              

 

         All     T       unknown  138     unknown  mmol/   NA      unknown  unkn

own



                                                L                       

 

         All     T       unknown  8.6     unknown  fL      MPV     unknown  unkn

own

 

         All     MONOCYTES_AU  unknown  0.5 10  unknown          MO_     unknown

  unknown



                TO_             3/UL                                    

 

         All     T       unknown  0.5 10  unknown          MONO_AUT  unknown  un

known



                                3/UL                    O_              

 

         All     T       unknown  1.9     unknown  mg/dL   MG      unknown  unkn

own

 

         All     T       unknown  96.4    unknown  fL      MCV     unknown  unkn

own

 

         All     T       unknown  32.1    unknown  g/dL    MCHC    unknown  unkn

own

 

         All     T       unknown  30.9    unknown  pg      MCH     unknown  unkn

own

 

         All     LYMPHOCYTES_  unknown  1.2 10  unknown          LY_     unknown

  unknown



                AUTO_           3/UL                                    

 

         All     T       unknown  1.2 10  unknown          LYMPH_AU  unknown  un

known



                                3/UL                    TO_             

 

         All     T       unknown  4.1     unknown  meq/L   K       unknown  unkn

own

 

         All     T       unknown  10.3    unknown  g/dL    HGB     unknown  unkn

own

 

         All     T       unknown  32.1    unknown  %       HCT     unknown  unkn

own

 

         All     T       unknown  113     unknown  mg/dL   GLU     unknown  unkn

own

 

         All     T       unknown  2.0     unknown          GLOB    unknown  unkn

own

 

         All     T       unknown  70      unknown  mL/mi   GFR_-_MD  unknown  un

known



                                                n       RD              

 

         All     GFR_-_MDRD  unknown  70      unknown  mL/mi   GFR     unknown  

unknown



                                                n                       

 

         All     T       unknown  8.0     unknown          GAP     unknown  unkn

own

 

         All     EOSINOPHILS_  unknown  0.1 10  unknown          EO_     unknown

  unknown



                AUTO_           3/UL                                    

 

         All     T       unknown  0.1 10  unknown          EOS_AUTO  unknown  un

known



                                3/UL                    _               

 

         All     T       unknown  0.8     unknown  mg/dL   CREAT   unknown  unkn

own

 

         All     T       unknown  25      unknown  mmol/   CO2     unknown  unkn

own



                                                L                       

 

         All     T       unknown  105     unknown  mmol/   CL      unknown  unkn

own



                                                L                       

 

         All     T       unknown  8.0     unknown  mg/dL   CA      unknown  unkn

own

 

         All     T       unknown  17      unknown  mg/dL   BUN     unknown  unkn

own

 

         All     BILIRUBIN_TO  unknown  0.7     unknown  mg/dL   BILIT   unknown

  unknown



                CONNIE                                                     

 

         All     BASOPHILS_AU  unknown  0.0 10  unknown          BA_     unknown

  unknown



                TO_             3/UL                                    

 

         All     T       unknown  0.0 10  unknown          BASO_AUT  unknown  un

known



                                3/UL                    O_              

 

         All     T       unknown  1.7     unknown          A_G_RATI  unknown  un

known



                                                        O               

 

         All     T       unknown  15      unknown  U/L     AST     unknown  unkn

own

 

         All     T       unknown  < 10 IU/L  unknown          ALT_SGPT  unknown 

 unknown



                                                        _               

 

         All     ALT_ALANINE_  unknown  < 10 IU/L  unknown          ALT     unkn

own  unknown



                AMINOTRANSFER                                                 



                ASE                                                     

 

         All     ALKALINE_PHO  unknown  87      unknown  U/L     ALP     unknown

  unknown



                SPHATASE                                                 

 

         All     T       unknown  87      unknown  U/L     ALK_PHOS  unknown  un

known

 

         All     T       unknown  3.4     unknown  g/dL    ALB     unknown  unkn

own

 

         All     ALBUMIN_GLOB  unknown  1.7     unknown          AGRATIO  unknow

n  unknown



                ULIN_RATIO                                                 







Vital Signs







                 date            measurement     value           source

 

                 2022        weight_standard  120.4           lb

 

                 2022        weight_metric   54.61           kg

 

                 2022        temperature_standard  97.4            F

 

                 2022        temperature_metric  36.33           C

 

                 2022        respiration_rate  15              /min

 

                 2022        height_standard  63.07           in

 

                 2022        height_metric   160.2           cm

 

                 2022        heart_rate      73              /min

 

                 2022        BP_systolic     151             mm[Hg]

 

                 2022        BP_diastolic    67              mm[Hg]

 

                 2022        BMI             21.36           kg/m2

 

                 2022        weight_standard  125             lb

 

                 2022        weight_metric   56.7            kg

 

                 2022        temperature_standard  97.5            F

 

                 2022        temperature_metric  36.39           C

 

                 2022        respiration_rate  14              /min

 

                 2022        height_standard  63.07           in

 

                 2022        height_metric   160.2           cm

 

                 2022        heart_rate      75              /min

 

                 2022        BP_systolic     109             mm[Hg]

 

                 2022        BP_diastolic    55              mm[Hg]

 

                 2022        BMI             22.17           kg/m2

## 2022-07-16 ENCOUNTER — HOSPITAL ENCOUNTER (OUTPATIENT)
Dept: HOSPITAL 76 - EMS | Age: 77
Discharge: ANOTHER HOSPITAL | End: 2022-07-16
Payer: MEDICARE
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
## 2022-07-16 DIAGNOSIS — S01.01XA: Primary | ICD-10-CM
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
## 2022-08-28 ENCOUNTER — HOSPITAL ENCOUNTER (OUTPATIENT)
Dept: HOSPITAL 76 - EMS | Age: 77
Discharge: ANOTHER HOSPITAL | End: 2022-08-28
Payer: MEDICARE

## 2022-08-28 ENCOUNTER — HOSPITAL ENCOUNTER (OUTPATIENT)
Dept: HOSPITAL 76 - EMS | Age: 77
Discharge: HOME | End: 2022-08-28
Attending: STUDENT IN AN ORGANIZED HEALTH CARE EDUCATION/TRAINING PROGRAM
Payer: MEDICARE

## 2022-08-28 DIAGNOSIS — R41.0: Primary | ICD-10-CM

## 2022-08-28 DIAGNOSIS — R56.9: Primary | ICD-10-CM

## 2022-09-02 ENCOUNTER — HOSPITAL ENCOUNTER (OUTPATIENT)
Dept: HOSPITAL 76 - LAB.R | Age: 77
Discharge: HOME | End: 2022-09-02
Attending: HOSPITALIST
Payer: MEDICARE

## 2022-09-02 DIAGNOSIS — E78.5: ICD-10-CM

## 2022-09-02 DIAGNOSIS — G25.81: ICD-10-CM

## 2022-09-02 DIAGNOSIS — I10: Primary | ICD-10-CM

## 2022-09-02 DIAGNOSIS — G20: ICD-10-CM

## 2022-09-02 DIAGNOSIS — E53.8: ICD-10-CM

## 2022-09-02 LAB
RBC MAR: 3.57 10^6/UL (ref 4.2–5.4)
RETICS # AUTO: 0.06 10^6/UL (ref 0.02–0.11)
RETICS.HYPOCHROMIC/RBC NFR AUTO: 1.77 % (ref 0.5–2.3)

## 2022-09-02 PROCEDURE — 83540 ASSAY OF IRON: CPT

## 2022-09-02 PROCEDURE — 82728 ASSAY OF FERRITIN: CPT

## 2022-09-02 PROCEDURE — 84466 ASSAY OF TRANSFERRIN: CPT

## 2022-09-02 PROCEDURE — 85045 AUTOMATED RETICULOCYTE COUNT: CPT

## 2022-10-26 ENCOUNTER — HOSPITAL ENCOUNTER (EMERGENCY)
Dept: HOSPITAL 76 - ED | Age: 77
Discharge: HOME | End: 2022-10-26
Payer: MEDICARE

## 2022-10-26 ENCOUNTER — HOSPITAL ENCOUNTER (OUTPATIENT)
Dept: HOSPITAL 76 - EMS | Age: 77
Discharge: HOME | End: 2022-10-26
Attending: EMERGENCY MEDICINE
Payer: MEDICARE

## 2022-10-26 ENCOUNTER — HOSPITAL ENCOUNTER (OUTPATIENT)
Dept: HOSPITAL 76 - EMS | Age: 77
Discharge: ANOTHER HOSPITAL | End: 2022-10-26
Payer: MEDICARE

## 2022-10-26 VITALS — SYSTOLIC BLOOD PRESSURE: 154 MMHG | DIASTOLIC BLOOD PRESSURE: 75 MMHG

## 2022-10-26 DIAGNOSIS — R45.1: ICD-10-CM

## 2022-10-26 DIAGNOSIS — R41.0: Primary | ICD-10-CM

## 2022-10-26 DIAGNOSIS — I10: ICD-10-CM

## 2022-10-26 DIAGNOSIS — S01.81XA: ICD-10-CM
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
## 2022-10-26 DIAGNOSIS — F03.90: ICD-10-CM

## 2022-10-26 DIAGNOSIS — S09.90XA: ICD-10-CM
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
## 2022-10-26 DIAGNOSIS — S02.42XA: Primary | ICD-10-CM

## 2022-10-26 DIAGNOSIS — G20: ICD-10-CM

## 2022-10-26 DIAGNOSIS — S01.81XA: Primary | ICD-10-CM

## 2022-10-26 DIAGNOSIS — F02.80: ICD-10-CM
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
## 2022-10-26 PROCEDURE — 99282 EMERGENCY DEPT VISIT SF MDM: CPT

## 2022-10-26 PROCEDURE — 12013 RPR F/E/E/N/L/M 2.6-5.0 CM: CPT

## 2022-10-26 PROCEDURE — 99284 EMERGENCY DEPT VISIT MOD MDM: CPT

## 2022-10-26 NOTE — CT REPORT
PROCEDURE:  HEAD WO

 

INDICATIONS:  head injury/forehead lac

 

TECHNIQUE:  

Noncontrast 4.5 mm thick angled axial sections acquired from the foramen magnum to the vertex.  For r
adiation dose reduction, the following was used:  automated exposure control, adjustment of mA and/or
 kV according to patient size.

 

COMPARISON:  None.

 

FINDINGS:  

Image quality:  Excellent.  

 

CSF spaces:  Basal cisterns are patent.  No extra-axial fluid collections.  Ventricles are normal in 
size and shape.  

 

Brain:  No midline shift.  No intracranial masses or hemorrhage.  Gray-white matter interface is norm
al.  Age-related volume loss and small vessel ischemic change. Dense bilateral cavernous carotid athe
rosclerotic calcifications.

 

Skull and face:  Calvarium and visualized facial bones are intact, without suspicious lesions.  

 

Sinuses:  Visualized sinuses and mastoids are clear.  

 

IMPRESSION:  No evidence acute intracranial process.

 

Reviewed by: Errol Robertson MD on 10/26/2022 9:46 AM PDT

Approved by: Errol Robertson MD on 10/26/2022 9:46 AM PDT

 

 

Station ID:  [id number]

## 2022-10-26 NOTE — CT REPORT
PROCEDURE:  MAXILLOFACIAL WO

 

INDICATIONS:  fall against sink/loose front tooth

 

TECHNIQUE:  

Noncontrast 1.5 mm thick axial images acquired from the mandible through the frontal sinuses, with co
maylin and sagittal reformatting.  For radiation dose reduction, the following was used:  automated ex
posure control, adjustment of mA and/or kV according to patient size.

 

COMPARISON:  None.

 

FINDINGS:  

Image quality:  Excellent.  

 

Bones and teeth:  Orbital walls are intact.  Sinus walls show no fracture or deformity.  Nasal bones 
and septum are intact. Rightward nasal septal deviation. Very subtle fracture line involving the maxi
llary alveolar ridge, in proximity to the 2 front teeth immediately to the left of midline (teeth 9 a
nd 10). Left para midline frontal tooth implant with periapical lucency. Visualized portions of the m
andible demonstrate no fractures or subluxation.  Zygomatic arches are intact.  Pterygoid plates are 
intact.  Visualized portions of the skull base and auditory canals are intact.  

 

Sinuses:  Paranasal sinuses are aerated, without fluid levels, mucosal thickening, or mucoceles.  Mas
toid air cells are aerated.  

 

Soft tissues:  No edema, masses, or fluid collections.  No enlarged lymph nodes.  No soft tissue lace
rations or debris.  

 

Vascular:  Visualized vascular structures appear normal in the absence of contrast.  Bony vascular fo
ramina and canals are intact.  

 

IMPRESSION:  

 

1. There is a very subtle nondisplaced fracture involving the maxillary alveolar ridge anteriorly in 
close proximity to the T4 and T5 immediately to the left of midline. There is a left para midline fro
ntal to transplant with periapical lucency (tooth 9).

 

Reviewed by: Errol Robertson MD on 10/26/2022 9:54 AM PDT

Approved by: Errol Robertson MD on 10/26/2022 9:54 AM PDT

 

 

Station ID:  [id number]

## 2022-10-26 NOTE — CT REPORT
PROCEDURE:  CERVICAL SPINE WO

 

INDICATIONS:  fall/dementia/head injury

 

TECHNIQUE:  

Noncontrast 3 mm thick sections acquired from the skull base to the T4 level.  Sagittal and coronal r
eformats were then constructed.  For radiation dose reduction, the following was used:  automated exp
osure control, adjustment of mA and/or kV according to patient size.

 

COMPARISON:  7/16/2022

 

FINDINGS:  

Image quality:  Excellent.  

 

Bones:  No fractures or dislocations.  Visualized superior ribs are intact. There is cervical spondyl
itic change with multilevel facet arthropathy. Unchanged findings. Disc bulges at C3-C4 and C4-C5. Un
changed alignment. Trace anterolisthesis of C4 on C5.

 

Soft tissues:  Prevertebral soft tissues are normal in thickness.  No paravertebral hematomas.  No ap
ical pneumothoraces.  Dense bilateral carotid calcifications.

 

IMPRESSION:  

 

1. No evidence acute cervical fracture or dislocation.

 

2. Cervical spondylitic change.

 

3. Disc bulges at C3-C4 and C4-C5, as before.

 

4. Dense bilateral carotid calcifications.

 

Reviewed by: Errol Robertson MD on 10/26/2022 9:41 AM PDT

Approved by: Errol Robertson MD on 10/26/2022 9:41 AM PDT

 

 

Station ID:  [id number]

## 2022-10-26 NOTE — ED PHYSICIAN DOCUMENTATION
PD HPI HEAD INJURY





- Stated complaint


Stated Complaint: GLF





- Chief complaint


Chief Complaint: Laceration





- History obtained from


History obtained from: EMS





- History of Present Illness


Mechanism of head injury: Fell





- Additional information


Additional information: 


Patient is a 76-year-old female presenting for evaluation of a witnessed ground-

level fall.  She is a resident at [facility].  She tripped and lost her 

balance with staff nearby.  She did hit her head on a sink and they helped lower

her to the floor.There is no LOC and she is not on blood thinners.  Her tetanus 

is up-to-date. Patient is not able to provide any meaningful history.  Per EMS, 

she appears to be at her baseline.








Review of Systems


Unable to obtain: Dementia





PD PAST MEDICAL HISTORY





- Past Medical History


Past Medical History: Yes


Cardiovascular: Hypertension, High cholesterol


Respiratory: None


Neuro: Dementia, Parkinson's


Endocrine/Autoimmune: None


GI: Ulcers


GYN: None


: None


HEENT: Chronic vision loss


Psych: Anxiety


Musculoskeletal: Osteoarthritis, Chronic back pain


Derm: None





- Past Surgical History


Past Surgical History: Yes


General: Colonoscopy


Ortho: Hip replacement, Rotator cuff repair





- Present Medications


Home Medications: 


                                Ambulatory Orders











 Medication  Instructions  Recorded  Confirmed


 


Carbidopa/Levodopa 25/100 [Sinemet 2 tab PO 2300 03/18/15 10/26/22





25 mg/100 mg]   


 


Citalopram [CeleXA] 10 mg PO QPM 03/18/15 10/26/22


 


Carbidopa/Levodopa 25/100 [Sinemet 2 tab PO 0900,1300,1700,2100 04/23/19 

10/26/22





25 mg/100 mg]   


 


Aspirin [[place] Aspirin] 81 mg PO DAILY 09/22/21 10/26/22


 


Atorvastatin [Lipitor] 20 mg PO DAILY PM 09/22/21 10/26/22


 


Metoprolol Succinate [Toprol Xl] 25 mg PO DAILY 09/22/21 10/26/22


 


Quetiapine Fumarate [Seroquel] 50 mg PO BID 03/31/22 10/26/22


 


Rivastigmine Tartrate 1.5 mg PO BID 03/31/22 10/26/22





[Rivastigmine]   


 


Bisacodyl Supp [Dulcolax Supp] 10 mg FL DAILY PRN 10/26/22 10/26/22


 


Docusate Sodium [Stool Softener] 250 mg PO BID 10/26/22 10/26/22














- Allergies


Allergies/Adverse Reactions: 


                                    Allergies











Allergy/AdvReac Type Severity Reaction Status Date / Time


 


No Known Drug Allergies Allergy   Verified 10/26/22 08:19














- Social History


Does the pt smoke?: No


Smoking Status: Never smoker


Does the pt drink ETOH?: No


Does the pt have substance abuse?: Yes





- Immunizations


Immunizations are current?: Yes





- POLST


Patient has POLST: No





PD ED PE NORMAL





- General


General: No acute distress, Other (Elderly, frail-appearing)





- HEENT


HEENT: PERRL, EOMI.  No: Atraumatic (Large midline forehead laceration), 

Dentition benign (Small amount of blood At gumline of tooth 9 with no laxity)





- Neck


Neck: Supple, no meningeal sign, No bony TTP





- Cardiac


Cardiac: RRR, Strong equal pulses





- Respiratory


Respiratory: No respiratory distress, Clear bilaterally





- Abdomen


Abdomen: Soft, Non tender, Non distended





- Derm


Derm: Warm and dry





- Extremities


Extremities: No deformity





- Neuro


Neuro: No motor deficit (Moves all extremities).  No: Alert and oriented X 3





PD ED PE EXPANDED





- HEENT


HEENT Visual: 


                            __________________________














                            __________________________





 1 - laceration





 2 - deformity (Small amount of blood)








Results





- Vitals


Vitals: 


                               Vital Signs - 24 hr











  10/26/22 10/26/22 10/26/22





  08:19 08:25 11:50


 


Temperature 37.3 C  


 


Heart Rate 70 66 71


 


Respiratory 16 18 16





Rate   


 


Blood Pressure 147/71 H 150/80 H 154/75 H


 


O2 Saturation 96 99 99








                                     Oxygen











O2 Source [With Activity]      Room air


 


O2 Source [Without Activity]   Room air


 


O2 Source                      Room air

















Procedures





- Laceration (location)


  ** Mid forehead


Length in cm: 4


Wound type: Linear, Clean


Anesthesia: LET


Wound preparation: Hibiclens, Irrigated copiously NS


Skin layer closure: Size #-0 - enter number (4), Sutures - enter # (8)


Other: Patient tolerated well, No complications, Neurovascular intact, Tetanus 

UTD





PD MEDICAL DECISION MAKING





- ED course


Complexity details: re-evaluated patient


ED course: 


Patient with witnessed fall into sink with head injury.  Per reports she is at 

her neurologic baseline. CT head and cervical spine without acute findings.  CT 

maxillofacial obtained with nondisplaced alveolar ridge fracture.  Per Dr. Zazueta, pt can follow up as outpatient. Patient had a large laceration to 

forehead that was cleaned and sutured which she tolerated well. Vital signs 

remained stable.








Departure





- Departure


Disposition: 01 Home, Self Care


Clinical Impression: 


Head injury


Qualifiers:


 Encounter type: initial encounter Qualified Code(s): S09.90XA - Unspecified 

injury of head, initial encounter





Forehead laceration


Qualifiers:


 Encounter type: initial encounter Qualified Code(s): S01.81XA - Laceration 

without foreign body of other part of head, initial encounter





Closed fracture of alveolar ridge of maxilla


Qualifiers:


 Encounter type: initial encounter Qualified Code(s): S02.42XA - Fracture of 

alveolus of maxilla, initial encounter for closed fracture





Condition: Stable


Instructions:  Trauma Dental, ED Head Injury Closed, ED Laceration Facial Sutr 

Tape


Follow-Up: 


Jasen Zazueta DDS [Provider Admit Priv/Credential] - 


Comments: 


You were seen after falling and hitting your head.  8 stitches were placed to 

your forehead wound.  These should be kept in place for 1 week and removed On 

November 2.  At there was no injury In your brain or your cervical spine. You do

have a fracture involving The upper dental bone. The fracture is not displaced 

but I would recommend following up with your dentist or oral surgeon This week. 

I would recommend a liquid diet in the meanwhile. I have included the name of an

oral surgeon(Dr. Zazueta) that you can also try to call to get a follow-up 

appointment.





1. There is a very subtle nondisplaced fracture involving the maxillary alveolar

ridge anteriorly in


close proximity to the T4 and T5 immediately to the left of midline. There is a 

left para midline 


frontal to transplant with periapical lucency (tooth 9). 


Discharge Date/Time: 10/26/22 12:37

## 2022-12-16 ENCOUNTER — HOSPITAL ENCOUNTER (OUTPATIENT)
Dept: HOSPITAL 76 - LAB.R | Age: 77
Discharge: HOME | End: 2022-12-16
Attending: INTERNAL MEDICINE
Payer: MEDICARE

## 2022-12-16 DIAGNOSIS — R05.1: Primary | ICD-10-CM

## 2022-12-16 DIAGNOSIS — Z20.822: ICD-10-CM

## 2022-12-16 LAB
B PARAPERT DNA SPEC QL NAA+PROBE: NOT DETECTED
B PERT DNA SPEC QL NAA+PROBE: NOT DETECTED
C PNEUM DNA NPH QL NAA+NON-PROBE: NOT DETECTED
FLUAV RNA RESP QL NAA+PROBE: NOT DETECTED
HAEM INFLU B DNA SPEC QL NAA+PROBE: NOT DETECTED
HCOV 229E RNA SPEC QL NAA+PROBE: NOT DETECTED
HCOV HKU1 RNA UPPER RESP QL NAA+PROBE: NOT DETECTED
HCOV NL63 RNA ASPIRATE QL NAA+PROBE: NOT DETECTED
HCOV OC43 RNA SPEC QL NAA+PROBE: NOT DETECTED
HMPV AG SPEC QL: NOT DETECTED
HPIV1 RNA NPH QL NAA+PROBE: NOT DETECTED
HPIV2 SPEC QL CULT: NOT DETECTED
HPIV3 AB TITR SER CF: NOT DETECTED {TITER}
HPIV4 RNA SPEC QL NAA+PROBE: NOT DETECTED
M PNEUMO DNA SPEC QL NAA+PROBE: NOT DETECTED
RSV RNA RESP QL NAA+PROBE: DETECTED
RV+EV RNA SPEC QL NAA+PROBE: NOT DETECTED
SARS-COV-2 RNA PNL SPEC NAA+PROBE: NOT DETECTED

## 2022-12-16 PROCEDURE — 87633 RESP VIRUS 12-25 TARGETS: CPT

## 2023-01-03 ENCOUNTER — HOSPITAL ENCOUNTER (OUTPATIENT)
Dept: HOSPITAL 76 - EMS | Age: 78
End: 2023-01-03
Payer: MEDICARE
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
## 2023-01-03 DIAGNOSIS — R56.9: Primary | ICD-10-CM

## 2023-01-03 DIAGNOSIS — S09.90XA: ICD-10-CM
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
## 2023-08-16 ENCOUNTER — HOSPITAL ENCOUNTER (OUTPATIENT)
Dept: HOSPITAL 76 - EMS | Age: 78
Discharge: ANOTHER HOSPITAL | End: 2023-08-16
Payer: MEDICARE

## 2023-08-16 ENCOUNTER — HOSPITAL ENCOUNTER (INPATIENT)
Dept: HOSPITAL 76 - ED | Age: 78
LOS: 7 days | Discharge: HOME | DRG: 308 | End: 2023-08-23
Attending: SPECIALIST | Admitting: INTERNAL MEDICINE
Payer: MEDICARE

## 2023-08-16 DIAGNOSIS — M19.90: ICD-10-CM

## 2023-08-16 DIAGNOSIS — I50.33: ICD-10-CM

## 2023-08-16 DIAGNOSIS — Z96.649: ICD-10-CM

## 2023-08-16 DIAGNOSIS — I48.92: Primary | ICD-10-CM

## 2023-08-16 DIAGNOSIS — H54.7: ICD-10-CM

## 2023-08-16 DIAGNOSIS — Z79.899: ICD-10-CM

## 2023-08-16 DIAGNOSIS — I10: ICD-10-CM

## 2023-08-16 DIAGNOSIS — N39.0: ICD-10-CM

## 2023-08-16 DIAGNOSIS — D64.9: ICD-10-CM

## 2023-08-16 DIAGNOSIS — G93.40: ICD-10-CM

## 2023-08-16 DIAGNOSIS — I47.20: ICD-10-CM

## 2023-08-16 DIAGNOSIS — R07.9: ICD-10-CM

## 2023-08-16 DIAGNOSIS — R09.02: ICD-10-CM

## 2023-08-16 DIAGNOSIS — Z66: ICD-10-CM

## 2023-08-16 DIAGNOSIS — J81.0: ICD-10-CM
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
## 2023-08-16 DIAGNOSIS — G89.29: ICD-10-CM

## 2023-08-16 DIAGNOSIS — M54.9: ICD-10-CM

## 2023-08-16 DIAGNOSIS — E86.0: ICD-10-CM

## 2023-08-16 DIAGNOSIS — G20: ICD-10-CM

## 2023-08-16 DIAGNOSIS — E78.00: ICD-10-CM

## 2023-08-16 DIAGNOSIS — C18.9: ICD-10-CM

## 2023-08-16 DIAGNOSIS — Z79.82: ICD-10-CM

## 2023-08-16 DIAGNOSIS — I11.0: ICD-10-CM

## 2023-08-16 DIAGNOSIS — I48.91: ICD-10-CM

## 2023-08-16 DIAGNOSIS — J18.9: ICD-10-CM

## 2023-08-16 DIAGNOSIS — R00.0: Primary | ICD-10-CM

## 2023-08-16 DIAGNOSIS — B96.1: ICD-10-CM

## 2023-08-16 DIAGNOSIS — F02.80: ICD-10-CM

## 2023-08-16 DIAGNOSIS — D72.829: ICD-10-CM

## 2023-08-16 LAB
ALBUMIN DIAFP-MCNC: 3.1 G/DL (ref 3.2–5.5)
ALBUMIN/GLOB SERPL: 0.9 {RATIO} (ref 1–2.2)
ALP SERPL-CCNC: 70 IU/L (ref 42–121)
ALT SERPL W P-5'-P-CCNC: 50 IU/L (ref 10–60)
ANION GAP SERPL CALCULATED.4IONS-SCNC: 10 MMOL/L (ref 6–13)
AST SERPL W P-5'-P-CCNC: 130 IU/L (ref 10–42)
BASOPHILS NFR BLD AUTO: 0 10^3/UL (ref 0–0.1)
BASOPHILS NFR BLD AUTO: 0.3 %
BILIRUB BLD-MCNC: 0.6 MG/DL (ref 0.2–1)
BUN SERPL-MCNC: 37 MG/DL (ref 6–20)
CALCIUM UR-MCNC: 8.2 MG/DL (ref 8.5–10.3)
CARDIAC TROPONIN I PNL SERPL HS: 491 NG/L (ref 2.3–14.8)
CHLORIDE SERPL-SCNC: 103 MMOL/L (ref 101–111)
CLARITY UR REFRACT.AUTO: (no result)
CO2 SERPL-SCNC: 24 MMOL/L (ref 21–32)
CREAT SERPLBLD-SCNC: 1.1 MG/DL (ref 0.4–1)
EOSINOPHIL # BLD AUTO: 0 10^3/UL (ref 0–0.7)
EOSINOPHIL NFR BLD AUTO: 0.2 %
ERYTHROCYTE [DISTWIDTH] IN BLOOD BY AUTOMATED COUNT: 13.8 % (ref 12–15)
GFRSERPLBLD MDRD-ARVRAT: 48 ML/MIN/{1.73_M2} (ref 89–?)
GLOBULIN SER-MCNC: 3.6 G/DL (ref 2.1–4.2)
GLUCOSE SERPL-MCNC: 165 MG/DL (ref 70–100)
GLUCOSE UR QL STRIP.AUTO: NEGATIVE MG/DL
HCT VFR BLD AUTO: 31.4 % (ref 37–47)
HGB UR QL STRIP: 10 G/DL (ref 12–16)
KETONES UR QL STRIP.AUTO: NEGATIVE MG/DL
LIPASE SERPL-CCNC: 24 U/L (ref 22–51)
LYMPHOCYTES # SPEC AUTO: 1 10^3/UL (ref 1.5–3.5)
LYMPHOCYTES NFR BLD AUTO: 8.5 %
MCH RBC QN AUTO: 31 PG (ref 27–31)
MCHC RBC AUTO-ENTMCNC: 31.8 G/DL (ref 32–36)
MCV RBC AUTO: 97.2 FL (ref 81–99)
MONOCYTES # BLD AUTO: 0.6 10^3/UL (ref 0–1)
MONOCYTES NFR BLD AUTO: 5 %
NEUTROPHILS # BLD AUTO: 9.7 10^3/UL (ref 1.5–6.6)
NEUTROPHILS # SNV AUTO: 11.4 X10^3/UL (ref 4.8–10.8)
NEUTROPHILS NFR BLD AUTO: 85.2 %
NITRITE UR QL STRIP.AUTO: POSITIVE
NRBC # BLD AUTO: 0 /100WBC
NRBC # BLD AUTO: 0 X10^3/UL
PDW BLD AUTO: 9.7 FL (ref 7.9–10.8)
PH UR STRIP.AUTO: 5.5 PH (ref 5–7.5)
PLATELET # BLD: 239 10^3/UL (ref 130–450)
POTASSIUM SERPL-SCNC: 3.7 MMOL/L (ref 3.5–5)
PROT SPEC-MCNC: 6.7 G/DL (ref 6.7–8.2)
PROT UR STRIP.AUTO-MCNC: (no result) MG/DL
RBC # UR STRIP.AUTO: NEGATIVE /UL
RBC # URNS HPF: (no result) /HPF (ref 0–5)
RBC MAR: 3.23 10^6/UL (ref 4.2–5.4)
SODIUM SERPLBLD-SCNC: 137 MMOL/L (ref 135–145)
SP GR UR STRIP.AUTO: 1.02 (ref 1–1.03)
SQUAMOUS URNS QL MICRO: (no result)
UROBILINOGEN UR QL STRIP.AUTO: (no result) E.U./DL
UROBILINOGEN UR STRIP.AUTO-MCNC: NEGATIVE MG/DL
WBC # UR MANUAL: (no result) /HPF (ref 0–5)

## 2023-08-16 PROCEDURE — 85025 COMPLETE CBC W/AUTO DIFF WBC: CPT

## 2023-08-16 PROCEDURE — 83690 ASSAY OF LIPASE: CPT

## 2023-08-16 PROCEDURE — 82746 ASSAY OF FOLIC ACID SERUM: CPT

## 2023-08-16 PROCEDURE — 84484 ASSAY OF TROPONIN QUANT: CPT

## 2023-08-16 PROCEDURE — 81001 URINALYSIS AUTO W/SCOPE: CPT

## 2023-08-16 PROCEDURE — 80053 COMPREHEN METABOLIC PANEL: CPT

## 2023-08-16 PROCEDURE — 83735 ASSAY OF MAGNESIUM: CPT

## 2023-08-16 PROCEDURE — 70551 MRI BRAIN STEM W/O DYE: CPT

## 2023-08-16 PROCEDURE — 82330 ASSAY OF CALCIUM: CPT

## 2023-08-16 PROCEDURE — 70450 CT HEAD/BRAIN W/O DYE: CPT

## 2023-08-16 PROCEDURE — 96376 TX/PRO/DX INJ SAME DRUG ADON: CPT

## 2023-08-16 PROCEDURE — 99285 EMERGENCY DEPT VISIT HI MDM: CPT

## 2023-08-16 PROCEDURE — 87150 DNA/RNA AMPLIFIED PROBE: CPT

## 2023-08-16 PROCEDURE — 87181 SC STD AGAR DILUTION PER AGT: CPT

## 2023-08-16 PROCEDURE — 81003 URINALYSIS AUTO W/O SCOPE: CPT

## 2023-08-16 PROCEDURE — 80048 BASIC METABOLIC PNL TOTAL CA: CPT

## 2023-08-16 PROCEDURE — 97166 OT EVAL MOD COMPLEX 45 MIN: CPT

## 2023-08-16 PROCEDURE — 83880 ASSAY OF NATRIURETIC PEPTIDE: CPT

## 2023-08-16 PROCEDURE — 87077 CULTURE AEROBIC IDENTIFY: CPT

## 2023-08-16 PROCEDURE — 82607 VITAMIN B-12: CPT

## 2023-08-16 PROCEDURE — 84100 ASSAY OF PHOSPHORUS: CPT

## 2023-08-16 PROCEDURE — 96365 THER/PROPH/DIAG IV INF INIT: CPT

## 2023-08-16 PROCEDURE — 84443 ASSAY THYROID STIM HORMONE: CPT

## 2023-08-16 PROCEDURE — 93306 TTE W/DOPPLER COMPLETE: CPT

## 2023-08-16 PROCEDURE — 36415 COLL VENOUS BLD VENIPUNCTURE: CPT

## 2023-08-16 PROCEDURE — 84132 ASSAY OF SERUM POTASSIUM: CPT

## 2023-08-16 PROCEDURE — 97535 SELF CARE MNGMENT TRAINING: CPT

## 2023-08-16 PROCEDURE — 93005 ELECTROCARDIOGRAM TRACING: CPT

## 2023-08-16 PROCEDURE — 97162 PT EVAL MOD COMPLEX 30 MIN: CPT

## 2023-08-16 PROCEDURE — 87086 URINE CULTURE/COLONY COUNT: CPT

## 2023-08-16 PROCEDURE — 97530 THERAPEUTIC ACTIVITIES: CPT

## 2023-08-16 PROCEDURE — 71045 X-RAY EXAM CHEST 1 VIEW: CPT

## 2023-08-16 RX ADMIN — DOCUSATE SODIUM SCH MG: 100 CAPSULE, LIQUID FILLED ORAL at 21:26

## 2023-08-16 RX ADMIN — DILTIAZEM HYDROCHLORIDE SCH: 120 CAPSULE, COATED, EXTENDED RELEASE ORAL at 21:26

## 2023-08-16 RX ADMIN — POTASSIUM CHLORIDE SCH MLS/HR: 7.46 INJECTION, SOLUTION INTRAVENOUS at 22:32

## 2023-08-16 RX ADMIN — SODIUM CHLORIDE, PRESERVATIVE FREE SCH ML: 5 INJECTION INTRAVENOUS at 17:16

## 2023-08-16 RX ADMIN — CARBIDOPA AND LEVODOPA SCH TAB: 25; 100 TABLET ORAL at 21:24

## 2023-08-16 RX ADMIN — CARBIDOPA AND LEVODOPA SCH TAB: 25; 100 TABLET ORAL at 17:15

## 2023-08-16 RX ADMIN — POTASSIUM CHLORIDE SCH MLS/HR: 7.46 INJECTION, SOLUTION INTRAVENOUS at 15:13

## 2023-08-16 RX ADMIN — SODIUM CHLORIDE SCH MLS/HR: 900 INJECTION INTRAVENOUS at 21:22

## 2023-08-16 RX ADMIN — POTASSIUM CHLORIDE SCH MLS/HR: 7.46 INJECTION, SOLUTION INTRAVENOUS at 21:22

## 2023-08-16 RX ADMIN — CARBIDOPA AND LEVODOPA SCH TAB: 25; 100 TABLET ORAL at 23:29

## 2023-08-16 RX ADMIN — CITALOPRAM HYDROBROMIDE SCH MG: 10 TABLET ORAL at 21:26

## 2023-08-16 RX ADMIN — POTASSIUM CHLORIDE SCH MLS/HR: 7.46 INJECTION, SOLUTION INTRAVENOUS at 16:24

## 2023-08-16 NOTE — XRAY REPORT
PROCEDURE:  Chest 1 View X-Ray

 

INDICATIONS:  Chest Pain

 

TECHNIQUE:  One view of the chest was acquired.  

 

COMPARISON:  5/17/2022

 

FINDINGS:  

 

Surgical changes and devices:  None.  

 

Lungs and pleura:  There is either asymmetric pulmonary edema, right greater than left, or pulmonary 
edema superimposed right perihilar pneumonia.

 

Mediastinum:  Mediastinal contours appear normal.  Heart size is normal.  

 

Bones and chest wall:  No suspicious bony lesions.  Overlying soft tissues appear unremarkable.  

 

 

IMPRESSION:  

 

Question asymmetric congestive heart failure versus right-sided perihilar pneumonia superimposed on c
ongestive heart failure.

 

Progress films are recommended until clear.

 

 

 

Reviewed by: Errol Robertson MD on 8/16/2023 11:32 AM PDT

Approved by: Errol Robertson MD on 8/16/2023 11:32 AM PDT

 

 

Station ID:  [id number]

## 2023-08-16 NOTE — PHARMACY PROGRESS NOTE
- Best Possible Medication History


Admit Date and Time: 08/16/23 1339


Processed by: Pharmacy


Medication History completed: Yes


Patient Interview: Pt unable to participate


Secondary Source(s): Written medication list, Pharmacy records, Insurance rec

ords





As the person ultimately responsible for medication therapy, providers are able 

to order a medication from an existing home medication list in [facility] via the 

"Reconcile Routine" prior to Confirmation of that medication by support staff. 

Such practice is discouraged except when the physician, in their clinical 

judgment, deems that a medical need exists for a medication without regard to 

previous use.

## 2023-08-16 NOTE — ED PHYSICIAN DOCUMENTATION
History of Present Illness





- Stated complaint


Stated Complaint: HIGH HR





- History obtained from


History obtained from: EMS





- History of Present Illness


Timing: Today


Pain level max: 0


Pain level now: 0





- Additonal information


Additional information: 





Patient is a 77-year-old female brought in by EMS for elevated heart rate today 

at her nursing home.  No fevers that are reported.  They state the heart rate 

was in the 150s.  She has a history of atrial fibrillation, metoprolol appears 

to have been started 2 days ago but unclear if she has gotten this or not.  

Patient is mostly nonverbal at baseline. She is unable to give any history.  

There is no other information sent with the patient at this time.





Review of Systems


Unable to obtain: Confused, Dementia





PD PAST MEDICAL HISTORY





- Past Medical History


Cardiovascular: Hypertension, High cholesterol, Atrial fibrillation


Respiratory: None


Neuro: Dementia, Parkinson's


Endocrine/Autoimmune: None


GI: Ulcers


GYN: None


: None


HEENT: Chronic vision loss


Psych: Anxiety


Musculoskeletal: Osteoarthritis, Chronic back pain


Derm: None





- Past Surgical History


Past Surgical History: Yes


General: Colonoscopy


Ortho: Hip replacement, Rotator cuff repair





- Present Medications


Home Medications: 


                                Ambulatory Orders











 Medication  Instructions  Recorded  Confirmed


 


Carbidopa/Levodopa 25/100 [Sinemet 2 tab PO 2300 03/18/15 11/15/22





25 mg/100 mg]   


 


Citalopram [CeleXA] 10 mg PO QPM 03/18/15 11/15/22


 


Carbidopa/Levodopa 25/100 [Sinemet 2 tab PO 0900,1300,1700,2100 04/23/19 

11/15/22





25 mg/100 mg]   


 


Aspirin [Marion Aspirin] 81 mg PO DAILY 09/22/21 11/15/22


 


Atorvastatin [Lipitor] 20 mg PO DAILY PM 09/22/21 11/15/22


 


Metoprolol Succinate [Toprol Xl] 25 mg PO DAILY 09/22/21 11/15/22


 


Quetiapine Fumarate [Seroquel] 50 mg PO BID 03/31/22 11/15/22


 


Rivastigmine Tartrate 1.5 mg PO BID 03/31/22 11/15/22





[Rivastigmine]   


 


Bisacodyl Supp [Dulcolax Supp] 10 mg [place] DAILY PRN 10/26/22 11/15/22


 


Docusate Sodium [Stool Softener] 250 mg PO BID 10/26/22 11/15/22














- Allergies


Allergies/Adverse Reactions: 


                                    Allergies











Allergy/AdvReac Type Severity Reaction Status Date / Time


 


No Known Drug Allergies Allergy   Verified 08/16/23 10:55














- Social History


Does the pt smoke?: No


Smoking Status: Never smoker


Does the pt drink ETOH?: No


Does the pt have substance abuse?: Yes





- Immunizations


Immunizations are current?: Yes





- POLST


Patient has POLST: No





PD ED PE NORMAL





- Vitals


Vital signs reviewed: Yes





- General


General: Other (alert, non-verbal)





- HEENT


HEENT: Moist mucous membranes





- Neck


Neck: Supple, no meningeal sign





- Cardiac


Cardiac: Other (tachycardic, irregular)





- Respiratory


Respiratory: No respiratory distress





- Abdomen


Abdomen: Soft, Non tender, Non distended





- Derm


Derm: Warm and dry





- Extremities


Extremities: No calf tenderness / cord





- Neuro


Neuro: Other (alert)





Results





- Vitals


Vitals: 


                               Vital Signs - 24 hr











  08/16/23 08/16/23 08/16/23





  10:52 10:57 11:31


 


Temperature 36.3 C L  


 


Heart Rate 145 H 155 H 156 H


 


Respiratory 16 26 H 27 H





Rate   


 


Blood Pressure 104/74 100/67 119/82 H


 


O2 Saturation 98 99 100














  08/16/23 08/16/23 08/16/23





  12:38 13:03 13:21


 


Temperature   


 


Heart Rate 140 H 146 H 93


 


Respiratory 26 H 15 24





Rate   


 


Blood Pressure 116/69 118/79 103/83 H


 


O2 Saturation 100 95 96








                                     Oxygen











O2 Source [With Activity]      Room air


 


O2 Source [Without Activity]   Room air


 


O2 Source                      Room air

















- EKG (time done)


  ** 1048


EKG releavant findings:: 


EKG personally interpreted by author of this note. Relevant findings are: 





Rate: Rate (enter#) (149)


Rhythm: Atrial fibrillation (RVR)


Axis: Normal


QRS: Normal


Ischemia: Other (rate related changes)





- Labs


Labs: 


                                Laboratory Tests











  08/16/23 08/16/23 08/16/23





  11:05 11:05 11:05


 


WBC  11.4 H  


 


RBC  3.23 L  


 


Hgb  10.0 L  


 


Hct  31.4 L  


 


MCV  97.2  


 


MCH  31.0  


 


MCHC  31.8 L  


 


RDW  13.8  


 


Plt Count  239  


 


MPV  9.7  


 


Neut # (Auto)  9.7 H  


 


Lymph # (Auto)  1.0 L  


 


Mono # (Auto)  0.6  


 


Eos # (Auto)  0.0  


 


Baso # (Auto)  0.0  


 


Absolute Nucleated RBC  0.00  


 


Nucleated RBC %  0.0  


 


Sodium   137 


 


Potassium   3.7 


 


Chloride   103 


 


Carbon Dioxide   24 


 


Anion Gap   10.0 


 


BUN   37 H 


 


Creatinine   1.1 H 


 


Estimated GFR (MDRD)   48 L 


 


Glucose   165 H 


 


Calcium   8.2 L 


 


Total Bilirubin   0.6 


 


AST   130 H 


 


ALT   50 


 


Alkaline Phosphatase   70 


 


Troponin I High Sens   491.0 H* 


 


B-Natriuretic Peptide    823 H


 


Total Protein   6.7 


 


Albumin   3.1 L 


 


Globulin   3.6 


 


Albumin/Globulin Ratio   0.9 L 


 


Lipase   24 














- Rads (name of study)


  ** cxr


Relevant Findings:: Final report received, See rad report





PD Medical Decision Making





- ED course


Complexity details: reviewed results, re-evaluated patient, considered 

differential, d/w family, d/w consultant


ED course: 





Patient was given initial dose of diltiazem IV but her blood pressure dropped.  

She does not appear to be on blood thinners.  Her blood pressure slowly returned

to baseline.  A diltiazem drip was started and her heart rate is coming down 

gradually.  There is a note of atrial fibrillation in her chart from a visit in 

May of last year, unclear if she has had Other episodes of atrial fibrillation. 

Her troponin is elevated, likely rate related.  She is DNR, comfort care.  I 

discussed the case with her  Morgan, who is in [facility] 

currently.  He confirms that she would not want any surgeries, cardiac 

catheterization or other intervention.  We will admit the patient for rate 

control of her A-fib and further care. X-ray findings are more likely related to

asymmetric CHF, possible aspiration pneumonia, but no fevers and no cough.  We 

will hold antibiotics at this time. Discussed the case with Dr. Leavitt, 

hospitalist who accepts





This document was made in part using voice recognition software. While efforts 

are made to proofread this document, sound alike and grammatical errors may 

occur.











IMPRESSION: 





Question asymmetric congestive heart failure versus right-sided perihilar pn

eumonia superimposed on 


congestive heart failure. 





Progress films are recommended until clear. 





Departure





- Departure


Disposition: [facility] DC/Xfer


Clinical Impression: 


 Atrial fibrillation with tachycardic ventricular rate





Pulmonary edema


Qualifiers:


 Chronicity: acute Qualified Code(s): J81.0 - Acute pulmonary edema





Anemia


Qualifiers:


 Anemia type: unspecified type Qualified Code(s): D64.9 - Anemia, unspecified





Condition: Stable

## 2023-08-16 NOTE — HISTORY & PHYSICAL EXAMINATION
Chief Complaint





- Chief Complaint


Chief Complaint: Rapid heart rate found at nursing home, pt sent in to ER





History of Present Illness





- Admitted From


Admitted From:: ED





- History Obtained From


History obtained from: ED provider and chart review





- History of Present Illness


HPI Comment/Other: 





This is a 77-year-old  female with a history of being nonverbal.  She 

lives at [facility].  She has a Hx of dementia and Parkinson's 

disease but her baseline function is not known.  She has a DNR status.  The 

 is currently on a vacation in [facility]





The patient was found to have a heart rate of 150 today by staff at [facility] and sent to the ER.  The patient is nonverbal and had no complaints. 

She did appear overall fatigued but was in no distress.  ER evaluation showed 

that she was in new onset atrial flutter with a rate of 150.  Our EMR shows that

there is a prior history of A-fib, however she is not on anticoagulants, but is 

on daily baby aspirin and Toprol.  In the ER, patient received diltiazem 10 mg 

IV push which dropped her systolic blood pressure to 90. After it recovered, she

was put on a diltiazem drip.  Her BNP is elevated at 823, the first troponin is 

elevated at 490, her WBC is elevated at 11.4, her CXR shows a probable 

pneumonia. The ED provider reached out to her  who confirmed that the 

goal is comfort care, no heroic measures, no transfer for an angiogram or EP 

procedures. The ED provider spoke to me about this patient and she will be 

admitted to the ICU on diltiazem drip.








History





- Past Medical History


Cardiovascular: reports: Hypertension, High cholesterol, Atrial fibrillation


Respiratory: reports: None


Neuro: reports: Dementia, Parkinson's


Endocrine/Autoimmune: reports: None


GI: reports: Ulcers


GYN: reports: None


: reports: None


HEENT: reports: Chronic vision loss


Psych: reports: Anxiety


Musculoskeletal: reports: Osteoarthritis, Chronic back pain


Derm: reports: None


MRSA Hx?: No





- Past Surgical History


General: reports: Colonoscopy


Ortho: reports: Hip replacement, Rotator cuff repair





- Family & Social History


Living arrangement: Nursing home


Social History Notes: Unknown past cigarette or alcohol Hx.





- POLST


Patient has POLST: No





Meds/Allgy





- Home Medications


Home Medications: 


                                Ambulatory Orders











 Medication  Instructions  Recorded  Confirmed


 


Carbidopa/Levodopa 25/100 [Sinemet 2 tab PO HS 03/18/15 08/16/23





25 mg/100 mg]   


 


Citalopram [CeleXA] 10 mg PO QPM 03/18/15 08/16/23


 


Carbidopa/Levodopa 25/100 [Sinemet 2 tab PO QID 04/23/19 08/16/23





25 mg/100 mg]   


 


Atorvastatin [Lipitor] 20 mg PO DAILY PM 09/22/21 08/16/23


 


Metoprolol Succinate [Toprol Xl] 25 mg PO DAILY 09/22/21 08/16/23


 


Quetiapine Fumarate [Seroquel] 50 mg PO BID 03/31/22 08/16/23


 


Rivastigmine Tartrate 3 mg PO BID 03/31/22 08/16/23





[Rivastigmine]   


 


Bisacodyl Supp [Dulcolax Supp] 10 mg [place] DAILY PRN 10/26/22 08/16/23


 


Acetaminophen [Tylenol] 650 mg PO Q6H PRN 08/16/23 08/16/23


 


Aspirin [Pratt Aspirin] 81 mg PO DAILY 08/16/23 08/16/23


 


Docusate Sodium 100Mg Capsule 200 mg PO BID 08/16/23 08/16/23





[Colace 100Mg Capsule]   


 


Mineral Oil [Mineral Oil Enema] 1 unit [place] ONCE PRN 08/16/23 08/16/23


 


Senna [Senokot] 2 tab PO QPM PRN 08/16/23 08/16/23


 


polyethylene glycoL 3350 [Miralax] 17 g PO ONCE PRN 08/16/23 08/16/23














- Allergies


Allergies/Adverse Reactions: 


                                    Allergies











Allergy/AdvReac Type Severity Reaction Status Date / Time


 


No Known Drug Allergies Allergy   Verified 08/16/23 10:55














Review of Systems





- Neurological


Neurological: reports: Other (Non-verbal)





- All Other Systems


All Other Systems: reports: Other (No other details of any symptoms are 

available, since the patient is nonverbal and there is no family present or 

other records avsailable from the nursing home.)





Exam





- Vital Signs


Vital Signs: 





                                Vital Signs x48h











  Temp Pulse Pulse Resp BP BP Pulse Ox


 


 08/16/23 15:00    84  16   112/73  100


 


 08/16/23 14:48    85  21   116/75  97


 


 08/16/23 14:15    84  20   113/67  97


 


 08/16/23 14:00    86  25 H   100/70  100


 


 08/16/23 13:50  37.1 C   95  16   102/69  97


 


 08/16/23 13:21   93   24  103/83 H   96


 


 08/16/23 13:03   146 H   15  118/79   95


 


 08/16/23 12:38   140 H   26 H  116/69   100


 


 08/16/23 11:31   156 H   27 H  119/82 H   100


 


 08/16/23 10:57   155 H   26 H  100/67   99


 


 08/16/23 10:52  36.3 C L  145 H   16  104/74   98














- Physical Exam


General Appearance: positive: No acute distress, Lethargic, Other (Cachectic and

 frail, appears older than her age)


Eyes Bilateral: positive: No lid inflammation


ENT: positive: Dry mucous membranes


Neck: positive: Thyroid nml, Other (Positive JVD with the patient at a 50 degree

 operating)


Respiratory: positive: No respiratory distress, Breath sounds nml, Other 

(Prominent ribs)


Cardiovascular: positive: Regular rate & rhythm (No murmur audible)


Abdomen: positive: Non-tender, Nml bowel sounds, No distention


Skin: positive: Warm, Dry, Pallor, Other (Positive skin tenting. Positive muscle

 wasting.)


Extremities: positive: Non-tender, No pedal edema


Neurologic/Psychiatric: positive: Other (She is moving all 4 extremities 

spontaneously.  She has her eyes closed.  She is nonverbal.)





Conclusion/Plan





- Problem List


(1) Atrial flutter with rapid ventricular response


Conclusion/Plan: 


The finding of a pneumonia could be the reason the patient went into new atrial 

flutter.


Plan:


A diltiazem drip has been started for rate control, so the patient will need to 

be admitted to the ICU. Titrate diltiazem to achieve heart rate less than 100>> 

Upon presentation to the ICU the patient converted to suddenly to NSR at a rate 

of 80. I will keep her on diltiazem overnight and begin oral Cardizem in a.m.  

We will also have her medication list reconciled by then to see what she was 

already taking for heart rate control meds.


This patient's CHADS2 score equals 3 (age greater than 75, hypertension, CHF), 

thus she would be a candidate for anticoagulation.  In the [facility] oncology note 

there is mention of Parkinson's with frequent falls. Therefore I will not start 

anticoagulation and continue the 1 baby aspirin daily, which she is on.


Obtain a set of troponins to rule out ACS as the cause of the new a flutter


Check TSH to rule out hyperthyroidism








(2) Pulmonary edema


Conclusion/Plan: 


Her chest x-ray was read as having a probable infiltrate and CHF. She probably 

went into flash pulmonary edema because of sudden rapid HR.


Her periphery however is dehydrated with skin tenting.


Plan:


Because the patient does not have hypoxia, I will not start her on Lasix.  I 

will let the fluids equilibrate.


I will work on slowing down her heart rate to help prevent more pulmonary edema


I will order a set of troponins to rule out ACS


Follow her BNP daily


Start her cardiac meds once the list is reconciled by pharmacy


Qualifiers: 


   Chronicity: acute   Qualified Code(s): J81.0 - Acute pulmonary edema   





(3) HCAP (healthcare-associated pneumonia)


Conclusion/Plan: 


The patient does not have a cough but this could be because she is 

intravascularly depleted. WBC is elevated. CXR shows an infiltrate.


The patient lives in a nursing home and therefore she may have a healthcare 

associated pneumonia.


Blood cultures were done and a sputum culture has been ordered.


Plan:


I will start the patient on empiric IV cefepime and IV Vanco


Await cx results to tailor antibx








(4) AMS (altered mental status)


Conclusion/Plan: 


She carries a diagnosis of dementia and Parkinson's  Patient is currently 

nonverbal, he is in no distress, answered her name when asked by the ICU nurse. 

 


It is unknown if this level of activity is her baseline.


Plan:


I will restart any dementia and Parkinson's meds that the patient is on, after 

the med list is reconciled by pharmacy








(5) Dementia associated with Parkinson's disease


Conclusion/Plan: 


As per history.  Patient has no tremor as I examine her however she is minimally

 communicative which may be her flat affect.


I reviewed her EMR.  In the [facility] oncology note there is mention of Parkinson's 

with frequent falls


Plan:


I will resume her Parkinson's meds when the med list is reconciled by pharmacy








(6) Colon cancer


Conclusion/Plan: 


I reviewed her EMR.  The patient is followed in oncology [facility] clinic care.  She 

has colon cancer.








- Lab Results


Fish Bones: 


                                 08/16/23 11:05





                                 08/16/23 11:05





- Diagnostic Imaging Results


Diagnostic Imaging Results: positive: Final report reviewed





- EKG Results


EKG Interpreted Independently: Yes


EKG Comparison: Changed from prior EKG


EKG Findings: 





Atrial flutter, with a regular rhythm at a vent rate of 150, low voltage, 

diffusely flat/minimally inverted T waves. Since EKG from 5/17/2022, all 

findings are new.








- Other


Other Results/Comments: 





Attestation: The patient is expected to be hospitalized for greater than 2 

midnights and is expected to be discharged or transferred to another facility 

within 96 hours: Yes

## 2023-08-16 NOTE — PHARMACY PROGRESS NOTE
- Therapy Status


Vancomycin regimen day #: 1


Therapy status: Awaiting steady state


Basis for treatment: Empirical


Treatment indication: 


HCAP


Trough goal: 15-20


Concurrent antibiotics: 





CEFEPIME





- JUAN Risk


Risk level for Acute Kidney Injury: High (RISK FACTORS:(BUN:SCr >20:1),ICU 

ADMISSION,GOAL TROUGH >15)


Acute Kidney Injury risk factors: Baseline CrCl <50, Baseline BUN:SCr >20:1, 

Goal trough >15, Admission to ICU





- Monitoring and Recommendation


Clinical response to treatment: 


I&O Previous 24 hours











 08/14/23 08/15/23 08/16/23





 23:59 23:59 23:59


 


Intake Total   110.167


 


Output Total   150


 


Balance   -39.833








Lab Results











  08/16/23





  11:05


 


BUN  37 H


 


Creatinine  1.1 H


 


Estimated GFR (MDRD)  48 L

## 2023-08-17 LAB
ALBUMIN DIAFP-MCNC: 3.1 G/DL (ref 3.2–5.5)
ALBUMIN/GLOB SERPL: 1 {RATIO} (ref 1–2.2)
ALP SERPL-CCNC: 66 IU/L (ref 42–121)
ALT SERPL W P-5'-P-CCNC: 17 IU/L (ref 10–60)
ANION GAP SERPL CALCULATED.4IONS-SCNC: 8 MMOL/L (ref 6–13)
AST SERPL W P-5'-P-CCNC: 55 IU/L (ref 10–42)
BASOPHILS NFR BLD AUTO: 0 10^3/UL (ref 0–0.1)
BASOPHILS NFR BLD AUTO: 0.3 %
BILIRUB BLD-MCNC: 0.7 MG/DL (ref 0.2–1)
BUN SERPL-MCNC: 34 MG/DL (ref 6–20)
CA-I SERPL ISE-MCNC: 1.01 MMOL/L (ref 1.15–1.33)
CA-I SERPL ISE-MCNC: 1.02 MMOL/L (ref 1.15–1.33)
CA-I SERPL ISE-MCNC: 1.14 MMOL/L (ref 1.15–1.33)
CALCIUM UR-MCNC: 8 MG/DL (ref 8.5–10.3)
CHLORIDE SERPL-SCNC: 107 MMOL/L (ref 101–111)
CO2 SERPL-SCNC: 21 MMOL/L (ref 21–32)
CREAT SERPLBLD-SCNC: 0.9 MG/DL (ref 0.4–1)
EOSINOPHIL # BLD AUTO: 0.2 10^3/UL (ref 0–0.7)
EOSINOPHIL NFR BLD AUTO: 1.5 %
ERYTHROCYTE [DISTWIDTH] IN BLOOD BY AUTOMATED COUNT: 14 % (ref 12–15)
GFRSERPLBLD MDRD-ARVRAT: 61 ML/MIN/{1.73_M2} (ref 89–?)
GLOBULIN SER-MCNC: 3 G/DL (ref 2.1–4.2)
GLUCOSE SERPL-MCNC: 126 MG/DL (ref 70–100)
HCT VFR BLD AUTO: 31.3 % (ref 37–47)
HGB UR QL STRIP: 9.7 G/DL (ref 12–16)
LYMPHOCYTES # SPEC AUTO: 1.3 10^3/UL (ref 1.5–3.5)
LYMPHOCYTES NFR BLD AUTO: 12.7 %
MAGNESIUM SERPL-MCNC: 2 MG/DL (ref 1.7–2.8)
MCH RBC QN AUTO: 31.3 PG (ref 27–31)
MCHC RBC AUTO-ENTMCNC: 31 G/DL (ref 32–36)
MCV RBC AUTO: 101 FL (ref 81–99)
MONOCYTES # BLD AUTO: 0.7 10^3/UL (ref 0–1)
MONOCYTES NFR BLD AUTO: 6.6 %
NEUTROPHILS # BLD AUTO: 7.8 10^3/UL (ref 1.5–6.6)
NEUTROPHILS # SNV AUTO: 10 X10^3/UL (ref 4.8–10.8)
NEUTROPHILS NFR BLD AUTO: 78.5 %
NRBC # BLD AUTO: 0 /100WBC
NRBC # BLD AUTO: 0 X10^3/UL
PDW BLD AUTO: 9.4 FL (ref 7.9–10.8)
PH BLDV: 7.44 [PH] (ref 7.31–7.41)
PH BLDV: 7.44 [PH] (ref 7.31–7.41)
PH BLDV: 7.53 [PH] (ref 7.31–7.41)
PHOSPHATE BLD-MCNC: 2.8 MG/DL (ref 2.5–4.6)
PLATELET # BLD: 217 10^3/UL (ref 130–450)
POTASSIUM SERPL-SCNC: 4.3 MMOL/L (ref 3.5–5)
PROT SPEC-MCNC: 6.1 G/DL (ref 6.7–8.2)
RBC MAR: 3.1 10^6/UL (ref 4.2–5.4)
SODIUM SERPLBLD-SCNC: 136 MMOL/L (ref 135–145)

## 2023-08-17 RX ADMIN — ASPIRIN SCH MG: 81 TABLET, CHEWABLE ORAL at 08:31

## 2023-08-17 RX ADMIN — CARBIDOPA AND LEVODOPA SCH TAB: 25; 100 TABLET ORAL at 17:39

## 2023-08-17 RX ADMIN — FAMOTIDINE SCH MG: 20 TABLET, FILM COATED ORAL at 08:42

## 2023-08-17 RX ADMIN — SODIUM CHLORIDE, PRESERVATIVE FREE SCH ML: 5 INJECTION INTRAVENOUS at 08:38

## 2023-08-17 RX ADMIN — METOPROLOL SUCCINATE SCH MG: 25 TABLET, EXTENDED RELEASE ORAL at 20:51

## 2023-08-17 RX ADMIN — ENOXAPARIN SODIUM SCH MG: 100 INJECTION SUBCUTANEOUS at 08:38

## 2023-08-17 RX ADMIN — SODIUM CHLORIDE, PRESERVATIVE FREE PRN ML: 5 INJECTION INTRAVENOUS at 21:07

## 2023-08-17 RX ADMIN — SODIUM CHLORIDE, PRESERVATIVE FREE SCH ML: 5 INJECTION INTRAVENOUS at 18:48

## 2023-08-17 RX ADMIN — SODIUM CHLORIDE SCH MLS/HR: 900 INJECTION INTRAVENOUS at 20:33

## 2023-08-17 RX ADMIN — ACETAMINOPHEN PRN MG: 325 TABLET ORAL at 20:50

## 2023-08-17 RX ADMIN — CARBIDOPA AND LEVODOPA SCH TAB: 25; 100 TABLET ORAL at 20:50

## 2023-08-17 RX ADMIN — DILTIAZEM HYDROCHLORIDE SCH: 120 CAPSULE, COATED, EXTENDED RELEASE ORAL at 09:16

## 2023-08-17 RX ADMIN — METOPROLOL SUCCINATE SCH MG: 25 TABLET, EXTENDED RELEASE ORAL at 08:30

## 2023-08-17 RX ADMIN — THERA TABS SCH TAB: TAB at 14:11

## 2023-08-17 RX ADMIN — DILTIAZEM HYDROCHLORIDE SCH: 120 CAPSULE, COATED, EXTENDED RELEASE ORAL at 20:51

## 2023-08-17 RX ADMIN — CARBIDOPA AND LEVODOPA SCH TAB: 25; 100 TABLET ORAL at 14:11

## 2023-08-17 RX ADMIN — SODIUM CHLORIDE, PRESERVATIVE FREE SCH ML: 5 INJECTION INTRAVENOUS at 00:21

## 2023-08-17 RX ADMIN — DOCUSATE SODIUM SCH MG: 100 CAPSULE, LIQUID FILLED ORAL at 08:35

## 2023-08-17 RX ADMIN — Medication SCH MG: at 08:35

## 2023-08-17 RX ADMIN — CARBIDOPA AND LEVODOPA SCH TAB: 25; 100 TABLET ORAL at 08:35

## 2023-08-17 RX ADMIN — DOCUSATE SODIUM SCH MG: 100 CAPSULE, LIQUID FILLED ORAL at 20:51

## 2023-08-17 RX ADMIN — Medication SCH MG: at 17:39

## 2023-08-17 RX ADMIN — CITALOPRAM HYDROBROMIDE SCH MG: 10 TABLET ORAL at 20:51

## 2023-08-17 RX ADMIN — SODIUM CHLORIDE SCH MLS/HR: 900 INJECTION INTRAVENOUS at 10:09

## 2023-08-18 LAB
ANION GAP SERPL CALCULATED.4IONS-SCNC: 5 MMOL/L (ref 6–13)
BASOPHILS NFR BLD AUTO: 0.1 10^3/UL (ref 0–0.1)
BASOPHILS NFR BLD AUTO: 0.6 %
BUN SERPL-MCNC: 24 MG/DL (ref 6–20)
CA-I SERPL ISE-MCNC: 1.12 MMOL/L (ref 1.15–1.33)
CALCIUM UR-MCNC: 8.5 MG/DL (ref 8.5–10.3)
CHLORIDE SERPL-SCNC: 109 MMOL/L (ref 101–111)
CO2 SERPL-SCNC: 24 MMOL/L (ref 21–32)
CREAT SERPLBLD-SCNC: 1 MG/DL (ref 0.6–1.3)
EOSINOPHIL # BLD AUTO: 0.3 10^3/UL (ref 0–0.7)
EOSINOPHIL NFR BLD AUTO: 2.9 %
ERYTHROCYTE [DISTWIDTH] IN BLOOD BY AUTOMATED COUNT: 14.1 % (ref 12–15)
GFRSERPLBLD MDRD-ARVRAT: 54 ML/MIN/{1.73_M2} (ref 89–?)
GLUCOSE SERPL-MCNC: 93 MG/DL (ref 74–104)
HCT VFR BLD AUTO: 30.8 % (ref 37–47)
HGB UR QL STRIP: 10.1 G/DL (ref 12–16)
LYMPHOCYTES # SPEC AUTO: 0.7 10^3/UL (ref 1.5–3.5)
LYMPHOCYTES NFR BLD AUTO: 8.2 %
MCH RBC QN AUTO: 31.8 PG (ref 27–31)
MCHC RBC AUTO-ENTMCNC: 32.8 G/DL (ref 32–36)
MCV RBC AUTO: 96.9 FL (ref 81–99)
MONOCYTES # BLD AUTO: 0.6 10^3/UL (ref 0–1)
MONOCYTES NFR BLD AUTO: 6.7 %
NEUTROPHILS # BLD AUTO: 7.2 10^3/UL (ref 1.5–6.6)
NEUTROPHILS # SNV AUTO: 8.9 X10^3/UL (ref 4.8–10.8)
NEUTROPHILS NFR BLD AUTO: 81 %
NRBC # BLD AUTO: 0 /100WBC
NRBC # BLD AUTO: 0 X10^3/UL
PDW BLD AUTO: 8.8 FL (ref 7.9–10.8)
PH BLDV: 7.4 [PH] (ref 7.31–7.41)
PHOSPHATE BLD-MCNC: 3.4 MG/DL (ref 2.5–4.6)
PLATELET # BLD: 230 10^3/UL (ref 130–450)
POTASSIUM SERPL-SCNC: 4.1 MMOL/L (ref 3.5–4.5)
RBC MAR: 3.18 10^6/UL (ref 4.2–5.4)
SODIUM SERPLBLD-SCNC: 138 MMOL/L (ref 135–145)
TSH SERPL-ACNC: 1.38 UIU/ML (ref 0.34–5.6)

## 2023-08-18 RX ADMIN — DILTIAZEM HYDROCHLORIDE SCH EACH: 120 CAPSULE, COATED, EXTENDED RELEASE ORAL at 20:26

## 2023-08-18 RX ADMIN — SODIUM CHLORIDE, PRESERVATIVE FREE SCH: 5 INJECTION INTRAVENOUS at 00:00

## 2023-08-18 RX ADMIN — SODIUM CHLORIDE, PRESERVATIVE FREE SCH ML: 5 INJECTION INTRAVENOUS at 17:08

## 2023-08-18 RX ADMIN — ASPIRIN SCH MG: 81 TABLET, CHEWABLE ORAL at 09:18

## 2023-08-18 RX ADMIN — ENOXAPARIN SODIUM SCH MG: 100 INJECTION SUBCUTANEOUS at 09:49

## 2023-08-18 RX ADMIN — METOPROLOL TARTRATE SCH MG: 25 TABLET, FILM COATED ORAL at 20:26

## 2023-08-18 RX ADMIN — DEXTROSE MONOHYDRATE SCH MLS/HR: 50 INJECTION, SOLUTION INTRAVENOUS at 11:22

## 2023-08-18 RX ADMIN — Medication SCH MG: at 17:08

## 2023-08-18 RX ADMIN — SODIUM CHLORIDE SCH MLS/HR: 900 INJECTION INTRAVENOUS at 09:25

## 2023-08-18 RX ADMIN — DOCUSATE SODIUM SCH MG: 100 CAPSULE, LIQUID FILLED ORAL at 20:26

## 2023-08-18 RX ADMIN — CARBIDOPA AND LEVODOPA SCH TAB: 25; 100 TABLET ORAL at 09:18

## 2023-08-18 RX ADMIN — DILTIAZEM HYDROCHLORIDE SCH EACH: 120 CAPSULE, COATED, EXTENDED RELEASE ORAL at 13:49

## 2023-08-18 RX ADMIN — METOPROLOL SUCCINATE SCH MG: 25 TABLET, EXTENDED RELEASE ORAL at 09:14

## 2023-08-18 RX ADMIN — CITALOPRAM HYDROBROMIDE SCH MG: 10 TABLET ORAL at 20:26

## 2023-08-18 RX ADMIN — CARBIDOPA AND LEVODOPA SCH TAB: 25; 100 TABLET ORAL at 12:48

## 2023-08-18 RX ADMIN — CARBIDOPA AND LEVODOPA SCH TAB: 25; 100 TABLET ORAL at 22:55

## 2023-08-18 RX ADMIN — SODIUM CHLORIDE, PRESERVATIVE FREE SCH ML: 5 INJECTION INTRAVENOUS at 09:20

## 2023-08-18 RX ADMIN — CARBIDOPA AND LEVODOPA SCH TAB: 25; 100 TABLET ORAL at 17:08

## 2023-08-18 RX ADMIN — CARBIDOPA AND LEVODOPA SCH TAB: 25; 100 TABLET ORAL at 20:26

## 2023-08-18 RX ADMIN — THERA TABS SCH TAB: TAB at 09:18

## 2023-08-18 RX ADMIN — Medication SCH MG: at 09:18

## 2023-08-18 RX ADMIN — DOCUSATE SODIUM SCH MG: 100 CAPSULE, LIQUID FILLED ORAL at 09:19

## 2023-08-18 RX ADMIN — CARBIDOPA AND LEVODOPA SCH: 25; 100 TABLET ORAL at 00:00

## 2023-08-18 RX ADMIN — FAMOTIDINE SCH MG: 20 TABLET, FILM COATED ORAL at 09:49

## 2023-08-19 LAB
ANION GAP SERPL CALCULATED.4IONS-SCNC: 4 MMOL/L (ref 6–13)
BASOPHILS NFR BLD AUTO: 0 10^3/UL (ref 0–0.1)
BASOPHILS NFR BLD AUTO: 0.5 %
BUN SERPL-MCNC: 19 MG/DL (ref 6–20)
CA-I SERPL ISE-MCNC: 1.12 MMOL/L (ref 1.15–1.33)
CALCIUM UR-MCNC: 8.5 MG/DL (ref 8.5–10.3)
CHLORIDE SERPL-SCNC: 110 MMOL/L (ref 101–111)
CO2 SERPL-SCNC: 26 MMOL/L (ref 21–32)
CREAT SERPLBLD-SCNC: 0.8 MG/DL (ref 0.6–1.3)
EOSINOPHIL # BLD AUTO: 0.3 10^3/UL (ref 0–0.7)
EOSINOPHIL NFR BLD AUTO: 4 %
ERYTHROCYTE [DISTWIDTH] IN BLOOD BY AUTOMATED COUNT: 14.1 % (ref 12–15)
GFRSERPLBLD MDRD-ARVRAT: 69 ML/MIN/{1.73_M2} (ref 89–?)
GLUCOSE SERPL-MCNC: 97 MG/DL (ref 74–104)
HCT VFR BLD AUTO: 31.3 % (ref 37–47)
HGB UR QL STRIP: 9.9 G/DL (ref 12–16)
LYMPHOCYTES # SPEC AUTO: 1.2 10^3/UL (ref 1.5–3.5)
LYMPHOCYTES NFR BLD AUTO: 15.1 %
MCH RBC QN AUTO: 30.7 PG (ref 27–31)
MCHC RBC AUTO-ENTMCNC: 31.6 G/DL (ref 32–36)
MCV RBC AUTO: 97.2 FL (ref 81–99)
MONOCYTES # BLD AUTO: 0.6 10^3/UL (ref 0–1)
MONOCYTES NFR BLD AUTO: 8 %
NEUTROPHILS # BLD AUTO: 5.7 10^3/UL (ref 1.5–6.6)
NEUTROPHILS # SNV AUTO: 8 X10^3/UL (ref 4.8–10.8)
NEUTROPHILS NFR BLD AUTO: 71.9 %
NRBC # BLD AUTO: 0 /100WBC
NRBC # BLD AUTO: 0 X10^3/UL
PDW BLD AUTO: 8.6 FL (ref 7.9–10.8)
PH BLDV: 7.42 [PH] (ref 7.31–7.41)
PHOSPHATE BLD-MCNC: 3.1 MG/DL (ref 2.5–5)
PLATELET # BLD: 242 10^3/UL (ref 130–450)
POTASSIUM SERPL-SCNC: 4 MMOL/L (ref 3.5–4.5)
RBC MAR: 3.22 10^6/UL (ref 4.2–5.4)
SODIUM SERPLBLD-SCNC: 140 MMOL/L (ref 135–145)

## 2023-08-19 RX ADMIN — FAMOTIDINE SCH MG: 20 TABLET, FILM COATED ORAL at 10:08

## 2023-08-19 RX ADMIN — CARBIDOPA AND LEVODOPA SCH TAB: 25; 100 TABLET ORAL at 13:08

## 2023-08-19 RX ADMIN — CARBIDOPA AND LEVODOPA SCH TAB: 25; 100 TABLET ORAL at 23:02

## 2023-08-19 RX ADMIN — DEXTROSE MONOHYDRATE SCH MLS/HR: 50 INJECTION, SOLUTION INTRAVENOUS at 09:38

## 2023-08-19 RX ADMIN — CARBIDOPA AND LEVODOPA SCH TAB: 25; 100 TABLET ORAL at 17:16

## 2023-08-19 RX ADMIN — DILTIAZEM HYDROCHLORIDE SCH EACH: 120 CAPSULE, COATED, EXTENDED RELEASE ORAL at 09:51

## 2023-08-19 RX ADMIN — DOCUSATE SODIUM SCH MG: 100 CAPSULE, LIQUID FILLED ORAL at 21:02

## 2023-08-19 RX ADMIN — METOPROLOL TARTRATE SCH MG: 25 TABLET, FILM COATED ORAL at 09:51

## 2023-08-19 RX ADMIN — Medication SCH MG: at 10:06

## 2023-08-19 RX ADMIN — DILTIAZEM HYDROCHLORIDE SCH EACH: 120 CAPSULE, COATED, EXTENDED RELEASE ORAL at 21:03

## 2023-08-19 RX ADMIN — SODIUM CHLORIDE, PRESERVATIVE FREE SCH ML: 5 INJECTION INTRAVENOUS at 17:16

## 2023-08-19 RX ADMIN — SODIUM CHLORIDE, PRESERVATIVE FREE SCH ML: 5 INJECTION INTRAVENOUS at 10:08

## 2023-08-19 RX ADMIN — THERA TABS SCH TAB: TAB at 10:08

## 2023-08-19 RX ADMIN — DOCUSATE SODIUM SCH MG: 100 CAPSULE, LIQUID FILLED ORAL at 10:08

## 2023-08-19 RX ADMIN — ASPIRIN SCH MG: 81 TABLET, CHEWABLE ORAL at 10:07

## 2023-08-19 RX ADMIN — METOPROLOL TARTRATE SCH MG: 25 TABLET, FILM COATED ORAL at 21:02

## 2023-08-19 RX ADMIN — CARBIDOPA AND LEVODOPA SCH TAB: 25; 100 TABLET ORAL at 10:07

## 2023-08-19 RX ADMIN — CITALOPRAM HYDROBROMIDE SCH MG: 10 TABLET ORAL at 21:02

## 2023-08-19 RX ADMIN — Medication SCH MG: at 17:17

## 2023-08-19 RX ADMIN — CARBIDOPA AND LEVODOPA SCH TAB: 25; 100 TABLET ORAL at 21:02

## 2023-08-19 RX ADMIN — ENOXAPARIN SODIUM SCH MG: 100 INJECTION SUBCUTANEOUS at 09:37

## 2023-08-19 RX ADMIN — SODIUM CHLORIDE, PRESERVATIVE FREE SCH ML: 5 INJECTION INTRAVENOUS at 05:14

## 2023-08-20 LAB
ANION GAP SERPL CALCULATED.4IONS-SCNC: 5 MMOL/L (ref 6–13)
BUN SERPL-MCNC: 17 MG/DL (ref 6–20)
CALCIUM UR-MCNC: 8.5 MG/DL (ref 8.5–10.3)
CHLORIDE SERPL-SCNC: 111 MMOL/L (ref 101–111)
CO2 SERPL-SCNC: 25 MMOL/L (ref 21–32)
CREAT SERPLBLD-SCNC: 0.7 MG/DL (ref 0.6–1.3)
GFRSERPLBLD MDRD-ARVRAT: 81 ML/MIN/{1.73_M2} (ref 89–?)
GLUCOSE SERPL-MCNC: 99 MG/DL (ref 74–104)
POTASSIUM SERPL-SCNC: 4.2 MMOL/L (ref 3.5–4.5)
SODIUM SERPLBLD-SCNC: 141 MMOL/L (ref 135–145)

## 2023-08-20 RX ADMIN — CARBIDOPA AND LEVODOPA SCH TAB: 25; 100 TABLET ORAL at 08:57

## 2023-08-20 RX ADMIN — SODIUM CHLORIDE, PRESERVATIVE FREE SCH ML: 5 INJECTION INTRAVENOUS at 23:42

## 2023-08-20 RX ADMIN — Medication SCH MG: at 08:58

## 2023-08-20 RX ADMIN — DILTIAZEM HYDROCHLORIDE SCH EACH: 120 CAPSULE, COATED, EXTENDED RELEASE ORAL at 20:59

## 2023-08-20 RX ADMIN — SODIUM CHLORIDE, PRESERVATIVE FREE SCH ML: 5 INJECTION INTRAVENOUS at 15:55

## 2023-08-20 RX ADMIN — DEXTROSE MONOHYDRATE SCH MLS/HR: 50 INJECTION, SOLUTION INTRAVENOUS at 08:56

## 2023-08-20 RX ADMIN — SODIUM CHLORIDE, PRESERVATIVE FREE SCH ML: 5 INJECTION INTRAVENOUS at 08:52

## 2023-08-20 RX ADMIN — DILTIAZEM HYDROCHLORIDE SCH EACH: 120 CAPSULE, COATED, EXTENDED RELEASE ORAL at 08:58

## 2023-08-20 RX ADMIN — DOCUSATE SODIUM SCH MG: 100 CAPSULE, LIQUID FILLED ORAL at 08:58

## 2023-08-20 RX ADMIN — Medication SCH MG: at 17:01

## 2023-08-20 RX ADMIN — FAMOTIDINE SCH MG: 20 TABLET, FILM COATED ORAL at 08:58

## 2023-08-20 RX ADMIN — SODIUM CHLORIDE, PRESERVATIVE FREE PRN ML: 5 INJECTION INTRAVENOUS at 13:43

## 2023-08-20 RX ADMIN — CARBIDOPA AND LEVODOPA SCH TAB: 25; 100 TABLET ORAL at 17:01

## 2023-08-20 RX ADMIN — SODIUM CHLORIDE, PRESERVATIVE FREE SCH ML: 5 INJECTION INTRAVENOUS at 04:37

## 2023-08-20 RX ADMIN — FUROSEMIDE SCH MG: 10 INJECTION INTRAMUSCULAR; INTRAVENOUS at 08:52

## 2023-08-20 RX ADMIN — ENOXAPARIN SODIUM SCH MG: 100 INJECTION SUBCUTANEOUS at 08:52

## 2023-08-20 RX ADMIN — DOCUSATE SODIUM SCH MG: 100 CAPSULE, LIQUID FILLED ORAL at 21:02

## 2023-08-20 RX ADMIN — CITALOPRAM HYDROBROMIDE SCH MG: 10 TABLET ORAL at 20:58

## 2023-08-20 RX ADMIN — THERA TABS SCH TAB: TAB at 08:58

## 2023-08-20 RX ADMIN — CARBIDOPA AND LEVODOPA SCH TAB: 25; 100 TABLET ORAL at 23:41

## 2023-08-20 RX ADMIN — FUROSEMIDE SCH MG: 10 INJECTION INTRAMUSCULAR; INTRAVENOUS at 13:42

## 2023-08-20 RX ADMIN — CARBIDOPA AND LEVODOPA SCH TAB: 25; 100 TABLET ORAL at 20:58

## 2023-08-20 RX ADMIN — METOPROLOL TARTRATE SCH MG: 25 TABLET, FILM COATED ORAL at 08:59

## 2023-08-20 RX ADMIN — CARBIDOPA AND LEVODOPA SCH TAB: 25; 100 TABLET ORAL at 12:39

## 2023-08-20 RX ADMIN — METOPROLOL TARTRATE SCH MG: 25 TABLET, FILM COATED ORAL at 20:58

## 2023-08-20 RX ADMIN — ASPIRIN SCH MG: 81 TABLET, CHEWABLE ORAL at 08:57

## 2023-08-20 NOTE — XRAY REPORT
PROCEDURE:  Chest 1 View X-Ray

 

INDICATIONS:  Hypoxia

 

TECHNIQUE:  One view of the chest was acquired.  

 

COMPARISON:  Chest x-ray 8/16/2023

 

FINDINGS:  

 

Surgical changes and devices:  None.  

 

Lungs and pleura:  Prominent interstitial markings, most notably in the perihilar regions, right grea
ter than left. This is increased compared to prior exam. Small pleural effusions.

 

Mediastinum:  Mediastinal contours appear normal.  Heart size is normal.  

 

Bones and chest wall:  No suspicious bony lesions.  Overlying soft tissues appear unremarkable.  

 

 

IMPRESSION:  

 

Increase in bilateral perihilar interstitial markings, likely representing pulmonary edema. New small
 bilateral pleural effusions. Superimposed infection is not excluded.

 

 

 

Reviewed by: Kalyan Chacon MD on 8/20/2023 8:08 AM PDT

Approved by: Kalyan Chacon MD on 8/20/2023 8:08 AM PDT

 

 

Station ID:  [id number]

## 2023-08-21 RX ADMIN — THERA TABS SCH TAB: TAB at 08:31

## 2023-08-21 RX ADMIN — SODIUM CHLORIDE, PRESERVATIVE FREE SCH ML: 5 INJECTION INTRAVENOUS at 17:34

## 2023-08-21 RX ADMIN — FUROSEMIDE SCH MG: 10 INJECTION INTRAMUSCULAR; INTRAVENOUS at 06:14

## 2023-08-21 RX ADMIN — ENOXAPARIN SODIUM SCH MG: 100 INJECTION SUBCUTANEOUS at 08:40

## 2023-08-21 RX ADMIN — DILTIAZEM HYDROCHLORIDE SCH EACH: 120 CAPSULE, COATED, EXTENDED RELEASE ORAL at 21:25

## 2023-08-21 RX ADMIN — METOPROLOL TARTRATE SCH MG: 25 TABLET, FILM COATED ORAL at 21:25

## 2023-08-21 RX ADMIN — ACETAMINOPHEN PRN MG: 325 TABLET ORAL at 17:40

## 2023-08-21 RX ADMIN — CARBIDOPA AND LEVODOPA SCH TAB: 25; 100 TABLET ORAL at 08:30

## 2023-08-21 RX ADMIN — FUROSEMIDE SCH MG: 10 INJECTION INTRAMUSCULAR; INTRAVENOUS at 13:54

## 2023-08-21 RX ADMIN — CITALOPRAM HYDROBROMIDE SCH MG: 10 TABLET ORAL at 21:25

## 2023-08-21 RX ADMIN — CARBIDOPA AND LEVODOPA SCH TAB: 25; 100 TABLET ORAL at 13:00

## 2023-08-21 RX ADMIN — FAMOTIDINE SCH MG: 20 TABLET, FILM COATED ORAL at 08:31

## 2023-08-21 RX ADMIN — ACETAMINOPHEN PRN MG: 325 TABLET ORAL at 10:45

## 2023-08-21 RX ADMIN — Medication SCH MG: at 08:32

## 2023-08-21 RX ADMIN — Medication SCH MG: at 17:34

## 2023-08-21 RX ADMIN — DOCUSATE SODIUM SCH: 100 CAPSULE, LIQUID FILLED ORAL at 21:26

## 2023-08-21 RX ADMIN — ASPIRIN SCH MG: 81 TABLET, CHEWABLE ORAL at 08:30

## 2023-08-21 RX ADMIN — DOCUSATE SODIUM SCH MG: 100 CAPSULE, LIQUID FILLED ORAL at 08:29

## 2023-08-21 RX ADMIN — DILTIAZEM HYDROCHLORIDE SCH EACH: 120 CAPSULE, COATED, EXTENDED RELEASE ORAL at 08:29

## 2023-08-21 RX ADMIN — METOPROLOL TARTRATE SCH MG: 25 TABLET, FILM COATED ORAL at 08:31

## 2023-08-21 RX ADMIN — SODIUM CHLORIDE, PRESERVATIVE FREE SCH ML: 5 INJECTION INTRAVENOUS at 08:41

## 2023-08-21 RX ADMIN — CARBIDOPA AND LEVODOPA SCH TAB: 25; 100 TABLET ORAL at 17:34

## 2023-08-21 RX ADMIN — CARBIDOPA AND LEVODOPA SCH TAB: 25; 100 TABLET ORAL at 21:25

## 2023-08-22 LAB
ALBUMIN DIAFP-MCNC: 3.2 G/DL (ref 3.2–5.5)
ALBUMIN/GLOB SERPL: 1.1 {RATIO} (ref 1–2.2)
ALP SERPL-CCNC: 79 IU/L (ref 42–121)
ALT SERPL W P-5'-P-CCNC: 16 IU/L (ref 10–60)
ANION GAP SERPL CALCULATED.4IONS-SCNC: 6 MMOL/L (ref 6–13)
AST SERPL W P-5'-P-CCNC: 20 IU/L (ref 10–42)
BASOPHILS NFR BLD AUTO: 0.1 10^3/UL (ref 0–0.1)
BASOPHILS NFR BLD AUTO: 0.5 %
BILIRUB BLD-MCNC: 0.4 MG/DL (ref 0.2–1)
BUN SERPL-MCNC: 19 MG/DL (ref 6–20)
CALCIUM UR-MCNC: 9 MG/DL (ref 8.5–10.3)
CHLORIDE SERPL-SCNC: 105 MMOL/L (ref 101–111)
CO2 SERPL-SCNC: 34 MMOL/L (ref 21–32)
CREAT SERPLBLD-SCNC: 1 MG/DL (ref 0.6–1.3)
EOSINOPHIL # BLD AUTO: 0.4 10^3/UL (ref 0–0.7)
EOSINOPHIL NFR BLD AUTO: 3.1 %
ERYTHROCYTE [DISTWIDTH] IN BLOOD BY AUTOMATED COUNT: 14.2 % (ref 12–15)
GFRSERPLBLD MDRD-ARVRAT: 54 ML/MIN/{1.73_M2} (ref 89–?)
GLOBULIN SER-MCNC: 2.9 G/DL (ref 2.1–4.2)
GLUCOSE SERPL-MCNC: 104 MG/DL (ref 74–104)
HCT VFR BLD AUTO: 36 % (ref 37–47)
HGB UR QL STRIP: 11.4 G/DL (ref 12–16)
LYMPHOCYTES # SPEC AUTO: 1.5 10^3/UL (ref 1.5–3.5)
LYMPHOCYTES NFR BLD AUTO: 12 %
MAGNESIUM SERPL-MCNC: 1.8 MG/DL (ref 1.7–2.3)
MCH RBC QN AUTO: 30.8 PG (ref 27–31)
MCHC RBC AUTO-ENTMCNC: 31.7 G/DL (ref 32–36)
MCV RBC AUTO: 97.3 FL (ref 81–99)
MONOCYTES # BLD AUTO: 0.8 10^3/UL (ref 0–1)
MONOCYTES NFR BLD AUTO: 6.5 %
NEUTROPHILS # BLD AUTO: 9.4 10^3/UL (ref 1.5–6.6)
NEUTROPHILS # SNV AUTO: 12.2 X10^3/UL (ref 4.8–10.8)
NEUTROPHILS NFR BLD AUTO: 77.3 %
NRBC # BLD AUTO: 0 /100WBC
NRBC # BLD AUTO: 0 X10^3/UL
PDW BLD AUTO: 8.5 FL (ref 7.9–10.8)
PHOSPHATE BLD-MCNC: 3.4 MG/DL (ref 2.5–5)
PLATELET # BLD: 260 10^3/UL (ref 130–450)
POTASSIUM SERPL-SCNC: 4.4 MMOL/L (ref 3.5–4.5)
PROT SPEC-MCNC: 6.1 G/DL (ref 6.4–8.9)
RBC MAR: 3.7 10^6/UL (ref 4.2–5.4)
SODIUM SERPLBLD-SCNC: 145 MMOL/L (ref 135–145)

## 2023-08-22 RX ADMIN — DOCUSATE SODIUM SCH: 100 CAPSULE, LIQUID FILLED ORAL at 20:35

## 2023-08-22 RX ADMIN — METOPROLOL TARTRATE SCH MG: 25 TABLET, FILM COATED ORAL at 08:14

## 2023-08-22 RX ADMIN — SODIUM CHLORIDE, PRESERVATIVE FREE SCH ML: 5 INJECTION INTRAVENOUS at 08:15

## 2023-08-22 RX ADMIN — CARBIDOPA AND LEVODOPA SCH TAB: 25; 100 TABLET ORAL at 13:01

## 2023-08-22 RX ADMIN — METOPROLOL TARTRATE SCH MG: 25 TABLET, FILM COATED ORAL at 20:34

## 2023-08-22 RX ADMIN — DILTIAZEM HYDROCHLORIDE SCH EACH: 120 CAPSULE, COATED, EXTENDED RELEASE ORAL at 08:13

## 2023-08-22 RX ADMIN — DILTIAZEM HYDROCHLORIDE SCH EACH: 120 CAPSULE, COATED, EXTENDED RELEASE ORAL at 20:34

## 2023-08-22 RX ADMIN — DOCUSATE SODIUM SCH: 100 CAPSULE, LIQUID FILLED ORAL at 08:14

## 2023-08-22 RX ADMIN — CARBIDOPA AND LEVODOPA SCH TAB: 25; 100 TABLET ORAL at 16:46

## 2023-08-22 RX ADMIN — SODIUM CHLORIDE, PRESERVATIVE FREE SCH ML: 5 INJECTION INTRAVENOUS at 16:46

## 2023-08-22 RX ADMIN — FAMOTIDINE SCH MG: 20 TABLET, FILM COATED ORAL at 08:14

## 2023-08-22 RX ADMIN — CARBIDOPA AND LEVODOPA SCH TAB: 25; 100 TABLET ORAL at 20:34

## 2023-08-22 RX ADMIN — FUROSEMIDE SCH MG: 10 INJECTION INTRAMUSCULAR; INTRAVENOUS at 05:28

## 2023-08-22 RX ADMIN — CITALOPRAM HYDROBROMIDE SCH MG: 10 TABLET ORAL at 20:34

## 2023-08-22 RX ADMIN — ASPIRIN SCH MG: 81 TABLET, CHEWABLE ORAL at 08:13

## 2023-08-22 RX ADMIN — CARBIDOPA AND LEVODOPA SCH TAB: 25; 100 TABLET ORAL at 00:13

## 2023-08-22 RX ADMIN — THERA TABS SCH TAB: TAB at 08:14

## 2023-08-22 RX ADMIN — Medication SCH MG: at 08:13

## 2023-08-22 RX ADMIN — ACETAMINOPHEN PRN MG: 325 TABLET ORAL at 20:34

## 2023-08-22 RX ADMIN — SODIUM CHLORIDE, PRESERVATIVE FREE SCH ML: 5 INJECTION INTRAVENOUS at 00:14

## 2023-08-22 RX ADMIN — CARBIDOPA AND LEVODOPA SCH TAB: 25; 100 TABLET ORAL at 08:14

## 2023-08-22 RX ADMIN — Medication SCH MG: at 16:46

## 2023-08-22 RX ADMIN — ENOXAPARIN SODIUM SCH MG: 100 INJECTION SUBCUTANEOUS at 08:14

## 2023-08-22 RX ADMIN — FUROSEMIDE SCH MG: 20 TABLET ORAL at 13:02

## 2023-08-23 VITALS — DIASTOLIC BLOOD PRESSURE: 63 MMHG | OXYGEN SATURATION: 93 % | SYSTOLIC BLOOD PRESSURE: 131 MMHG

## 2023-08-23 LAB
ALBUMIN DIAFP-MCNC: 3.1 G/DL (ref 3.2–5.5)
ALBUMIN/GLOB SERPL: 1.1 {RATIO} (ref 1–2.2)
ALP SERPL-CCNC: 75 IU/L (ref 42–121)
ALT SERPL W P-5'-P-CCNC: 20 IU/L (ref 10–60)
ANION GAP SERPL CALCULATED.4IONS-SCNC: 4 MMOL/L (ref 6–13)
AST SERPL W P-5'-P-CCNC: 32 IU/L (ref 10–42)
BASOPHILS NFR BLD AUTO: 0.1 10^3/UL (ref 0–0.1)
BASOPHILS NFR BLD AUTO: 0.7 %
BILIRUB BLD-MCNC: 0.3 MG/DL (ref 0.2–1)
BUN SERPL-MCNC: 23 MG/DL (ref 6–20)
CALCIUM UR-MCNC: 8.7 MG/DL (ref 8.5–10.3)
CHLORIDE SERPL-SCNC: 104 MMOL/L (ref 101–111)
CO2 SERPL-SCNC: 32 MMOL/L (ref 21–32)
CREAT SERPLBLD-SCNC: 0.9 MG/DL (ref 0.6–1.3)
EOSINOPHIL # BLD AUTO: 0.4 10^3/UL (ref 0–0.7)
EOSINOPHIL NFR BLD AUTO: 3.4 %
ERYTHROCYTE [DISTWIDTH] IN BLOOD BY AUTOMATED COUNT: 14.1 % (ref 12–15)
GFRSERPLBLD MDRD-ARVRAT: 61 ML/MIN/{1.73_M2} (ref 89–?)
GLOBULIN SER-MCNC: 2.7 G/DL (ref 2.1–4.2)
GLUCOSE SERPL-MCNC: 93 MG/DL (ref 74–104)
HCT VFR BLD AUTO: 34.6 % (ref 37–47)
HGB UR QL STRIP: 10.8 G/DL (ref 12–16)
LYMPHOCYTES # SPEC AUTO: 2.1 10^3/UL (ref 1.5–3.5)
LYMPHOCYTES NFR BLD AUTO: 20.3 %
MAGNESIUM SERPL-MCNC: 1.8 MG/DL (ref 1.7–2.3)
MCH RBC QN AUTO: 30.6 PG (ref 27–31)
MCHC RBC AUTO-ENTMCNC: 31.2 G/DL (ref 32–36)
MCV RBC AUTO: 98 FL (ref 81–99)
MONOCYTES # BLD AUTO: 0.8 10^3/UL (ref 0–1)
MONOCYTES NFR BLD AUTO: 7.6 %
NEUTROPHILS # BLD AUTO: 7.1 10^3/UL (ref 1.5–6.6)
NEUTROPHILS # SNV AUTO: 10.5 X10^3/UL (ref 4.8–10.8)
NEUTROPHILS NFR BLD AUTO: 67.5 %
NRBC # BLD AUTO: 0 /100WBC
NRBC # BLD AUTO: 0 X10^3/UL
PDW BLD AUTO: 8.7 FL (ref 7.9–10.8)
PLATELET # BLD: 242 10^3/UL (ref 130–450)
POTASSIUM SERPL-SCNC: 3.9 MMOL/L (ref 3.5–4.5)
PROT SPEC-MCNC: 5.8 G/DL (ref 6.4–8.9)
RBC MAR: 3.53 10^6/UL (ref 4.2–5.4)
SODIUM SERPLBLD-SCNC: 140 MMOL/L (ref 135–145)

## 2023-08-23 RX ADMIN — ASPIRIN SCH MG: 81 TABLET, CHEWABLE ORAL at 09:13

## 2023-08-23 RX ADMIN — SODIUM CHLORIDE, PRESERVATIVE FREE SCH: 5 INJECTION INTRAVENOUS at 09:15

## 2023-08-23 RX ADMIN — DOCUSATE SODIUM SCH MG: 100 CAPSULE, LIQUID FILLED ORAL at 09:13

## 2023-08-23 RX ADMIN — SODIUM CHLORIDE, PRESERVATIVE FREE SCH ML: 5 INJECTION INTRAVENOUS at 00:14

## 2023-08-23 RX ADMIN — ENOXAPARIN SODIUM SCH MG: 100 INJECTION SUBCUTANEOUS at 08:22

## 2023-08-23 RX ADMIN — METOPROLOL TARTRATE SCH MG: 25 TABLET, FILM COATED ORAL at 09:14

## 2023-08-23 RX ADMIN — CARBIDOPA AND LEVODOPA SCH: 25; 100 TABLET ORAL at 00:13

## 2023-08-23 RX ADMIN — THERA TABS SCH TAB: TAB at 09:13

## 2023-08-23 RX ADMIN — FUROSEMIDE SCH: 20 TABLET ORAL at 07:35

## 2023-08-23 RX ADMIN — DILTIAZEM HYDROCHLORIDE SCH EACH: 120 CAPSULE, COATED, EXTENDED RELEASE ORAL at 09:16

## 2023-08-23 RX ADMIN — CARBIDOPA AND LEVODOPA SCH TAB: 25; 100 TABLET ORAL at 09:15

## 2023-08-23 RX ADMIN — FAMOTIDINE SCH MG: 20 TABLET, FILM COATED ORAL at 09:13

## 2023-08-23 RX ADMIN — CARBIDOPA AND LEVODOPA SCH TAB: 25; 100 TABLET ORAL at 12:23

## 2023-08-23 RX ADMIN — Medication SCH MG: at 09:13

## 2023-08-23 NOTE — DISCHARGE PLAN
Discharge Plan for SNF / [facility]





- Discharge Plan And Transition Orders


Problem Reviewed?: Yes


Disposition: 01 Home, Self Care


Condition: Stable


Allergies and Adverse Reactions: 


                                    Allergies











Allergy/AdvReac Type Severity Reaction Status Date / Time


 


No Known Drug Allergies Allergy   Verified 08/16/23 10:55











Health Concerns: 


You were brought to our emergency room from your facility which is [facility].  You are permanently placed there.  You have a history of 

Parkinson's and dementia and was found to have a heart rate of 150 in the 

emergency room and had a new diagnosis of atrial flutter with rapid ventricular 

response.  We also found to have pneumonia on chest x-ray.  You were transferred

to the ICU to have a diltiazem drip to slow down your heart rate.  We were 

finally able to slow down your heart rate, gave you diuretics to take care of 

the congestive heart failure, and treat your possible pneumonia.  In any case we

felt that you had a urinary tract infection as well we treated you for both the 

UTI and pneumonia.  You are now stable with good rate control.  Because you have

waning and waxing alertness, we did do an MRI of the brain at 11 AM just before 

discharge.  Those results are pending.


Plan of Treatment: 


To return to her permanent placement at a skilled nursing facility.  She is 

minimally ambulatory and as such does not get PT and OT.





Because of her gradual decline due to her disease status, she is requiring more 

and more care.  We strongly recommend that she be hand fed at her skilled 

nursing facility and that fluid intake be monitored to make sure that she is 

getting enough.


Care Goals: 


She is to return to her skilled nursing facility where she lives.  She receives 

complete care for her activities of daily living including hygiene, mobility, 

dressing, and now eating.


Assessment: 


During her stay she demonstrated improved participation during physical therapy 

sessions and went from being mute to communicate in short phrases.  Still 

mumbles.  She is a mod assist x1 for bed mobility and mod assist x2 for sit to 

stand transfers  as well as stand pivot transfers.  She can ambulate 3 feet from

bed to chair.  Goal should remain that she should progress ambulation distance 

with a goal of 20 feet, allowing patient transfer to/from bathroom, and we 

anticipate functional mobility to fluctuate significantly from day-to-day due to

advanced cognitive impairment with varying levels of participation.





She demonstrated good participation with occupational therapy intervention but 

was limited by weakness and cognitive deficits.  OT recommends continuing 

therapy and progress toward goals of self hygiene, self-feeding.





- SNF / correction Transition Orders


Admit to (Facility): [facility]


Under the care of (Name): Davey Virk


Discharge Diagnosis: 


1.  Hypoxia, present on admission resolved


2.  Altered mental status


3.  Atrial flutter with rapid ventricular response, present on admission, 

resolved


4.  Nonsustained V. tach


5.  Flash pulmonary edema due to #3, resolved


6.  Healthcare associated pneumonia, present on admission


7.  Klebsiella UTI


8.  Dementia associated with Parkinson's disease


9.  History of colon cancer





Medicare Certification Statement: 


I certify that [facility] skilled nursing care is medically necessary on a 

continuing basis for any of the conditions for which she/he is receiving care 

during hospitalization.





Notify PCP of admission and forward orders to primary provider for signature.





Weight on admission and: Weekly


Other Notification Orders: 


Call PCP immediately if patient develops dyspnea, chest pain/tightness or edema.





House Bowel Program: Yes


Additional Bowel Program Orders: 


If no BM after 2 days, nurse may give M.O.M. 30ml PO PRN and/or ducolax Supp 1 

IL and/or DEEPAK 250mg P.O., and/or senna 1-2 tabs PO.  On day 3 nurse may give 

repeat above order until residents constipation is resolved. 





Annual Influenza Vaccine (between Sept 1st and March 31st): Yes


Two-step PPD per [facility] [phone] or approved exception documents: Yes


Medication Orders: 





PLEASE REFER TO THE DISCHARGE MEDICATION LIST.











- Diet


Type: Geriatric


Texture: Puree


Liquids: Thin


May have monthly special meal: Yes





- Therapies | Activity


Therapy: Evaluation | Treat if indicated: Speech, OT


Rehabilitation Potential: Maximize functional status


Activity: Activity as Tolerated


Assistance Devices: Walker

## 2023-08-23 NOTE — MRI REPORT
PROCEDURE:  BRAIN WO

 

INDICATIONS:  ams

 

TECHNIQUE:  

Noncontrast axial T1 spin echo, axial T2 fast spin echo, sagittal and axial FLAIR, coronal T2 fast sp
in echo, axial gradient echo, axial diffusion and ADC through the brain.  

 

COMPARISON:  None.

 

FINDINGS:  

Image quality:  Excellent.  

 

CSF Spaces:  Basal cisterns are patent.  No extra-axial fluid collections.  Ventricles are normal in 
size and shape.  

 

Brain:  No intracranial masses or hemorrhage.  Gray/white matter interface is normal.  Brainstem appe
ars normal.  Diffusion-weighted images demonstrate no acute ischemic insult.  There are T2/FLAIR hype
rintensities within the deep and periventricular white matter, nonspecific and likely representing ch
ronic microvascular ischemic change. Age-related global volume loss. No chronic ischemic insults.  No
rmal intravascular flow voids are present.  

 

Skull and face:  Calvarium has normal marrow signal.  Bilateral lens replacements. The orbits are oth
erwise normal in appearance. Sinuses:  Sinuses and mastoids are clear.  

 

IMPRESSION:  

1.No cause for patient's symptoms is identified.

2.No acute intracranial abnormalities.

3.Age-related volume loss and chronic microvascular ischemic change.

 

Reviewed by: Kalyan Chacon MD on 8/23/2023 2:43 PM PDT

Approved by: Kalyan Chacon MD on 8/23/2023 2:43 PM PDT

 

 

Station ID:  [id number]

## 2023-08-23 NOTE — CT REPORT
PROCEDURE:  HEAD WO

 

INDICATIONS:  AMS

 

TECHNIQUE:  

Noncontrast 4.5 mm thick angled axial sections acquired from the foramen magnum to the vertex.  For r
adiation dose reduction, the following was used:  automated exposure control, adjustment of mA and/or
 kV according to patient size.

 

COMPARISON:  CT head 10/26/2022.

 

FINDINGS:  

Image quality:  Excellent.  

 

CSF spaces:  Basal cisterns are patent.  No extra-axial fluid collections.  Ventricles are symmetric 
in size and shape.  

 

Brain:  No midline shift.  No intracranial masses or hemorrhage. Hypodensities in the subcortical and
 periventricular white matter are most commonly seen in setting of chronic microvascular ischemic galileo
nges. Age-related cerebral and cerebellar volume loss is seen. Intracranial vascular calcifications a
re noted in the internal carotid arteries.

 

Skull and face:  Calvarium and visualized facial bones are intact, without suspicious lesions.  

 

Sinuses:  Visualized sinuses and mastoids are clear.  

 

IMPRESSION:  

No acute intracranial abnormality. Stable chronic findings.

 

 

 

Reviewed by: Nico Pressley MD on 8/23/2023 2:02 AM PDT

Approved by: Nico Pressley MD on 8/23/2023 2:02 AM PDT

 

 

Station ID:  [id number]

## 2023-08-23 NOTE — PROVIDER PROGRESS NOTE
Assessment/Plan





- Problem List


(1) Atrial flutter with rapid ventricular response


Assessment/Plan: 


The finding of a pneumonia could be the reason the patient went into new atrial 

flutter.


A diltiazem drip was started for rate control, and the patient was admitted to 

the ICU. Upon presentation to the ICU the patient converted spontaneously to NSR

at a rate of 80. She then has had short runs of SVT.  Her diltiazem drip was 

titrated to off as I started her on BID Metoprolol 


There is mention of Parkinson's with frequent falls. Therefore I did not start 

anticoagulation and continued the 1 baby aspirin daily, which she is on.


A set of troponins ruled out ACS as the cause of the new a flutter


Her TSH came back WNL at 1.38 (all labs were reviewed)


She was transfered her out of the the ICU yesterday, if she does not have 

recurrence of sustained Aflutter with RVR on her crushed meds


Plan:


I have increased her B-blocker from daily to twice daily in order to suppress 

the PSVT's she is still getting. She would not swallow, meds need to be crushed.

 I had to change her Toprol-XL to Metoprolol Tartrate so it can be crushed





(2) Pulmonary edema


Conclusion/Plan: 


Her chest x-ray was read as having a probable infiltrate and CHF. She probably 

went into flash pulmonary edema because of sudden rapid HR.


Her periphery however was dehydrated with skin tenting.


Plan:


Because the patient did not have hypoxia at admission, I did not start her on 

Lasix.  I wanted to let the fluids equilibrate.


Follow her BNP intermittently


Awaiting her Echo result, it was not done when Echo Tech was here, for unknown 

reason, and now no scheduled Echo Tech here for 3 days


Qualifiers: 


   Chronicity: acute   Qualified Code(s): J81.0 - Acute pulmonary edema   





(3) VTach


Conclusion/Plan: 


Patient was having monomorphic V. tach runs, which were asymptomatic (telem was 

reviewed).


Plan:


Assure that her magnesium, potassium and other electrolytes are normal, replace 

if low


Awaiting her Echo result


I have increased her daily Toprol to twice daily both for suppressing SVT/atrial

flutter and it will help the VTach as well.





(4) HCAP (healthcare-associated pneumonia)


Conclusion/Plan: 


The patient still does not have a cough but this could be because she was 

intravascularly depleted. WBC was elevated. CXR shows an infiltrate. The patient

lives in a nursing home and therefore we are treating a healthcare associated 

pneumonia.


Blood cultures were done and a sputum culture, these are neg to date


Plan:


Cont supplemental O2 to keep O2 sats 92% or above


Cont empiric IV cefepime and IV Vanco


Await cx results to tailor antibx





(4) UTI


Conclusion/Plan: 


Her UA was not available until a catheterized specimen was obtained. The urine 

analysis was abnormal with many WBC and many bacteria.


The urine culture is already growing a gram-negative diogo


Plan:


Her empiric IV cefepime covers her gram-negative UTI


Await for identification and sensitivities to tailor antibiotics





(5) Dementia associated with Parkinson's disease


Conclusion/Plan: 


As per history.  Patient has no tremor however she is minimally communicative 

which may be her flat affect.


I reviewed her EMR.  In the [facility] note there is mention of Parkinson's 

with frequent falls.


Today more information was obtained from her RN speaking to the , who 

said the patient "can stand up but always ends up falling down".


Plan:


Cont the resumed Parkinson's meds 


Cont PT and OT 





(6) Colon cancer


Conclusion/Plan: 


I reviewed her EMR.  The patient is followed in oncology [facility] care.  She 

has colon cancer.





(7) AMS (altered mental status)


Conclusion/Plan: 


IMPROVED


She carries a diagnosis of dementia and Parkinson's  Patient is mostly nonverbal

here, she is in no distress.  She is able to follow directions, was able to get 

up and walk from bed to chair using a walker, leaning forward.  Then she mostly 

was sleeping all day sitting upright in her chair


Plan:


Cont her Parkinson's meds 


Continue to treat her UTI, HCAP and hypoxia

















- Current Meds


Current Meds: 





                               Current Medications











Generic Name Dose Route Start Last Admin





  Trade Name Freq  PRN Reason Stop Dose Admin


 


Acetaminophen  650 mg  08/16/23 13:15  08/17/23 20:50





  Acetaminophen 325 Mg Tablet  PO   650 mg





  Q4HR PRN   Administration





  Pain 1 to 4, or Fever  


 


Aspirin  81 mg  08/17/23 09:00  08/19/23 10:07





  Aspirin Chew 81 Mg Tablet  PO   81 mg





  DAILY AGUSTINA   Administration


 


Carbidopa/Levodopa  2 tab  08/16/23 17:00  08/19/23 13:08





  Carbidopa/Levodopa 25 Mg/100 Mg Tablet  PO   2 tab





  QID AGUSTINA   Administration


 


Carbidopa/Levodopa  2 tab  08/18/23 23:00  08/18/23 22:55





  Carbidopa/Levodopa 25 Mg/100 Mg Tablet  PO   2 tab





  2300 AGUSTINA   Administration


 


Citalopram Hydrobromide  10 mg  08/16/23 21:00  08/18/23 20:26





  Citalopram 10 Mg Tablet  PO   10 mg





  QPM AGUSTINA   Administration


 


Docusate Sodium  200 mg  08/16/23 21:00  08/19/23 10:08





  Docusate Sodium 100 Mg Capsule  PO   200 mg





  BID AGUSTINA   Administration


 


Enoxaparin Sodium  40 mg  08/17/23 09:00  08/19/23 09:37





  Enoxaparin 40 Mg/0.4 Ml Syringe  SUBQ   40 mg





  DAILY AGUSTINA   Administration


 


Famotidine  20 mg  08/17/23 09:00  08/19/23 10:08





  Famotidine 20 Mg Tablet  PO   20 mg





  DAILY AGUSTINA   Administration


 


Ceftriaxone Sodium 1 gm/  100 mls @ 200 mls/hr  08/18/23 11:00  08/19/23 10:18





  Sodium Chloride  IV   Infused





  DAILY AGUSTINA   Infusion


 


Metoprolol Tartrate  25 mg  08/18/23 21:00  08/19/23 09:51





  Metoprolol Tartrate 25 Mg Tablet  PO   25 mg





  BID AGUSTINA   Administration


 


Multivitamins  1 tab  08/17/23 12:00  08/19/23 10:08





  Multivitamin Tablet  PO   1 tab





  DAILYWM AGUSTINA   Administration


 


**Rivastigmine  1 each  08/16/23 21:00  08/19/23 09:51





Tartrate [  PO   1 each





Rivastigmine] 3 Mg  BID AGUSTINA   Administration





Capsule**   


 


Polyethylene Glycol  17 gm  08/16/23 16:28  08/19/23 10:09





  Polyethylene Glycol 3350 17 Gm Packet  PO   17 gm





  DAILY AGUSTINA   Administration


 


Quetiapine Fumarate  25 mg  08/18/23 21:00  08/19/23 10:08





  Quetiapine 25 Mg Tablet  PO   25 mg





  BID AGUSTINA   Administration


 


Saccharomyces Boulardii  250 mg  08/17/23 08:00  08/19/23 10:06





  Saccharomyces Boulardii 250 Mg Capsule  PO   250 mg





  BIDWM AGUSTINA   Administration


 


Sodium Chloride  10 ml  08/16/23 17:00  08/19/23 10:08





  Sodium Chloride Flush 0.9% 10 Ml Syringe  IVP   10 ml





  0100,0900,1700 AGUSTINA   Administration


 


Sodium Chloride  10 ml  08/16/23 13:15  08/17/23 21:07





  Sodium Chloride Flush 0.9% 10 Ml Syringe  IVP   10 ml





  PRN PRN   Administration





  AS NEEDED PER PROVIDER ORDERS  














- Lab Result


Fish Bone Diagrams: 


                                 08/19/23 04:29





                                 08/19/23 04:29





- Additional Planning


My Orders: 





My Active Orders





08/18/23 16:48


Shower [RC] PRN 





08/18/23 18:41


Telemetry- [RC] Q4HR 





08/18/23 21:00


Metoprolol Tartrate [Lopressor]   25 mg PO BID 


QUEtiapine [SEROquel]   25 mg PO BID 





08/18/23 23:00


Carbidopa/Levodopa 25/100 [Sinemet 25 mg/100 mg]   2 tab PO 2300 





08/19/23 03:24


Min Oil/Dimeth/Coconut Oil Crm [Cavilon]   1 applic TOP PRN PRN 





08/20/23 05:00


BMP - BASIC METABOLIC PANEL [CHEM] DAILYLAB 














Subjective





- Subjective


Patient Reports: Other (She is sittting in a chair watching TV, alert, but 

minimally communicative)





Objective


Vital Signs: 





                               Vital Signs - 24 hr











  08/18/23 08/18/23 08/18/23





  17:00 20:26 22:52


 


Temperature 36.1 C L  36.3 C L


 


Heart Rate [   73





Brachial]   


 


Heart Rate [ 87  





Monitoring   





electrodes]   


 


Respiratory 20  16





Rate   


 


Blood Pressure  126/65 


 


Blood Pressure 107/79  





[Left Brachial   





artery]   


 


Blood Pressure   114/64





[Right Brachial   





artery]   


 


O2 Saturation 94  95


 


If not protocol 1  1





: Oxygen Flow,   





liters/minute   














  08/19/23 08/19/23 08/19/23





  01:00 03:30 05:00


 


Temperature 36.2 C L  36.6 C


 


Heart Rate [ 76 71 63





Brachial]   


 


Heart Rate [   





Monitoring   





electrodes]   


 


Respiratory 18 18 14





Rate   


 


Blood Pressure   


 


Blood Pressure   





[Left Brachial   





artery]   


 


Blood Pressure 107/59 L 114/66 120/56 L





[Right Brachial   





artery]   


 


O2 Saturation 92 93 92


 


If not protocol 2 2 





: Oxygen Flow,   





liters/minute   














  08/19/23 08/19/23 08/19/23





  08:05 09:51 11:52


 


Temperature 36.6 C  36.3 C L


 


Heart Rate [ 78  73





Brachial]   


 


Heart Rate [   





Monitoring   





electrodes]   


 


Respiratory 16  





Rate   


 


Blood Pressure  127/67 


 


Blood Pressure   





[Left Brachial   





artery]   


 


Blood Pressure 119/49 L  106/61





[Right Brachial   





artery]   


 


O2 Saturation 90 L  93


 


If not protocol 2  2





: Oxygen Flow,   





liters/minute   








                                     Oxygen











O2 Source [With Activity]      Nasal cannula


 


O2 Source [Without Activity]   Nasal cannula


 


O2 Source                      Nasal cannula














I&O (Last 24 Hrs): 





                          Intake and Output Totals x24h











 08/17/23 08/18/23 08/19/23





 23:59 23:59 23:59


 


Intake Total 1643.25 530 250


 


Output Total 150 500 200


 


Balance 1493.25 30 50











General: Alert, No acute distress


HEENT: EOMI


Neck: Supple, No JVD


Neuro: Alert, Disoriented, Non Focal, Other (Minimally commuicative, follows 

cues)


Cardiovascular: Regular rate


Respiratory: No respiratory distress


Abdomen: No tenderness


Extremities: No edema, No tenderness/swelling





- Results


Results: 





                               Laboratory Results











WBC  8.0 x10^3/uL (4.8-10.8)   08/19/23  04:29    


 


RBC  3.22 10^6/uL (4.20-5.40)  L  08/19/23  04:29    


 


Hgb  9.9 g/dL (12.0-16.0)  L  08/19/23  04:29    


 


Hct  31.3 % (37.0-47.0)  L  08/19/23  04:29    


 


MCV  97.2 fL (81.0-99.0)   08/19/23  04:29    


 


MCH  30.7 pg (27.0-31.0)   08/19/23  04:29    


 


MCHC  31.6 g/dL (32.0-36.0)  L  08/19/23  04:29    


 


RDW  14.1 % (12.0-15.0)   08/19/23  04:29    


 


Plt Count  242 10^3/uL (130-450)   08/19/23  04:29    


 


MPV  8.6 fL (7.9-10.8)   08/19/23  04:29    


 


Neut # (Auto)  5.7 10^3/uL (1.5-6.6)   08/19/23  04:29    


 


Lymph # (Auto)  1.2 10^3/uL (1.5-3.5)  L  08/19/23  04:29    


 


Mono # (Auto)  0.6 10^3/uL (0.0-1.0)   08/19/23  04:29    


 


Eos # (Auto)  0.3 10^3/uL (0.0-0.7)   08/19/23  04:29    


 


Baso # (Auto)  0.0 10^3/uL (0.0-0.1)   08/19/23  04:29    


 


Absolute Nucleated RBC  0.00 x10^3/uL  08/19/23  04:29    


 


Nucleated RBC %  0.0 /100WBC  08/19/23  04:29    


 


VBG pH  7.420  (7.31-7.41)  H  08/19/23  04:29    


 


Ionized Calcium  1.12 mmol/L (1.15-1.33)  L  08/19/23  04:29    


 


Sodium  140 mmol/L (135-145)   08/19/23  04:29    


 


Potassium  4.0 mmol/L (3.5-4.5)   08/19/23  04:29    


 


Chloride  110 mmol/L (101-111)   08/19/23  04:29    


 


Carbon Dioxide  26 mmol/L (21-32)   08/19/23  04:29    


 


Anion Gap  4.0  (6-13)  L  08/19/23  04:29    


 


BUN  19 mg/dL (6-20)   08/19/23  04:29    


 


Creatinine  0.8 mg/dL (0.6-1.3)   08/19/23  04:29    


 


Estimated GFR (MDRD)  69  (>89)  L  08/19/23  04:29    


 


Glucose  97 mg/dL ()   08/19/23  04:29    


 


Calcium  8.5 mg/dL (8.5-10.3)   08/19/23  04:29    


 


Phosphorus  3.1 mg/dL (2.5-5.0)   08/19/23  04:29    


 


Magnesium  2.0 mg/dL (1.7-2.8)   08/17/23  04:57    


 


Total Bilirubin  0.7 mg/dL (0.2-1.0)   08/17/23  04:57    


 


AST  55 IU/L (10-42)  H  08/17/23  04:57    


 


ALT  17 IU/L (10-60)   08/17/23  04:57    


 


Alkaline Phosphatase  66 IU/L ()   08/17/23  04:57    


 


Troponin I High Sens  538.8 ng/L (2.3-14.8)  H*  08/16/23  14:22    


 


B-Natriuretic Peptide  823 pg/mL (5-100)  H  08/16/23  11:05    


 


Total Protein  6.1 g/dL (6.7-8.2)  L  08/17/23  04:57    


 


Albumin  3.1 g/dL (3.2-5.5)  L  08/17/23  04:57    


 


Globulin  3.0 g/dL (2.1-4.2)   08/17/23  04:57    


 


Albumin/Globulin Ratio  1.0  (1.0-2.2)   08/17/23  04:57    


 


Lipase  24 U/L (22-51)   08/16/23  11:05    


 


Vitamin B12  654 pg/mL (180-914)   08/17/23  04:57    


 


Folate  16.0 ng/mL (5.90 - >24.8)   08/17/23  04:57    


 


TSH  1.38 uIU/mL (0.34-5.60)   08/18/23  04:51    


 


Urine Color  YELLOW   08/16/23  14:35    


 


Urine Clarity  HAZY  (CLEAR)   08/16/23  14:35    


 


Urine pH  5.5 PH (5.0-7.5)   08/16/23  14:35    


 


Ur Specific Gravity  1.020  (1.002-1.030)   08/16/23  14:35    


 


Urine Protein  TRACE mg/dL (NEGATIVE)   08/16/23  14:35    


 


Urine Glucose (UA)  NEGATIVE mg/dL (NEGATIVE)   08/16/23  14:35    


 


Urine Ketones  NEGATIVE mg/dL (NEGATIVE)   08/16/23  14:35    


 


Urine Occult Blood  NEGATIVE  (NEGATIVE)   08/16/23  14:35    


 


Urine Nitrite  POSITIVE  (NEGATIVE)  H  08/16/23  14:35    


 


Urine Bilirubin  NEGATIVE  (NEGATIVE)   08/16/23  14:35    


 


Urine Urobilinogen  1 (NORMAL) E.U./dL (NORMAL)   08/16/23  14:35    


 


Ur Leukocyte Esterase  TRACE  (NEGATIVE)  H  08/16/23  14:35    


 


Urine RBC  0-5 /HPF (0-5)   08/16/23  14:35    


 


Urine WBC  6-10 /HPF (0-5)  H  08/16/23  14:35    


 


Ur Squamous Epith Cells  FEW Squamous  (<= Few)   08/16/23  14:35    


 


Urine Bacteria  Moderate /HPF (None Seen)  H  08/16/23  14:35    


 


Ur Microscopic Review  INDICATED   08/16/23  14:35    


 


Urine Culture Comments  INDICATED   08/16/23  14:35    


 


Nasal Screen MRSA (PCR)  NEGATIVE  (NEGATIVE)   08/16/23  14:05    














- Procedures


Procedures: 





Procedures





EXCISION OF DESCENDING COLON, ENDO, DIAGN (04/15/19)


EXCISION OF DESCENDING COLON, PERC ENDO APPROACH (04/23/19)


EXCISION OF ILEOCECAL VALVE, ENDO (04/15/19)


EXCISION OF PELVIS LYMPHATIC, PERC ENDO APPROACH, DIAGN (04/23/19)


EXCISION OF TRANSVERSE COLON, PERC ENDO APPROACH (04/23/19)


INTRODUCTION OF OTH THERAP SUBST INTO LOW GI, ENDO (04/15/19)


PARTIAL HIP REPLACEMENT (03/16/15)


REPAIR ABDOMINAL WALL, PERCUTANEOUS ENDOSCOPIC APPROACH (04/23/19)


RESECTION OF BILATERAL FALLOPIAN TUBES, PERC ENDO APPROACH (04/23/19)


RESECTION OF BILATERAL OVARIES, PERC ENDO APPROACH (04/23/19)
Assessment/Plan





- Problem List


(1) Hypoxia


Assessment/Plan: 


At admission, the patient did not have hypoxia, it developed later. Her CXR was 

read as infiltrate and CHF.  I did not start her on Lasix since she was 

dehydrated overall, and  I wanted to let the fluids equilibrate.


After 2 days, she needed suppl O2 at 2L>> 1L


Since she is still requiring supplemental O2 today, I rechecked a chest x-ray 

which continued to show CHF and possible infiltrate and tiny effusions


Plan:


I started Lasix IV twice daily for 4 doses


Target O2 sats are 92% and above, give supplemental O2 if needed





(2) Pulmonary edema


Conclusion/Plan: 


Her initial CXR was read as having a probable infiltrate and CHF. She probably 

went into flash pulmonary edema because of sudden rapid HR.


Her periphery however was dehydrated with skin tenting at admission.


Because the patient did not have hypoxia at admission, I did not start her on 

Lasix at admission.  I wanted to let the fluids equilibrate.


After 2 days, she required supplemental O2 and is still on 1-2L. Last chest x-

ray continued to show CHF and possible infiltrate and tiny effusions


Plan:


I ordered Lasix IV twice daily for 4 doses


I am awaiting her Echo result, to tailor treatment (the Echo was not done when 

Echo Tech was here last week, for unknown reason, and then we have no scheduled 

Echo Tech here for several days until tomorrow, Tues)


Qualifiers: 


   Chronicity: acute   Qualified Code(s): J81.0 - Acute pulmonary edema   





(3) HCAP (healthcare-associated pneumonia)


Conclusion/Plan: 


The patient did not have a cough but this could have been because she was 

intravascularly depleted. WBC was elevated. CXR showed an infiltrate. The pauline qureshi lives in a nursing home and therefore we started treating a healthcare 

associated pneumonia.


Blood cultures were done and a sputum culture, these are neg to date


Plan:


She got empiric Cefepime and Vanco, then was on Ceftriaxone, total of 5 days w/ 

iv antibx





(4) UTI due to Klebsiella - N39.0


Conclusion/Plan: 


Her UA was not available until a catheterized specimen was obtained. The U/A was

abnormal and she was already on Cefepime for the HCAP.


On 8/20 the results returned showing 2 bacteria growing in the urine.  Urine cx 

has Klebsiella pneumoniae and Aerococcus urinae. She was first on empiric 

cefepime then de-escalated to empiric ceftriaxone IV


Per sensitivities are available now (which I reviewed). Her empiric IV cefepime 

and IV Ceftriaxone covered the Klebsiella pneumoniae, and the Aerococcus urinae


Plan:


I reviewed her sensitivities with our pharmacist Bebeto. In order to complete a 7

day course of treatment for the UTI, I will not change her to po Cipro which 

does not cover the Aerococcus, nor to po Amoxicillin or Nitrofurantoin, which 

does not cover the Klebsiella.  I stopped her IV ceftriaxone on 88/20 and 

started Keflex 500 mg p.o. every 6 hours.  She needs today then 1 more days of 

treatment with this Keflex





(5) Dementia associated with Parkinson's disease


Conclusion/Plan: 


As per history.  Patient has no tremor however she is minimally communicative 

which may be her flat affect.


I reviewed her EMR.  In the [facility] note there is mention of Parkinson's 

with frequent falls.


Then more information was obtained from her RN speaking to the , who said

the patient "can stand up but always ends up falling down".


Plan:


Cont the resumed Parkinson's meds 


Cont PT and OT 


She needs a shower, will order





(6) Colon cancer


Conclusion/Plan: 


I reviewed her EMR.  The patient is followed in oncology [facility] care.  She 

had colon cancer.





(7) AMS (altered mental status)


Conclusion/Plan: 


IMPROVED


She carries a diagnosis of dementia and Parkinson's  Patient is mostly nonverbal

here, she is in no distress.  She is able to follow directions, was able to get 

up and walk from bed to chair using a walker, leaning forward.  Then she mostly 

was sleeping all day sitting upright in her chair


Plan:


Cont her Parkinson's meds 


Continue to treat her UTI, HCAP and hypoxia


She needs a shower, will order





(8) Atrial flutter with rapid ventricular response


Assessment/Plan: 


RESOLVED


Aflutter w/ RVR was the reason she was admitted, when her heart rate was found 

to be 150 at the nursing home. The finding of a pneumonia could be the reason 

the patient went into new atrial flutter.


She required a diltiazem drip for rate control, and was admitted to the ICU. 

Upon presentation to the ICU the patient converted spontaneously to NSR at a 

rate of 80. Her B-blocker was increased and the diltiazem drip was titrated to 

off. She then has had short runs of SVT.  


Her EMR described frequent falls. Therefore I did not start anticoagulation and 

continued the 1 baby aspirin daily, which she was on.


A set of troponins ruled out ACS as the cause of the new a flutter


Her TSH came back WNL at 1.38 (all labs were reviewed)


Plan:


Cont the increased B-blocker, changed from daily to twice daily, in order to 

suppress the PSVT's and VT. She occasionally does not swallow, so meds need to 

be crushed. Therefore, I had to change her Toprol-XL to Metoprolol Tartrate so 

it can be crushed





(9) VTach


Conclusion/Plan: 


RESOLVED


Patient was having monomorphic V. tach runs, which were asymptomatic (telem was 

reviewed).


Assured that her magnesium, potassium and other electrolytes are normal, replace

if low


Plan:


I am awaiting her Echo result to tailor treatment


Cont the increased daily Metoprolol to twice daily both for suppressing 

SVT/atrial flutter and it will help the VTach as well.








- Current Meds


Current Meds: 





                               Current Medications











Generic Name Dose Route Start Last Admin





  Trade Name Freq  PRN Reason Stop Dose Admin


 


Acetaminophen  650 mg  08/16/23 13:15  08/21/23 10:45





  Acetaminophen 325 Mg Tablet  PO   650 mg





  Q4HR PRN   Administration





  Pain 1 to 4, or Fever  


 


Aspirin  81 mg  08/17/23 09:00  08/21/23 08:30





  Aspirin Chew 81 Mg Tablet  PO   81 mg





  DAILY AGUSTINA   Administration


 


Carbidopa/Levodopa  2 tab  08/16/23 17:00  08/21/23 13:00





  Carbidopa/Levodopa 25 Mg/100 Mg Tablet  PO   2 tab





  QID AGUSTINA   Administration


 


Carbidopa/Levodopa  2 tab  08/18/23 23:00  08/20/23 23:41





  Carbidopa/Levodopa 25 Mg/100 Mg Tablet  PO   2 tab





  2300 AGUSTINA   Administration


 


Cephalexin  500 mg  08/20/23 12:00  08/21/23 13:01





  Cephalexin 250 Mg Capsule  PO   500 mg





  Q6HR AGUSTINA   Administration


 


Citalopram Hydrobromide  10 mg  08/16/23 21:00  08/20/23 20:58





  Citalopram 10 Mg Tablet  PO   10 mg





  QPM AGUSTINA   Administration


 


Docusate Sodium  200 mg  08/16/23 21:00  08/21/23 08:29





  Docusate Sodium 100 Mg Capsule  PO   200 mg





  BID AGUSTINA   Administration


 


Enoxaparin Sodium  40 mg  08/17/23 09:00  08/21/23 08:40





  Enoxaparin 40 Mg/0.4 Ml Syringe  SUBQ   40 mg





  DAILY AGUSTINA   Administration


 


Famotidine  20 mg  08/17/23 09:00  08/21/23 08:31





  Famotidine 20 Mg Tablet  PO   20 mg





  DAILY AGUSTINA   Administration


 


Furosemide  20 mg  08/20/23 08:12  08/21/23 13:54





  Furosemide 20 Mg/2 Ml Vial  IVP   20 mg





  BIDDIURETIC AGUSTINA   Administration


 


Metoprolol Tartrate  25 mg  08/18/23 21:00  08/21/23 08:31





  Metoprolol Tartrate 25 Mg Tablet  PO   25 mg





  BID AGUSTINA   Administration


 


Multivitamins  1 tab  08/17/23 12:00  08/21/23 08:31





  Multivitamin Tablet  PO   1 tab





  DAILYWM AGUSTINA   Administration


 


**Rivastigmine  1 each  08/16/23 21:00  08/21/23 08:29





Tartrate [  PO   1 each





Rivastigmine] 3 Mg  BID AGUSTINA   Administration





Capsule**   


 


Polyethylene Glycol  17 gm  08/16/23 16:28  08/21/23 08:29





  Polyethylene Glycol 3350 17 Gm Packet  PO   17 gm





  DAILY AGUSTINA   Administration


 


Quetiapine Fumarate  25 mg  08/18/23 21:00  08/21/23 08:32





  Quetiapine 25 Mg Tablet  PO   25 mg





  BID AGUSTINA   Administration


 


Saccharomyces Boulardii  250 mg  08/17/23 08:00  08/21/23 08:32





  Saccharomyces Boulardii 250 Mg Capsule  PO   250 mg





  BIDWM AGUSTINA   Administration


 


Sodium Chloride  10 ml  08/16/23 17:00  08/21/23 08:41





  Sodium Chloride Flush 0.9% 10 Ml Syringe  IVP   10 ml





  0100,0900,1700 AGUSTINA   Administration


 


Sodium Chloride  10 ml  08/16/23 13:15  08/20/23 13:43





  Sodium Chloride Flush 0.9% 10 Ml Syringe  IVP   10 ml





  PRN PRN   Administration





  AS NEEDED PER PROVIDER ORDERS  














- Lab Result


Fish Bone Diagrams: 


                                 08/19/23 04:29





                                 08/20/23 04:37





Subjective





- Subjective


Patient Reports: No Complaints


Nursing Reports: Other (Mostly naps, sitting in a recliner chair, she does 

awaken speaks slowly, swallows, needs to be fed)





Objective


Vital Signs: 





                               Vital Signs - 24 hr











  08/20/23 08/20/23 08/20/23





  20:58 21:00 23:47


 


Temperature  37.0 C 36.8 C


 


Heart Rate [  93 77





Brachial]   


 


Respiratory  16 24





Rate   


 


Blood Pressure 124/51 L  


 


Blood Pressure  124/51 L 104/49 L





[Right Brachial   





artery]   


 


O2 Saturation  92 95


 


If not protocol   1





: Oxygen Flow,   





liters/minute   














  08/21/23 08/21/23 08/21/23





  05:57 07:51 08:01


 


Temperature 36.4 C L 36.9 C 


 


Heart Rate [ 73 88 





Brachial]   


 


Respiratory 16 20 





Rate   


 


Blood Pressure   


 


Blood Pressure 126/65 113/65 





[Right Brachial   





artery]   


 


O2 Saturation 91 L 86 L 95


 


If not protocol 1 1 1.5





: Oxygen Flow,   





liters/minute   














  08/21/23 08/21/23





  08:31 12:05


 


Temperature  36.4 C L


 


Heart Rate [  79





Brachial]  


 


Respiratory  16





Rate  


 


Blood Pressure 113/65 


 


Blood Pressure  111/48 L





[Right Brachial  





artery]  


 


O2 Saturation  95


 


If not protocol  1





: Oxygen Flow,  





liters/minute  








                                     Oxygen











O2 Source [With Activity]      Nasal cannula


 


O2 Source [Without Activity]   Nasal cannula


 


O2 Source                      Nasal cannula














I&O (Last 24 Hrs): 





                          Intake and Output Totals x24h











 08/19/23 08/20/23 08/21/23





 23:59 23:59 23:59


 


Intake Total 350 460 200


 


Output Total 450 1900 1275


 


Balance -100 -1440 -1075











General: Other (Asleep)


HEENT: Mucous membr. moist/pink, Other (wearing O2 per n.c.)


Neck: Supple


Neuro: Other (Somnolent, moves all extrem spont)


Cardiovascular: No murmurs


Respiratory: No respiratory distress


Abdomen: Soft


Extremities: No clubbing, No edema, No tenderness/swelling





- Results


Results: 





                               Laboratory Results











WBC  8.0 x10^3/uL (4.8-10.8)   08/19/23  04:29    


 


RBC  3.22 10^6/uL (4.20-5.40)  L  08/19/23  04:29    


 


Hgb  9.9 g/dL (12.0-16.0)  L  08/19/23  04:29    


 


Hct  31.3 % (37.0-47.0)  L  08/19/23  04:29    


 


MCV  97.2 fL (81.0-99.0)   08/19/23  04:29    


 


MCH  30.7 pg (27.0-31.0)   08/19/23  04:29    


 


MCHC  31.6 g/dL (32.0-36.0)  L  08/19/23  04:29    


 


RDW  14.1 % (12.0-15.0)   08/19/23  04:29    


 


Plt Count  242 10^3/uL (130-450)   08/19/23  04:29    


 


MPV  8.6 fL (7.9-10.8)   08/19/23  04:29    


 


Neut # (Auto)  5.7 10^3/uL (1.5-6.6)   08/19/23  04:29    


 


Lymph # (Auto)  1.2 10^3/uL (1.5-3.5)  L  08/19/23  04:29    


 


Mono # (Auto)  0.6 10^3/uL (0.0-1.0)   08/19/23  04:29    


 


Eos # (Auto)  0.3 10^3/uL (0.0-0.7)   08/19/23  04:29    


 


Baso # (Auto)  0.0 10^3/uL (0.0-0.1)   08/19/23  04:29    


 


Absolute Nucleated RBC  0.00 x10^3/uL  08/19/23  04:29    


 


Nucleated RBC %  0.0 /100WBC  08/19/23  04:29    


 


VBG pH  7.420  (7.31-7.41)  H  08/19/23  04:29    


 


Ionized Calcium  1.12 mmol/L (1.15-1.33)  L  08/19/23  04:29    


 


Sodium  141 mmol/L (135-145)   08/20/23  04:37    


 


Potassium  4.2 mmol/L (3.5-4.5)   08/20/23  04:37    


 


Chloride  111 mmol/L (101-111)   08/20/23  04:37    


 


Carbon Dioxide  25 mmol/L (21-32)   08/20/23  04:37    


 


Anion Gap  5.0  (6-13)  L  08/20/23  04:37    


 


BUN  17 mg/dL (6-20)   08/20/23  04:37    


 


Creatinine  0.7 mg/dL (0.6-1.3)   08/20/23  04:37    


 


Estimated GFR (MDRD)  81  (>89)  L  08/20/23  04:37    


 


Glucose  99 mg/dL ()   08/20/23  04:37    


 


Calcium  8.5 mg/dL (8.5-10.3)   08/20/23  04:37    


 


Phosphorus  3.1 mg/dL (2.5-5.0)   08/19/23  04:29    


 


Magnesium  2.0 mg/dL (1.7-2.8)   08/17/23  04:57    


 


Total Bilirubin  0.7 mg/dL (0.2-1.0)   08/17/23  04:57    


 


AST  55 IU/L (10-42)  H  08/17/23  04:57    


 


ALT  17 IU/L (10-60)   08/17/23  04:57    


 


Alkaline Phosphatase  66 IU/L ()   08/17/23  04:57    


 


Troponin I High Sens  538.8 ng/L (2.3-14.8)  H*  08/16/23  14:22    


 


B-Natriuretic Peptide  823 pg/mL (5-100)  H  08/16/23  11:05    


 


Total Protein  6.1 g/dL (6.7-8.2)  L  08/17/23  04:57    


 


Albumin  3.1 g/dL (3.2-5.5)  L  08/17/23  04:57    


 


Globulin  3.0 g/dL (2.1-4.2)   08/17/23  04:57    


 


Albumin/Globulin Ratio  1.0  (1.0-2.2)   08/17/23  04:57    


 


Lipase  24 U/L (22-51)   08/16/23  11:05    


 


Vitamin B12  654 pg/mL (180-914)   08/17/23  04:57    


 


Folate  16.0 ng/mL (5.90 - >24.8)   08/17/23  04:57    


 


TSH  1.38 uIU/mL (0.34-5.60)   08/18/23  04:51    


 


Urine Color  YELLOW   08/16/23  14:35    


 


Urine Clarity  HAZY  (CLEAR)   08/16/23  14:35    


 


Urine pH  5.5 PH (5.0-7.5)   08/16/23  14:35    


 


Ur Specific Gravity  1.020  (1.002-1.030)   08/16/23  14:35    


 


Urine Protein  TRACE mg/dL (NEGATIVE)   08/16/23  14:35    


 


Urine Glucose (UA)  NEGATIVE mg/dL (NEGATIVE)   08/16/23  14:35    


 


Urine Ketones  NEGATIVE mg/dL (NEGATIVE)   08/16/23  14:35    


 


Urine Occult Blood  NEGATIVE  (NEGATIVE)   08/16/23  14:35    


 


Urine Nitrite  POSITIVE  (NEGATIVE)  H  08/16/23  14:35    


 


Urine Bilirubin  NEGATIVE  (NEGATIVE)   08/16/23  14:35    


 


Urine Urobilinogen  1 (NORMAL) E.U./dL (NORMAL)   08/16/23  14:35    


 


Ur Leukocyte Esterase  TRACE  (NEGATIVE)  H  08/16/23  14:35    


 


Urine RBC  0-5 /HPF (0-5)   08/16/23  14:35    


 


Urine WBC  6-10 /HPF (0-5)  H  08/16/23  14:35    


 


Ur Squamous Epith Cells  FEW Squamous  (<= Few)   08/16/23  14:35    


 


Urine Bacteria  Moderate /HPF (None Seen)  H  08/16/23  14:35    


 


Ur Microscopic Review  INDICATED   08/16/23  14:35    


 


Urine Culture Comments  INDICATED   08/16/23  14:35    


 


Nasal Screen MRSA (PCR)  NEGATIVE  (NEGATIVE)   08/16/23  14:05    














- Procedures


Procedures: 





Procedures





EXCISION OF DESCENDING COLON, ENDO, DIAGN (04/15/19)


EXCISION OF DESCENDING COLON, PERC ENDO APPROACH (04/23/19)


EXCISION OF ILEOCECAL VALVE, ENDO (04/15/19)


EXCISION OF PELVIS LYMPHATIC, PERC ENDO APPROACH, DIAGN (04/23/19)


EXCISION OF TRANSVERSE COLON, PERC ENDO APPROACH (04/23/19)


INTRODUCTION OF OTH THERAP SUBST INTO LOW GI, ENDO (04/15/19)


PARTIAL HIP REPLACEMENT (03/16/15)


REPAIR ABDOMINAL WALL, PERCUTANEOUS ENDOSCOPIC APPROACH (04/23/19)


RESECTION OF BILATERAL FALLOPIAN TUBES, PERC ENDO APPROACH (04/23/19)


RESECTION OF BILATERAL OVARIES, PERC ENDO APPROACH (04/23/19)
Assessment/Plan





- Problem List


(1) Hypoxia


Assessment/Plan: 


At admission, the patient did not have hypoxia. Her CXR was read as infiltrate 

and CHF.  I did not start her on Lasixsince she was dehydrated overall, and  I 

wanted to let the fluids equilibrate.


She then needed suppl O2 at 2L>> 1L


Since she is still requiring supplemental O2 today, I rechecked a chest x-ray.  

Today's chest x-ray continues to show CHF and possible infiltrate and tiny 

effusions


Plan:


I will give her Lasix IV twice daily for several doses


Target O2 sats are 92% and above, give supplemental O2 if needed





(2) Pulmonary edema


Conclusion/Plan: 


Her initial CXR was read as having a probable infiltrate and CHF. She probably 

went into flash pulmonary edema because of sudden rapid HR.


Her periphery however was dehydrated with skin tenting.


Because the patient did not have hypoxia at admission, I did not start her on 

Lasix.  I wanted to let the fluids equilibrate.


Since she is still requiring supplemental O2 today, I read checked a chest x-

ray.  Today's chest x-ray continues to show CHF and possible infiltrate and tiny

effusions


Plan:


I will give her Lasix IV twice daily for 3-4 doses


I am awaiting her Echo result, to tailor treatment (the Echo was not done when 

Echo Tech was here, for unknown reason, and now no scheduled Echo Tech here for 

several days)


Qualifiers: 


   Chronicity: acute   Qualified Code(s): J81.0 - Acute pulmonary edema   





(3) HCAP (healthcare-associated pneumonia)


Conclusion/Plan: 


The patient did not have a cough but this could have been because she was 

intravascularly depleted. WBC was elevated. CXR showed an infiltrate. The 

patient lives in a nursing home and therefore we started treating a healthcare 

associated pneumonia.


Blood cultures were done and a sputum culture, these are neg to date


Plan:


She had been started on empiric Cefepime and Vanco, then has been on 

Ceftriaxone, total of 4 days





(4) UTI due to Klebsiella - N39.0


Conclusion/Plan: 


Her UA was not available until a catheterized specimen was obtained. The U/A was

abnormal and she was already on Cefepime for the HCAP.


Today the results are back on the two bactia growing in the urine.  Urine cx has

Klebsiella pneumoniae and Aerococcus urinae. She was first on empiric cefepime 

then de-escalated to empiric ceftriaxone IV


Per sensitivities available today (which I reviewed), her empiric IV cefepime 

and IV Ceftriaxone covered the Klebsiella pneumoniae, and the Aerococcus urinae


Plan:


In order to complete a 7 day course of treatment for the UTI, I will not change 

her to po Cipro which does not cover the Aerococcus, nor to po Amoxicillin or 

Nitrofurantoin, which does not cover the Klebsiella. Reviewed her sensitivities 

with our pharmacist Bebeto.  I will stop her IV ceftriaxone and start Keflex 500 

mg p.o. every 6 hours.  She needs today then 2 more days of treatment with this 

Keflex





(5) Dementia associated with Parkinson's disease


Conclusion/Plan: 


As per history.  Patient has no tremor however she is minimally communicative 

which may be her flat affect.


I reviewed her EMR.  In the [facility] [place] there is mention of Parkinson's 

with frequent falls.


Today more information was obtained from her RN speaking to the , who 

said the patient "can stand up but always ends up falling down".


Plan:


Cont the resumed Parkinson's meds 


Cont PT and OT 


She needs a shower, will order





(6) Colon cancer


Conclusion/Plan: 


I reviewed her EMR.  The patient is followed in [place] [facility] [place].  She 

has colon cancer.





(7) AMS (altered mental status)


Conclusion/Plan: 


IMPROVED


She carries a diagnosis of dementia and Parkinson's  Patient is mostly nonverbal

here, she is in no distress.  She is able to follow directions, was able to get 

up and walk from bed to chair using a walker, leaning forward.  Then she mostly 

was sleeping all day sitting upright in her chair


Plan:


Cont her Parkinson's meds 


Continue to treat her UTI, HCAP and hypoxia


She needs a shower, will order





(8) Atrial flutter with rapid ventricular response


Assessment/Plan: 


RESOLVED


Aflutter w/ RVR was the reason she was admitted, when her heart rate was found 

to be 150 at the nursing home. The finding of a pneumonia could be the reason 

the patient went into new atrial flutter.


She required a diltiazem drip for rate control, and was admitted to the ICU. 

Upon presentation to the ICU the patient converted spontaneously to NSR at a 

rate of 80. Her B-blocker was increased and the diltiazem drip was titrated to 

off. She then has had short runs of SVT.  


Her EMR described frequent falls. Therefore I did not start anticoagulation and 

continued the 1 baby aspirin daily, which she was on.


A set of troponins ruled out ACS as the cause of the new a flutter


Her TSH came back WNL at 1.38 (all labs were reviewed)


Plan:


Cont the increased B-blocker, changed from daily to twice daily, in order to 

suppress the PSVT's and VT. She occasionally does not swallow, so meds need to 

be crushed.  Therefore, I had to change her Toprol-XL to Metoprolol Tartrate so 

it can be crushed





(9) VTach


Conclusion/Plan: 


RESOLVED


Patient was having monomorphic V. tach runs, which were asymptomatic (telem was 

reviewed).


Assured that her magnesium, potassium and other electrolytes are normal, replace

if low


Plan:


Nilesh awaiting her Echo result to tailor treatment


Cont the increased daily Metoprolol to twice daily both for suppressing 

SVT/atrial flutter and it will help the VTach as well.











- Current Meds


Current Meds: 





                               Current Medications











Generic Name Dose Route Start Last Admin





  Trade Name Freq  PRN Reason Stop Dose Admin


 


Acetaminophen  650 mg  08/16/23 13:15  08/17/23 20:50





  Acetaminophen 325 Mg Tablet  PO   650 mg





  Q4HR PRN   Administration





  Pain 1 to 4, or Fever  


 


Aspirin  81 mg  08/17/23 09:00  08/19/23 10:07





  Aspirin Chew 81 Mg Tablet  PO   81 mg





  DAILY AGUSTINA   Administration


 


Carbidopa/Levodopa  2 tab  08/16/23 17:00  08/19/23 21:02





  Carbidopa/Levodopa 25 Mg/100 Mg Tablet  PO   2 tab





  QID AGUSTINA   Administration


 


Carbidopa/Levodopa  2 tab  08/18/23 23:00  08/19/23 23:02





  Carbidopa/Levodopa 25 Mg/100 Mg Tablet  PO   2 tab





  2300 AGUSTINA   Administration


 


Citalopram Hydrobromide  10 mg  08/16/23 21:00  08/19/23 21:02





  Citalopram 10 Mg Tablet  PO   10 mg





  QPM AGUSTINA   Administration


 


Docusate Sodium  200 mg  08/16/23 21:00  08/19/23 21:02





  Docusate Sodium 100 Mg Capsule  PO   200 mg





  BID AGUSTINA   Administration


 


Enoxaparin Sodium  40 mg  08/17/23 09:00  08/19/23 09:37





  Enoxaparin 40 Mg/0.4 Ml Syringe  SUBQ   40 mg





  DAILY AGUSTINA   Administration


 


Famotidine  20 mg  08/17/23 09:00  08/19/23 10:08





  Famotidine 20 Mg Tablet  PO   20 mg





  DAILY AGUSTINA   Administration


 


Ceftriaxone Sodium 1 gm/  100 mls @ 200 mls/hr  08/18/23 11:00  08/19/23 10:18





  Sodium Chloride  IV   Infused





  DAILY AGUSTINA   Infusion


 


Metoprolol Tartrate  25 mg  08/18/23 21:00  08/19/23 21:02





  Metoprolol Tartrate 25 Mg Tablet  PO   25 mg





  BID AGUSTINA   Administration


 


Multivitamins  1 tab  08/17/23 12:00  08/19/23 10:08





  Multivitamin Tablet  PO   1 tab





  DAILYWM AGUSTINA   Administration


 


**Rivastigmine  1 each  08/16/23 21:00  08/19/23 21:03





Tartrate [  PO   1 each





Rivastigmine] 3 Mg  BID AGUSTINA   Administration





Capsule**   


 


Polyethylene Glycol  17 gm  08/16/23 16:28  08/19/23 10:09





  Polyethylene Glycol 3350 17 Gm Packet  PO   17 gm





  DAILY AGUSTINA   Administration


 


Quetiapine Fumarate  25 mg  08/18/23 21:00  08/19/23 21:02





  Quetiapine 25 Mg Tablet  PO   25 mg





  BID AGUSTINA   Administration


 


Saccharomyces Boulardii  250 mg  08/17/23 08:00  08/19/23 17:17





  Saccharomyces Boulardii 250 Mg Capsule  PO   250 mg





  BIDWM AGUSTINA   Administration


 


Sodium Chloride  10 ml  08/16/23 17:00  08/20/23 04:37





  Sodium Chloride Flush 0.9% 10 Ml Syringe  IVP   10 ml





  0100,0900,1700 AGUSTINA   Administration


 


Sodium Chloride  10 ml  08/16/23 13:15  08/17/23 21:07





  Sodium Chloride Flush 0.9% 10 Ml Syringe  IVP   10 ml





  PRN PRN   Administration





  AS NEEDED PER PROVIDER ORDERS  














- Lab Result


Fish Bone Diagrams: 


                                 08/19/23 04:29





                                 08/20/23 04:37





- Additional Planning


My Orders: 





My Active Orders





08/20/23 08:12


FUROSEMIDE INJ 20mg VIAL [LASIX INJ 20mg VIAL]   20 mg IVP BIDDIURETIC 














Subjective





- Subjective


Patient Reports: Resting Comfortably


Nursing Reports: Other (More alert, communicates in longer sentences with staff,

eating. Is still on O2 supplemental)





Objective


Vital Signs: 





                               Vital Signs - 24 hr











  08/19/23 08/19/23 08/19/23





  09:51 11:52 17:00


 


Temperature  36.3 C L 36.5 C


 


Heart Rate [  73 72





Brachial]   


 


Heart Rate [   





Monitoring   





electrodes]   


 


Respiratory   16





Rate   


 


Blood Pressure 127/67  


 


Blood Pressure   





[Left Brachial   





artery]   


 


Blood Pressure  106/61 108/45 L





[Right Brachial   





artery]   


 


O2 Saturation  93 97


 


If not protocol  2 2





: Oxygen Flow,   





liters/minute   














  08/19/23 08/19/23 08/20/23





  21:00 21:02 01:00


 


Temperature 36.2 C L  36.4 C L


 


Heart Rate [ 86  





Brachial]   


 


Heart Rate [   78





Monitoring   





electrodes]   


 


Respiratory 16  18





Rate   


 


Blood Pressure  115/52 L 


 


Blood Pressure   





[Left Brachial   





artery]   


 


Blood Pressure 115/52 L  120/60





[Right Brachial   





artery]   


 


O2 Saturation 90 L  94


 


If not protocol   1





: Oxygen Flow,   





liters/minute   














  08/20/23 08/20/23





  05:00 07:56


 


Temperature 36.3 C L 36.6 C


 


Heart Rate [ 72 81





Brachial]  


 


Heart Rate [  





Monitoring  





electrodes]  


 


Respiratory 18 16





Rate  


 


Blood Pressure  


 


Blood Pressure 127/59 L 





[Left Brachial  





artery]  


 


Blood Pressure  136/74 H





[Right Brachial  





artery]  


 


O2 Saturation 92 92


 


If not protocol 1 1





: Oxygen Flow,  





liters/minute  








                                     Oxygen











O2 Source [With Activity]      Nasal cannula


 


O2 Source [Without Activity]   Nasal cannula


 


O2 Source                      Nasal cannula














I&O (Last 24 Hrs): 





                          Intake and Output Totals x24h











 08/18/23 08/19/23 08/20/23





 23:59 23:59 23:59


 


Intake Total 530 350 


 


Output Total 500 450 


 


Balance 30 -100 











General: Alert, No acute distress


HEENT: Mucous membr. moist/pink, Other (Disheveled)


Neck: Supple


Neuro: Alert, Disoriented, Non Focal


Cardiovascular: Regular rate, No murmurs


Respiratory: No respiratory distress, Breath sounds nml


Abdomen: Normal bowel sounds, Soft, No tenderness


Extremities: No clubbing, No edema, No tenderness/swelling





- Results


Results: 





                               Laboratory Results











WBC  8.0 x10^3/uL (4.8-10.8)   08/19/23  04:29    


 


RBC  3.22 10^6/uL (4.20-5.40)  L  08/19/23  04:29    


 


Hgb  9.9 g/dL (12.0-16.0)  L  08/19/23  04:29    


 


Hct  31.3 % (37.0-47.0)  L  08/19/23  04:29    


 


MCV  97.2 fL (81.0-99.0)   08/19/23  04:29    


 


MCH  30.7 pg (27.0-31.0)   08/19/23  04:29    


 


MCHC  31.6 g/dL (32.0-36.0)  L  08/19/23  04:29    


 


RDW  14.1 % (12.0-15.0)   08/19/23  04:29    


 


Plt Count  242 10^3/uL (130-450)   08/19/23  04:29    


 


MPV  8.6 fL (7.9-10.8)   08/19/23  04:29    


 


Neut # (Auto)  5.7 10^3/uL (1.5-6.6)   08/19/23  04:29    


 


Lymph # (Auto)  1.2 10^3/uL (1.5-3.5)  L  08/19/23  04:29    


 


Mono # (Auto)  0.6 10^3/uL (0.0-1.0)   08/19/23  04:29    


 


Eos # (Auto)  0.3 10^3/uL (0.0-0.7)   08/19/23  04:29    


 


Baso # (Auto)  0.0 10^3/uL (0.0-0.1)   08/19/23  04:29    


 


Absolute Nucleated RBC  0.00 x10^3/uL  08/19/23  04:29    


 


Nucleated RBC %  0.0 /100WBC  08/19/23  04:29    


 


VBG pH  7.420  (7.31-7.41)  H  08/19/23  04:29    


 


Ionized Calcium  1.12 mmol/L (1.15-1.33)  L  08/19/23  04:29    


 


Sodium  141 mmol/L (135-145)   08/20/23  04:37    


 


Potassium  4.2 mmol/L (3.5-4.5)   08/20/23  04:37    


 


Chloride  111 mmol/L (101-111)   08/20/23  04:37    


 


Carbon Dioxide  25 mmol/L (21-32)   08/20/23  04:37    


 


Anion Gap  5.0  (6-13)  L  08/20/23  04:37    


 


BUN  17 mg/dL (6-20)   08/20/23  04:37    


 


Creatinine  0.7 mg/dL (0.6-1.3)   08/20/23  04:37    


 


Estimated GFR (MDRD)  81  (>89)  L  08/20/23  04:37    


 


Glucose  99 mg/dL ()   08/20/23  04:37    


 


Calcium  8.5 mg/dL (8.5-10.3)   08/20/23  04:37    


 


Phosphorus  3.1 mg/dL (2.5-5.0)   08/19/23  04:29    


 


Magnesium  2.0 mg/dL (1.7-2.8)   08/17/23  04:57    


 


Total Bilirubin  0.7 mg/dL (0.2-1.0)   08/17/23  04:57    


 


AST  55 IU/L (10-42)  H  08/17/23  04:57    


 


ALT  17 IU/L (10-60)   08/17/23  04:57    


 


Alkaline Phosphatase  66 IU/L ()   08/17/23  04:57    


 


Troponin I High Sens  538.8 ng/L (2.3-14.8)  H*  08/16/23  14:22    


 


B-Natriuretic Peptide  823 pg/mL (5-100)  H  08/16/23  11:05    


 


Total Protein  6.1 g/dL (6.7-8.2)  L  08/17/23  04:57    


 


Albumin  3.1 g/dL (3.2-5.5)  L  08/17/23  04:57    


 


Globulin  3.0 g/dL (2.1-4.2)   08/17/23  04:57    


 


Albumin/Globulin Ratio  1.0  (1.0-2.2)   08/17/23  04:57    


 


Lipase  24 U/L (22-51)   08/16/23  11:05    


 


Vitamin B12  654 pg/mL (180-914)   08/17/23  04:57    


 


Folate  16.0 ng/mL (5.90 - >24.8)   08/17/23  04:57    


 


TSH  1.38 uIU/mL (0.34-5.60)   08/18/23  04:51    


 


Urine Color  YELLOW   08/16/23  14:35    


 


Urine Clarity  HAZY  (CLEAR)   08/16/23  14:35    


 


Urine pH  5.5 PH (5.0-7.5)   08/16/23  14:35    


 


Ur Specific Gravity  1.020  (1.002-1.030)   08/16/23  14:35    


 


Urine Protein  TRACE mg/dL (NEGATIVE)   08/16/23  14:35    


 


Urine Glucose (UA)  NEGATIVE mg/dL (NEGATIVE)   08/16/23  14:35    


 


Urine Ketones  NEGATIVE mg/dL (NEGATIVE)   08/16/23  14:35    


 


Urine Occult Blood  NEGATIVE  (NEGATIVE)   08/16/23  14:35    


 


Urine Nitrite  POSITIVE  (NEGATIVE)  H  08/16/23  14:35    


 


Urine Bilirubin  NEGATIVE  (NEGATIVE)   08/16/23  14:35    


 


Urine Urobilinogen  1 (NORMAL) E.U./dL (NORMAL)   08/16/23  14:35    


 


Ur Leukocyte Esterase  TRACE  (NEGATIVE)  H  08/16/23  14:35    


 


Urine RBC  0-5 /HPF (0-5)   08/16/23  14:35    


 


Urine WBC  6-10 /HPF (0-5)  H  08/16/23  14:35    


 


Ur Squamous Epith Cells  FEW Squamous  (<= Few)   08/16/23  14:35    


 


Urine Bacteria  Moderate /HPF (None Seen)  H  08/16/23  14:35    


 


Ur Microscopic Review  INDICATED   08/16/23  14:35    


 


Urine Culture Comments  INDICATED   08/16/23  14:35    


 


Nasal Screen MRSA (PCR)  NEGATIVE  (NEGATIVE)   08/16/23  14:05    














- Procedures


Procedures: 





Procedures





EXCISION OF DESCENDING COLON, ENDO, DIAGN (04/15/19)


EXCISION OF DESCENDING COLON, PERC ENDO APPROACH (04/23/19)


EXCISION OF ILEOCECAL VALVE, ENDO (04/15/19)


EXCISION OF PELVIS LYMPHATIC, PERC ENDO APPROACH, DIAGN (04/23/19)


EXCISION OF TRANSVERSE COLON, PERC ENDO APPROACH (04/23/19)


INTRODUCTION OF OTH THERAP SUBST INTO LOW GI, ENDO (04/15/19)


PARTIAL HIP REPLACEMENT (03/16/15)


REPAIR ABDOMINAL WALL, PERCUTANEOUS ENDOSCOPIC APPROACH (04/23/19)


RESECTION OF BILATERAL FALLOPIAN TUBES, PERC ENDO APPROACH (04/23/19)


RESECTION OF BILATERAL OVARIES, PERC ENDO APPROACH (04/23/19)
Nocturnist Note





- Nocturnist Note


Nocturnist Note: 





**Consult Information**


Member Facility: [facility]


Facility MRN: [id number]


Visit/Encounter Number: [id number]





Requesting Clinician: Geraldine Johnson


Patient Name: Mya Liu


YOB: 1945


Gender: Female





**Reason for Consult**


Reason for Consult: Review Patient Care





**Clinical Note**


Clinical Note: per rn - 





"CT results back,patients exam about the same, does open eyes at times, blood 

pressure a bit soft 103/42 map of 57,,she did have 17 beats of extreme 

tachycardia( in the 130) strip in chart, her breathing pattern is better does 

have some episodes of apnea,blood sugar is in the 90's, patient currently does 

not pass dysphagia screen"





check cbc, cmp, mag





start LR @ 75 ml/hr





check MRI brain





hypoglycemia protocol
Nocturnist Note





- Nocturnist Note


Nocturnist Note: 





Nocturnist Telemedicine Note: 


RN message-





"Patient has been admitted since the 16th for a fib rvr that has resolved, at 

baseline hx of dementia and parkinsons, has been very minimally communicative 

and flat, however was alert x1 per previous shift and was able to follow 

commands, currently only responds to painful stimuli, groans only no words, does

not follow commands, she was given Seroquel 25,sinemet,celexa and rivastigmine 

at about 2100 tonight.Not sure if thats contributing.Her sinemet and abx were 

held tonight, due to current status."





Chart reviewed.


Orders placed: 


Stat CT Head





~Jessy Smiley MD


Hospitalist
Subjective





- Prog Note Date


Prog Note Date: 08/22/23





- Subjective


Subjective: 


Denies shortness of breath or chest pain.





Current Medications





- Current Medications


Current Medications: 





Active Medications





Acetaminophen (Acetaminophen 325 Mg Tablet)  650 mg PO Q4HR PRN


   PRN Reason: Pain 1 to 4, or Fever


   Last Admin: 08/21/23 17:40 Dose:  650 mg


   


Acetaminophen (Acetaminophen 325 Mg Tablet)  650 mg PO Q6H PRN


   PRN Reason: FEVER > 100.5 F


Aspirin (Aspirin Chew 81 Mg Tablet)  81 mg PO DAILY [facility]


   Last Admin: 08/22/23 08:13 Dose:  81 mg


   


Bisacodyl (Bisacodyl 10 Mg Supp)  10 mg OK DAILY PRN


   PRN Reason: Constipation


Carbidopa/Levodopa (Carbidopa/Levodopa 25 Mg/100 Mg Tablet)  2 tab PO QID [facility]


   Last Admin: 08/22/23 08:14 Dose:  2 tab


   


Carbidopa/Levodopa (Carbidopa/Levodopa 25 Mg/100 Mg Tablet)  2 tab PO 2300 [facility]


   Last Admin: 08/22/23 00:13 Dose:  2 tab


   


Cephalexin (Cephalexin 250 Mg Capsule)  500 mg PO Q6HR [facility]


   Last Admin: 08/22/23 05:28 Dose:  500 mg


   


Citalopram Hydrobromide (Citalopram 10 Mg Tablet)  10 mg PO QPM [facility]


   Last Admin: 08/21/23 21:25 Dose:  10 mg


   


Docusate Sodium (Docusate Sodium 100 Mg Capsule)  200 mg PO BID [facility]


   Last Admin: 08/22/23 08:14 Dose:  Not Given


   


Enoxaparin Sodium (Enoxaparin 40 Mg/0.4 Ml Syringe)  40 mg SUBQ DAILY [facility]


   Last Admin: 08/22/23 08:14 Dose:  40 mg


   


Famotidine (Famotidine 20 Mg Tablet)  20 mg PO DAILY [facility]


   Last Admin: 08/22/23 08:14 Dose:  20 mg


   


Furosemide (Furosemide 20 Mg Tablet)  20 mg PO BIDDIURETIC [facility]


Metoprolol Tartrate (Metoprolol Tartrate 25 Mg Tablet)  25 mg PO BID [facility]


   Last Admin: 08/22/23 08:14 Dose:  25 mg


   


Mineral Oil (Min Oil/Dimethicon/Coconut Oil 92 Gm Tube)  1 applic TOP PRN PRN


   PRN Reason: Skin Care


Multivitamins (Multivitamin Tablet)  1 tab PO DAILYWM [facility]


   Last Admin: 08/22/23 08:14 Dose:  1 tab


   


Ondansetron HCl (Ondansetron 4 Mg/2 Ml Vial)  4 mg IVP Q6HR PRN


   PRN Reason: Nausea / Vomiting


**Rivastigmine Tartrate [ Rivastigmine] 3 Mg Capsule**  1 each PO BID [facility]


   Last Admin: 08/22/23 08:13 Dose:  1 each


   


Polyethylene Glycol (Polyethylene Glycol 3350 17 Gm Packet)  17 gm PO DAILY [facility]


   Last Admin: 08/22/23 08:15 Dose:  Not Given


   


Quetiapine Fumarate (Quetiapine 25 Mg Tablet)  25 mg PO BID [facility]


   Last Admin: 08/22/23 08:14 Dose:  25 mg


   


Saccharomyces Boulardii (Saccharomyces Boulardii 250 Mg Capsule)  [facility] 

[facility]


   Last Admin: 08/22/23 08:13 Dose:  250 mg


   


Senna (Senna 8.6 Mg Tablet)  17.2 mg PO QPM PRN


   PRN Reason: Constipation


Sodium Chloride (Sodium Chloride Flush 0.9% 10 Ml Syringe)  10 ml IVP 

0100,0900,1700 [facility]


   Last Admin: 08/22/23 08:15 Dose:  10 ml


   


Sodium Chloride (Sodium Chloride Flush 0.9% 10 Ml Syringe)  10 ml IVP PRN PRN


   PRN Reason: AS NEEDED PER PROVIDER ORDERS


   Last Admin: 08/20/23 13:43 Dose:  10 ml


   





                                        





Carbidopa/Levodopa 25/100 [Sinemet 25 mg/100 mg] 2 tab PO HS 03/18/15 


Citalopram [CeleXA] 10 mg PO QPM 03/18/15 


Carbidopa/Levodopa 25/100 [Sinemet 25 mg/100 mg] 2 tab PO QID 04/23/19 


Atorvastatin [Lipitor] 20 mg PO DAILY PM 09/22/21 


Metoprolol Succinate [Toprol Xl] 25 mg PO DAILY 09/22/21 


Quetiapine Fumarate [Seroquel] 50 mg PO BID 03/31/22 


Rivastigmine Tartrate [Rivastigmine] 3 mg PO BID 03/31/22 


Bisacodyl Supp [Dulcolax Supp] 10 mg OK DAILY PRN 10/26/22 


Acetaminophen [Tylenol] 650 mg PO Q6H PRN 08/16/23 


Aspirin [[place] Aspirin] 81 mg PO DAILY 08/16/23 


Docusate Sodium 100Mg Capsule [Colace 100Mg Capsule] 200 mg PO BID 08/16/23 


Mineral Oil [Mineral Oil Enema] 1 unit OK ONCE PRN 08/16/23 


Senna [Senokot] 2 tab PO QPM PRN 08/16/23 


polyethylene glycoL 3350 [Miralax] 17 g PO ONCE PRN 08/16/23 











Objective





- Vital Signs/Intake & Output


Reviewed Vital Signs: Yes


Vital Signs: 


                                Vital Signs x48h











  Temp Pulse Resp BP Pulse Ox O2 Flow Rate


 


 08/22/23 05:01  36.2 C L  72  18  123/55 L  98  1











Intake & Output: 


                                 Intake & Output











 08/19/23 08/20/23 08/21/23 08/22/23





 23:59 23:59 23:59 23:59


 


Intake Total 350 460 400 


 


Output Total 450 1900 1575 100


 


Balance -100 -1440 -1175 -100














- Objective


General Appearance: positive: No acute distress, Alert


Eyes Bilateral: positive: Normal inspection, Conjunctivae nml


ENT: positive: ENT inspection nml


Neck: positive: Nml inspection


Respiratory: positive: No respiratory distress.  negative: Wheezes, Rales


Cardiovascular: positive: Regular rate & rhythm, No murmur.  negative: 

Tachycardia


Abdomen: positive: Non-tender, No distention.  negative: Tenderness


Skin: positive: Warm, Dry


Extremities: positive: No pedal edema


Neurologic/Psychiatric: positive: Disoriented to place, Disoriented to time, 

Other (No focal deficits.).  negative: Disoriented to person





- Lab Results


Fish Bones: 


                                 08/22/23 06:27





                                 08/22/23 06:27


Other Labs: 


                               Lab Results x24hrs











  08/22/23 08/22/23 08/22/23 Range/Units





  06:27 06:27 06:27 


 


WBC    12.2 H  (4.8-10.8)  x10^3/uL


 


RBC    3.70 L  (4.20-5.40)  10^6/uL


 


Hgb    11.4 L  (12.0-16.0)  g/dL


 


Hct    36.0 L  (37.0-47.0)  %


 


MCV    97.3  (81.0-99.0)  fL


 


MCH    30.8  (27.0-31.0)  pg


 


MCHC    31.7 L  (32.0-36.0)  g/dL


 


RDW    14.2  (12.0-15.0)  %


 


Plt Count    260  (130-450)  10^3/uL


 


MPV    8.5  (7.9-10.8)  fL


 


Neut # (Auto)    9.4 H  (1.5-6.6)  10^3/uL


 


Lymph # (Auto)    1.5  (1.5-3.5)  10^3/uL


 


Mono # (Auto)    0.8  (0.0-1.0)  10^3/uL


 


Eos # (Auto)    0.4  (0.0-0.7)  10^3/uL


 


Baso # (Auto)    0.1  (0.0-0.1)  10^3/uL


 


Absolute Nucleated RBC    0.00  x10^3/uL


 


Nucleated RBC %    0.0  /100WBC


 


Sodium   145   (135-145)  mmol/L


 


Potassium   4.4   (3.5-4.5)  mmol/L


 


Chloride   105   (101-111)  mmol/L


 


Carbon Dioxide   34 H   (21-32)  mmol/L


 


Anion Gap   6.0   (6-13)  


 


BUN   19   (6-20)  mg/dL


 


Creatinine   1.0   (0.6-1.3)  mg/dL


 


Estimated GFR (MDRD)   54 L   (>89)  


 


Glucose   104   ()  mg/dL


 


POC Whole Bld Glucose     (70 - 100)  mg/dL


 


Calcium   9.0   (8.5-10.3)  mg/dL


 


Phosphorus   3.4   (2.5-5.0)  mg/dL


 


Magnesium   1.8   (1.7-2.3)  mg/dL


 


Total Bilirubin   0.4   (0.2-1.0)  mg/dL


 


AST   20   (10-42)  IU/L


 


ALT   16   (10-60)  IU/L


 


Alkaline Phosphatase   79   ()  IU/L


 


B-Natriuretic Peptide  217 H    (5-100)  pg/mL


 


Total Protein   6.1 L   (6.4-8.9)  g/dL


 


Albumin   3.2   (3.2-5.5)  g/dL


 


Globulin   2.9   (2.1-4.2)  g/dL


 


Albumin/Globulin Ratio   1.1   (1.0-2.2)  














  08/21/23 Range/Units





  16:50 


 


WBC   (4.8-10.8)  x10^3/uL


 


RBC   (4.20-5.40)  10^6/uL


 


Hgb   (12.0-16.0)  g/dL


 


Hct   (37.0-47.0)  %


 


MCV   (81.0-99.0)  fL


 


MCH   (27.0-31.0)  pg


 


MCHC   (32.0-36.0)  g/dL


 


RDW   (12.0-15.0)  %


 


Plt Count   (130-450)  10^3/uL


 


MPV   (7.9-10.8)  fL


 


Neut # (Auto)   (1.5-6.6)  10^3/uL


 


Lymph # (Auto)   (1.5-3.5)  10^3/uL


 


Mono # (Auto)   (0.0-1.0)  10^3/uL


 


Eos # (Auto)   (0.0-0.7)  10^3/uL


 


Baso # (Auto)   (0.0-0.1)  10^3/uL


 


Absolute Nucleated RBC   x10^3/uL


 


Nucleated RBC %   /100WBC


 


Sodium   (135-145)  mmol/L


 


Potassium   (3.5-4.5)  mmol/L


 


Chloride   (101-111)  mmol/L


 


Carbon Dioxide   (21-32)  mmol/L


 


Anion Gap   (6-13)  


 


BUN   (6-20)  mg/dL


 


Creatinine   (0.6-1.3)  mg/dL


 


Estimated GFR (MDRD)   (>89)  


 


Glucose   ()  mg/dL


 


POC Whole Bld Glucose  100  (70 - 100)  mg/dL


 


Calcium   (8.5-10.3)  mg/dL


 


Phosphorus   (2.5-5.0)  mg/dL


 


Magnesium   (1.7-2.3)  mg/dL


 


Total Bilirubin   (0.2-1.0)  mg/dL


 


AST   (10-42)  IU/L


 


ALT   (10-60)  IU/L


 


Alkaline Phosphatase   ()  IU/L


 


B-Natriuretic Peptide   (5-100)  pg/mL


 


Total Protein   (6.4-8.9)  g/dL


 


Albumin   (3.2-5.5)  g/dL


 


Globulin   (2.1-4.2)  g/dL


 


Albumin/Globulin Ratio   (1.0-2.2)  














Assessment/Plan





- Problem List


(1) Hypoxia


Impression: 


Initially treated for HCAP and CHF for the hypoxia.  She is still 0.5 L of 

oxygen but her oxygen saturations are improved and BNP is trending down to the 

200s.  She does not appear to have crackles on exam and she has no lower 

extremity edema. Chest x-ray from 08/20 suggested ongoing pulmonary edema.


Plan:


We will continue with IV Lasix this morning before switching to oral diuretics 

this afternoon and I suspect she should be on room air by then


If she is still hypoxic we will repeat chest x-ray otherwise she can hopefully 

be discharged tomorrow if she is on room air and tolerating oral diuretics





(2) Pulmonary edema


Conclusion/Plan: 


Her initial CXR was read as having a probable infiltrate and CHF. She probably 

went into flash pulmonary edema because of sudden rapid HR.


Chest x-ray from 08/20 suggest ongoing pulmonary edema but her oxygen 

requirements are improved as mentioned above.  Echocardiogram revealed a 

preserved ejection fraction. Her BNP is down to the 200s from 800s on admission.


Plan:


Continue IV Lasix this morning for switching to oral diuretics this afternoon


Continue strict I's and O's and daily weights


Qualifiers: 


   Chronicity: acute   Qualified Code(s): J81.0 - Acute pulmonary edema   





(3) HCAP (healthcare-associated pneumonia)


Conclusion/Plan: 


The patient did not have a cough but this could have been because she was 

intravascularly depleted. WBC was elevated. CXR showed an infiltrate. She was 

treated for HCAP initially given she resides at a nursing home.  Blood cultures 

have been negative to date and IV antibiotics have been discontinued.





(4) UTI due to Klebsiella - N39.0


Conclusion/Plan: 


Her UA was not available until a catheterized specimen was obtained. The U/A was

 abnormal and she was already on Cefepime for the HCAP.


On 8/20 the results returned showing 2 bacteria growing in the urine.  Urine cx 

has Klebsiella pneumoniae and Aerococcus urinae. She was first on empiric 

cefepime then de-escalated to empiric ceftriaxone IV. Now on keflex.


Plan:


Continue Keflex to treat for total of 7 days with today being the last day





(5) Dementia associated with Parkinson's disease


Conclusion/Plan: 


Patient has no tremor however she is minimally communicative which may be her 

flat affect.


Plan:


Cont the resumed Parkinson's meds 


Cont PT and OT 





(6) Colon cancer


Conclusion/Plan: 


The patient is followed in oncology [facility] clinic care.  She had colon cancer.





(7) AMS (altered mental status)


Conclusion/Plan: 


IMPROVED


She carries a diagnosis of dementia and Parkinson's.  Patient is minimially 

verbal here, she is in no distress.  She is able to follow directions, was able 

to get up and walk from bed to chair using a walker, leaning forward. At 

baseline.


Plan:


Cont her Parkinson's meds 





(8) Atrial flutter with rapid ventricular response


Assessment/Plan: 


RESOLVED


Aflutter w/ RVR was the reason she was admitted, when her heart rate was found 

to be 150 at the nursing home. The finding of a pneumonia could be the reason 

the patient went into new atrial flutter.


She required a diltiazem drip for rate control, and was admitted to the ICU. 

Upon presentation to the ICU the patient converted spontaneously to NSR at a 

rate of 80. Her B-blocker was increased and the diltiazem drip was titrated to 

off. She then has had short runs of SVT.  


Her EMR described frequent falls.


A set of troponins ruled out ACS as the cause of the new a flutter


Her TSH came back WNL at 1.38 (all labs were reviewed)


Plan:


Continue the increased B-blocker, changed from daily to twice daily, in order to

 suppress the PSVT's and VT


Continue aspirin 





(9) VTach


Conclusion/Plan: 


RESOLVED


Patient was having monomorphic V. tach runs, which were asymptomatic.


Echocardiogram revealed no significant structural disease


Plan:


Continue Metoprolol twice daily both for suppressing SVT/atrial flutter and it 

will help the VTach as well.
Subjective





- Subjective


Pt reports feeling: Improved (Sleepy in the morning, communicative and able to 

participate with PT in the afternoon, but disoriented, thinks she is in "[facility]")





Objective





- Vital Signs/Intake & Output


Vital Signs: 





                                   Vital Signs











  Pulse Resp BP Pulse Ox O2 Flow Rate


 


 08/18/23 13:00  88  22  110/58 L  95  1











Intake & Output: 





                                 Intake & Output











 08/15/23 08/16/23 08/17/23 08/18/23





 23:59 23:59 23:59 23:59


 


Intake Total  4204.455 1860.25 480


 


Output Total  470 150 300


 


Balance  614.131 2143.25 180














- Objective


General Appearance: positive: No acute distress, Lethargic, Other (Disheveled)


Eyes Bilateral: positive: Normal inspection, EOMI


ENT: positive: Dry mucous membranes, Other (Disheveled, greasy face)


Neck: positive: Nml inspection


Respiratory: positive: No respiratory distress, Other (Distant breath sounds)


Cardiovascular: positive: Regular rate & rhythm, No murmur


Abdomen: positive: Non-tender, No distention


Skin: positive: Warm, Dry


Extremities: positive: Non-tender, No pedal edema


Neurologic/Psychiatric: positive: Disoriented to person, Disoriented to place, 

Disoriented to time, Slurred/abnml speech (Slow speech, word salad)





- Lab Results


Fish Bones: 


                                 08/19/23 04:29





                                 08/19/23 04:29


Other Labs: 





                               Lab Results x24hrs











  08/18/23 08/18/23 08/18/23 Range/Units





  07:25 07:25 07:25 


 


WBC    8.9  (4.8-10.8)  x10^3/uL


 


RBC    3.18 L  (4.20-5.40)  10^6/uL


 


Hgb    10.1 L  (12.0-16.0)  g/dL


 


Hct    30.8 L  (37.0-47.0)  %


 


MCV    96.9  (81.0-99.0)  fL


 


MCH    31.8 H  (27.0-31.0)  pg


 


MCHC    32.8  (32.0-36.0)  g/dL


 


RDW    14.1  (12.0-15.0)  %


 


Plt Count    230  (130-450)  10^3/uL


 


MPV    8.8  (7.9-10.8)  fL


 


Neut # (Auto)    7.2 H  (1.5-6.6)  10^3/uL


 


Lymph # (Auto)    0.7 L  (1.5-3.5)  10^3/uL


 


Mono # (Auto)    0.6  (0.0-1.0)  10^3/uL


 


Eos # (Auto)    0.3  (0.0-0.7)  10^3/uL


 


Baso # (Auto)    0.1  (0.0-0.1)  10^3/uL


 


Absolute Nucleated RBC    0.00  x10^3/uL


 


Nucleated RBC %    0.0  /100WBC


 


VBG pH   7.402   (7.31-7.41)  


 


Ionized Calcium   1.12 L   (1.15-1.33)  mmol/L


 


Sodium  138    (135-145)  mmol/L


 


Potassium  4.1    (3.5-4.5)  mmol/L


 


Chloride  109    (101-111)  mmol/L


 


Carbon Dioxide  24    (21-32)  mmol/L


 


Anion Gap  5.0 L    (6-13)  


 


BUN  24 H    (6-20)  mg/dL


 


Creatinine  1.0    (0.6-1.3)  mg/dL


 


Estimated GFR (MDRD)  54 L    (>89)  


 


Glucose  93    ()  mg/dL


 


Calcium  8.5    (8.5-10.3)  mg/dL


 


Phosphorus     (2.5-4.6)  mg/dL


 


TSH     (0.34-5.60)  uIU/mL














  08/18/23 08/17/23 Range/Units





  04:51 15:36 


 


WBC    (4.8-10.8)  x10^3/uL


 


RBC    (4.20-5.40)  10^6/uL


 


Hgb    (12.0-16.0)  g/dL


 


Hct    (37.0-47.0)  %


 


MCV    (81.0-99.0)  fL


 


MCH    (27.0-31.0)  pg


 


MCHC    (32.0-36.0)  g/dL


 


RDW    (12.0-15.0)  %


 


Plt Count    (130-450)  10^3/uL


 


MPV    (7.9-10.8)  fL


 


Neut # (Auto)    (1.5-6.6)  10^3/uL


 


Lymph # (Auto)    (1.5-3.5)  10^3/uL


 


Mono # (Auto)    (0.0-1.0)  10^3/uL


 


Eos # (Auto)    (0.0-0.7)  10^3/uL


 


Baso # (Auto)    (0.0-0.1)  10^3/uL


 


Absolute Nucleated RBC    x10^3/uL


 


Nucleated RBC %    /100WBC


 


VBG pH   7.440 H  (7.31-7.41)  


 


Ionized Calcium   1.14 L  (1.15-1.33)  mmol/L


 


Sodium    (135-145)  mmol/L


 


Potassium    (3.5-4.5)  mmol/L


 


Chloride    (101-111)  mmol/L


 


Carbon Dioxide    (21-32)  mmol/L


 


Anion Gap    (6-13)  


 


BUN    (6-20)  mg/dL


 


Creatinine    (0.6-1.3)  mg/dL


 


Estimated GFR (MDRD)    (>89)  


 


Glucose    ()  mg/dL


 


Calcium    (8.5-10.3)  mg/dL


 


Phosphorus  3.4   (2.5-4.6)  mg/dL


 


TSH  1.38   (0.34-5.60)  uIU/mL














Assessment/Plan





- Problem List


(1) Atrial flutter with rapid ventricular response


Impression: 


The finding of a pneumonia could be the reason the patient went into new atrial 

flutter.


A diltiazem drip was started for rate control, and the patient was admitted to 

the ICU. Upon presentation to the ICU the patient converted spontaneously to NSR

at a rate of 80. She then has had short runs of SVT.  Her diltiazem drip was t

itrated to off as I started her on BID Metoprolol yesterday 


There is mention of Parkinson's with frequent falls. Therefore I did not start 

anticoagulation and continued the 1 baby aspirin daily, which she is on.


A set of troponins ruled out ACS as the cause of the new a flutter


Her TSH came back WNL at 1.38 (all labs were reviewed)


Plan:


I have increased her B-blocker from daily to twice daily in order to suppress 

the PSVT's she is still getting. This morning she would not swallow, meds need 

to be crushed.  I need to change her Toprol-XL to Metoprolol Tartrate so it can 

be crushed


I will transfer her out of the the ICU today, if she does not have recurrence of

sustained Aflutter with RVR on her crushed meds





(2) Pulmonary edema


Conclusion/Plan: 


Her chest x-ray was read as having a probable infiltrate and CHF. She probably 

went into flash pulmonary edema because of sudden rapid HR.


Her periphery however is dehydrated with skin tenting.


He has needed O2 since admission, when her O2 sat dropped to 89%. Now she is on 

2 L via nasal cannula, is saturating at 96%.


Plan:


Because the patient did not have hypoxia, I did not start her on Lasix.  I wante

d to let the fluids equilibrate.


Follow her BNP daily


Qualifiers: 


   Chronicity: acute   Qualified Code(s): J81.0 - Acute pulmonary edema   





(3) VTach


Conclusion/Plan: 


Patient is having monomorphic V. tach runs, which are asymptomatic (telem was 

reviewed).


Plan:


Assure that her magnesium, potassium and other electrolytes are normal, replace 

if low


Awaiting her Echo result


I have increased her daily Toprol to twice daily both for suppressing SVT/atrial

flutter and it will help the tach as well.





(4) HCAP (healthcare-associated pneumonia)


Conclusion/Plan: 


The patient still does not have a cough but this could be because she was 

intravascularly depleted. WBC was elevated. CXR shows an infiltrate. The patient

lives in a nursing home and therefore she may have a healthcare associated 

pneumonia.


Blood cultures were done and a sputum culture has been ordered.


Plan:


Cont supplemental O2 to keep O2 sats 92% or above


Cont empiric IV cefepime and IV Vanco


Await cx results to tailor antibx





(4) UTI


Conclusion/Plan: 


Her UA was not available until a catheterized specimen was obtained. The urine 

analysis was abnormal with many WBC and many bacteria.


The urine culture is already growing a gram-negative diogo


Plan:


Her empiric IV cefepime covers her gram-negative UTI


Await for identification and sensitivities to tailor antibiotics





(5) Dementia associated with Parkinson's disease


Conclusion/Plan: 


As per history.  Patient has no tremor however she is minimally communicative 

which may be her flat affect.


I reviewed her EMR.  In the [facility] note there is mention of Parkinson's 

with frequent falls.


Today more information was obtained from her RN speaking to the , who 

said the patient "can stand up but always ends up falling down".


Plan:


Cont the resumed Parkinson's meds 


I will order PT and OT to eval her





(6) Colon cancer


Conclusion/Plan: 


I reviewed her EMR.  The patient is followed in oncology [facility] clinic care.  She 

has colon cancer.





(7) AMS (altered mental status)


Conclusion/Plan: 


IMPROVED


She carries a diagnosis of dementia and Parkinson's  Patient is mostly nonverbal

here, she is in no distress.  She is able to follow directions, was able to get 

up and walk from bed to chair using a walker, leaning forward.  Then she mostly 

was sleeping all day sitting upright in her chair


Plan:


Cont her Parkinson's meds 


Continue to treat her UTI, HCAP and hypoxia
Subjective





- Subjective


Pt reports feeling: No change (Asleep, does not awaken to name and minimally 

responds to touch)





Objective





- Vital Signs/Intake & Output


Reviewed Vital Signs: Yes


Vital Signs: 





                                   Vital Signs











  Temp Pulse Resp BP Pulse Ox O2 Flow Rate


 


 08/17/23 14:00   93  17  107/89 H  97  2


 


 08/17/23 13:00   93  25 H  122/103 H  96  2


 


 08/17/23 12:00  36.5 C  81  21  119/86 H  97  2











Intake & Output: 





                                 Intake & Output











 08/14/23 08/15/23 08/16/23 08/17/23





 23:59 23:59 23:59 23:59


 


Intake Total   2490.774 1771.25


 


Output Total   470 50


 


Balance   840.193 7931.25














- Objective


General Appearance: positive: Lethargic


Eyes Bilateral: positive: No lid inflammation


ENT: positive: Dry mucous membranes


Neck: positive: Nml inspection, No JVD


Respiratory: positive: No respiratory distress, Breath sounds nml


Cardiovascular: positive: Regular rate & rhythm, No murmur


Abdomen: positive: Non-tender, Nml bowel sounds, No distention


Skin: positive: Warm, Dry, Other (decreased turgor)


Extremities: positive: No pedal edema


Neurologic/Psychiatric: positive: Other (Lethargic, awakens minimally to touch 

or voice)





- Lab Results


Fish Bones: 


                                 08/19/23 04:29





                                 08/19/23 04:29


Other Labs: 





                               Lab Results x24hrs











  08/17/23 08/17/23 08/17/23 Range/Units





  11:29 04:57 04:57 


 


WBC     (4.8-10.8)  x10^3/uL


 


RBC     (4.20-5.40)  10^6/uL


 


Hgb     (12.0-16.0)  g/dL


 


Hct     (37.0-47.0)  %


 


MCV     (81.0-99.0)  fL


 


MCH     (27.0-31.0)  pg


 


MCHC     (32.0-36.0)  g/dL


 


RDW     (12.0-15.0)  %


 


Plt Count     (130-450)  10^3/uL


 


MPV     (7.9-10.8)  fL


 


Neut # (Auto)     (1.5-6.6)  10^3/uL


 


Lymph # (Auto)     (1.5-3.5)  10^3/uL


 


Mono # (Auto)     (0.0-1.0)  10^3/uL


 


Eos # (Auto)     (0.0-0.7)  10^3/uL


 


Baso # (Auto)     (0.0-0.1)  10^3/uL


 


Absolute Nucleated RBC     x10^3/uL


 


Nucleated RBC %     /100WBC


 


VBG pH  7.443 H   7.525 H  (7.31-7.41)  


 


Ionized Calcium  1.01 L   1.02 L  (1.15-1.33)  mmol/L


 


Sodium     (135-145)  mmol/L


 


Potassium     (3.5-5.0)  mmol/L


 


Chloride     (101-111)  mmol/L


 


Carbon Dioxide     (21-32)  mmol/L


 


Anion Gap     (6-13)  


 


BUN     (6-20)  mg/dL


 


Creatinine     (0.4-1.0)  mg/dL


 


Estimated GFR (MDRD)     (>89)  


 


Glucose     ()  mg/dL


 


Calcium     (8.5-10.3)  mg/dL


 


Phosphorus     (2.5-4.6)  mg/dL


 


Magnesium     (1.7-2.8)  mg/dL


 


Total Bilirubin     (0.2-1.0)  mg/dL


 


AST     (10-42)  IU/L


 


ALT     (10-60)  IU/L


 


Alkaline Phosphatase     ()  IU/L


 


Total Protein     (6.7-8.2)  g/dL


 


Albumin     (3.2-5.5)  g/dL


 


Globulin     (2.1-4.2)  g/dL


 


Albumin/Globulin Ratio     (1.0-2.2)  


 


Vitamin B12   654   (180-914)  pg/mL


 


Folate   16.0   (5.90 - >24.8)  ng/mL


 


Nasal Screen MRSA (PCR)     (NEGATIVE)  














  08/17/23 08/17/23 08/17/23 Range/Units





  04:57 04:57 00:56 


 


WBC   10.0   (4.8-10.8)  x10^3/uL


 


RBC   3.10 L   (4.20-5.40)  10^6/uL


 


Hgb   9.7 L   (12.0-16.0)  g/dL


 


Hct   31.3 L   (37.0-47.0)  %


 


MCV   101.0 H   (81.0-99.0)  fL


 


MCH   31.3 H   (27.0-31.0)  pg


 


MCHC   31.0 L   (32.0-36.0)  g/dL


 


RDW   14.0   (12.0-15.0)  %


 


Plt Count   217   (130-450)  10^3/uL


 


MPV   9.4   (7.9-10.8)  fL


 


Neut # (Auto)   7.8 H   (1.5-6.6)  10^3/uL


 


Lymph # (Auto)   1.3 L   (1.5-3.5)  10^3/uL


 


Mono # (Auto)   0.7   (0.0-1.0)  10^3/uL


 


Eos # (Auto)   0.2   (0.0-0.7)  10^3/uL


 


Baso # (Auto)   0.0   (0.0-0.1)  10^3/uL


 


Absolute Nucleated RBC   0.00   x10^3/uL


 


Nucleated RBC %   0.0   /100WBC


 


VBG pH     (7.31-7.41)  


 


Ionized Calcium     (1.15-1.33)  mmol/L


 


Sodium  136    (135-145)  mmol/L


 


Potassium  4.3   4.0  (3.5-5.0)  mmol/L


 


Chloride  107    (101-111)  mmol/L


 


Carbon Dioxide  21    (21-32)  mmol/L


 


Anion Gap  8.0    (6-13)  


 


BUN  34 H    (6-20)  mg/dL


 


Creatinine  0.9    (0.4-1.0)  mg/dL


 


Estimated GFR (MDRD)  61 L    (>89)  


 


Glucose  126 H    ()  mg/dL


 


Calcium  8.0 L    (8.5-10.3)  mg/dL


 


Phosphorus  2.8    (2.5-4.6)  mg/dL


 


Magnesium  2.0    (1.7-2.8)  mg/dL


 


Total Bilirubin  0.7    (0.2-1.0)  mg/dL


 


AST  55 H    (10-42)  IU/L


 


ALT  17    (10-60)  IU/L


 


Alkaline Phosphatase  66    ()  IU/L


 


Total Protein  6.1 L    (6.7-8.2)  g/dL


 


Albumin  3.1 L    (3.2-5.5)  g/dL


 


Globulin  3.0    (2.1-4.2)  g/dL


 


Albumin/Globulin Ratio  1.0    (1.0-2.2)  


 


Vitamin B12     (180-914)  pg/mL


 


Folate     (5.90 - >24.8)  ng/mL


 


Nasal Screen MRSA (PCR)     (NEGATIVE)  














  08/16/23 08/16/23 08/16/23 Range/Units





  19:26 14:25 14:05 


 


WBC     (4.8-10.8)  x10^3/uL


 


RBC     (4.20-5.40)  10^6/uL


 


Hgb     (12.0-16.0)  g/dL


 


Hct     (37.0-47.0)  %


 


MCV     (81.0-99.0)  fL


 


MCH     (27.0-31.0)  pg


 


MCHC     (32.0-36.0)  g/dL


 


RDW     (12.0-15.0)  %


 


Plt Count     (130-450)  10^3/uL


 


MPV     (7.9-10.8)  fL


 


Neut # (Auto)     (1.5-6.6)  10^3/uL


 


Lymph # (Auto)     (1.5-3.5)  10^3/uL


 


Mono # (Auto)     (0.0-1.0)  10^3/uL


 


Eos # (Auto)     (0.0-0.7)  10^3/uL


 


Baso # (Auto)     (0.0-0.1)  10^3/uL


 


Absolute Nucleated RBC     x10^3/uL


 


Nucleated RBC %     /100WBC


 


VBG pH     (7.31-7.41)  


 


Ionized Calcium     (1.15-1.33)  mmol/L


 


Sodium     (135-145)  mmol/L


 


Potassium  3.6    (3.5-5.0)  mmol/L


 


Chloride     (101-111)  mmol/L


 


Carbon Dioxide     (21-32)  mmol/L


 


Anion Gap     (6-13)  


 


BUN     (6-20)  mg/dL


 


Creatinine     (0.4-1.0)  mg/dL


 


Estimated GFR (MDRD)     (>89)  


 


Glucose     ()  mg/dL


 


Calcium     (8.5-10.3)  mg/dL


 


Phosphorus     (2.5-4.6)  mg/dL


 


Magnesium   2.1   (1.7-2.8)  mg/dL


 


Total Bilirubin     (0.2-1.0)  mg/dL


 


AST     (10-42)  IU/L


 


ALT     (10-60)  IU/L


 


Alkaline Phosphatase     ()  IU/L


 


Total Protein     (6.7-8.2)  g/dL


 


Albumin     (3.2-5.5)  g/dL


 


Globulin     (2.1-4.2)  g/dL


 


Albumin/Globulin Ratio     (1.0-2.2)  


 


Vitamin B12     (180-914)  pg/mL


 


Folate     (5.90 - >24.8)  ng/mL


 


Nasal Screen MRSA (PCR)    NEGATIVE  (NEGATIVE)  














Assessment/Plan





- Problem List


(1) Atrial flutter with rapid ventricular response


Impression: 


The finding of a pneumonia could be the reason the patient went into new atrial 

flutter.


A diltiazem drip was started for rate control, and the patient was admitted to 

the ICU. Upon presentation to the ICU the patient converted spontaneously to NSR

at a rate of 80. She then had short runs of SVT through the night that stopped 

spontaneously.  I kept her on diltiazem overnight 


There is mention of Parkinson's with frequent falls. Therefore I did not start 

anticoagulation and continued the 1 baby aspirin daily, which she is on.


A set of troponins ruled out ACS, as the cause of the new a flutter


Her TSH came back normal at 1.38


Plan:


Will dose the morning beta-blocker and I have increased it from daily to twice 

daily in order to suppress the PSVT's she is still getting


After the a.m. oral dose of beta-blocker, titrate the Cardizem drip to off and 

stop it.


I will keep her in the ICU in case she needs resumption of her diltiazem drip, 

we will move out of ICU to [facility] tomorrow





(2) Pulmonary edema


Conclusion/Plan: 


Her chest x-ray was read as having a probable infiltrate and CHF. She probably 

went into flash pulmonary edema because of sudden rapid HR.


Her periphery however is dehydrated with skin tenting.


He has needed O2 since admission, when her O2 sat dropped to 89%. Now she is on 

2 L via nasal cannula, is saturating at 96%.


Plan:


Because the patient did not have hypoxia, I did not start her on Lasix.  I 

wanted to let the fluids equilibrate.


Follow her BNP daily


Qualifiers: 


   Chronicity: acute   Qualified Code(s): J81.0 - Acute pulmonary edema   





(3) VTach


Conclusion/Plan: 


Patient is having monomorphic V. tach runs, which are asymptomatic (telem was 

reviewed).


Plan:


Assure that her magnesium, potassium and other electrolytes are normal, replace 

if low


Awaiting her Echo result


I have increased her daily Toprol to twice daily both for suppressing SVT/atrial

flutter and it will help the tach as well.





(4) HCAP (healthcare-associated pneumonia)


Conclusion/Plan: 


The patient still does not have a cough but this could be because she was 

intravascularly depleted. WBC was elevated. CXR shows an infiltrate. The patient

lives in a nursing home and therefore she may have a healthcare associated 

pneumonia.


Blood cultures were done and a sputum culture has been ordered.


Plan:


Cont supplemental O2 to keep O2 sats 92% or above


Cont empiric IV cefepime and IV Vanco


Await cx results to tailor antibx





(4) UTI


Conclusion/Plan: 


Her UA was not available until a catheterized specimen was obtained. The urine 

analysis was abnormal with many WBC and many bacteria.


The urine culture is already growing a gram-negative diogo


Plan:


Her empiric IV cefepime covers her gram-negative UTI


Await for identification and sensitivities to tailor antibiotics





(5) Dementia associated with Parkinson's disease


Conclusion/Plan: 


As per history.  Patient has no tremor however she is minimally communicative 

which may be her flat affect.


I reviewed her EMR.  In the [facility] On[facility] note there is mention of Parkinson's 

with frequent falls.


Today more information was obtained from her RN speaking to the , who 

said the patient "can stand up but always ends up falling down".


Plan:


Cont nthe resumed Parkinson's meds 


I will order PT and OT to eval her





(6) Colon cancer


Conclusion/Plan: 


I reviewed her EMR.  The patient is followed in oncology [facility] clinic care.  She 

has colon cancer.





(7) AMS (altered mental status)


Conclusion/Plan: 


IMPROVED


She carries a diagnosis of dementia and Parkinson's  Patient is mostly nonverbal

here, she is in no distress.  She is able to follow directions, was able to get 

up and walk from bed to chair using a walker, leaning forward.  Then she mostly 

was sleeping all day, even when sitting upright in her chair


Plan:


Cont her Parkinson's meds 


Continue to treat her UTI, HCAP and hypoxia
10-Aug-2020 17:18

## 2023-08-23 NOTE — DISCHARGE SUMMARY
Discharge Summary


Admit Date: 08/16/23


Discharge Date: 08/23/23


Discharging Provider: Saumya Bryant Md


Primary Care Provider: Davey Virk


Code Status: Attempt Resuscitation


Condition at Discharge: Stable


Discharge Disposition: 01 Home, Self Care





- DIAGNOSES


Discharge Diagnoses with Status of Each Condition: 





1.  New atrial flutter with RVR


2.  Acute on chronic heart failure with preserved ejection fraction


3.  Flash pulmonary edema


4.  Hypoxia


5.  Healthcare associated pneumonia


6.  UTI due to Klebsiella species


7.  Dementia associated with Parkinson's disease


8.  Colon cancer


9.  Altered mental status


10.  Nonsustained V. tach





- HPI


History of Present Illness: 





This is a 77-year-old  female with a history of being nonverbal.  She 

lives at [facility].  She has a Hx of dementia and Parkinson's 

disease but her baseline function is not known.  She has a DNR status.  The 

 is currently on a vacation in [place]





The patient was found to have a heart rate of 150 today by staff at [facility] and sent to the ER.  The patient is nonverbal and had no complaints. 

She did appear overall fatigued but was in no distress.  ER evaluation showed 

that she was in new onset atrial flutter with a rate of 150.  Our EMR shows that

there is a prior history of A-fib, however she is not on anticoagulants, but is 

on daily baby aspirin and Toprol.  In the ER, patient received diltiazem 10 mg 

IV push which dropped her systolic blood pressure to 90. After it recovered, she

was put on a diltiazem drip.  Her BNP is elevated at 823, the first troponin is 

elevated at 490, her WBC is elevated at 11.4, her CXR shows a probable 

pneumonia. The ED provider reached out to her  who confirmed that the 

goal is comfort care, no heroic measures, no transfer for an angiogram or EP 

procedures. The ED provider spoke to me about this patient and she will be 

admitted to the ICU on diltiazem drip.





- Past Medical History


Cardiovascular: reports: Hypertension, High cholesterol, Atrial fibrillation


Respiratory: reports: None


Neuro: reports: Dementia, Parkinson's


Endocrine/Autoimmune: reports: None


GI: reports: Ulcers


GYN: reports: None


: reports: None


HEENT: reports: Chronic vision loss


Psych: reports: Anxiety


Musculoskeletal: reports: Osteoarthritis, Chronic back pain


Derm: reports: None


MRSA Hx?: No





- Past Surgical History


General: reports: Colonoscopy


Ortho: reports: Hip replacement, Rotator cuff repair








- CONSULTS | PROCEDURES


Procedures: 


Initial chest x-ray with slightly increased perihilar interstitial markings.  

Indicating pulmonary edema.  Repeat chest x-ray 4 days later showed increased in

perihilar markings.  New bilateral pleural effusions.  Superimposed infection 

not clearly delineated.  But possible.





Head CT without acute intracranial abnormality with stable chronic findings of 

hypodensities in the subcortical and periventricular white matter commonly seen 

in setting of chronic microvascular ischemic changes.





Brain MRI no acute intracranial abnormalities.  Age-related volume loss and 

chronic microvascular changes.





Urine culture with Klebsiella pneumonia and Aerococcus urinae








- HOSPITAL COURSE


Hospital Course: 


She is a permanent resident of a local skilled nursing facility, [facility].  For her A-fib with RVR she was transferred to the ICU for diltiazem

drip.  Rate control was achieved.  She was felt to have possible pneumonia on 

chest x-ray.  It was not quite clear so she was treated empirically as pneumonia

as well as a UTI.  She completed her antibiotic regimen.  She had moderate 

encephalopathy when she was admitted and that gradually resolved and cleared by 

the time of discharge.  However she has had a gradual decline due to her chronic

disease status of Parkinson's and has been requiring more and more care.  With 

our physical therapy and Occupational Therapy department she was minimally able 

to comply with therapy.  She does need to be hand fed now.  She is a mod assist 

x1 for bed mobility and mod assist x2 for sit to stand transfers.  Also mod 

assist x2 for stand pivot transfers.  She was able to ambulate 3 feet to a 

chair.  Her goals indicated that she should remain working with PT and OT and 

progress with her ambulation to a goal of 20 feet.  We anticipated functional 

mobility to fluctuate day-to-day due to advanced cognitive impairment with 

varying degrees of participation.  We recommend that she get PT and OT at her [facility].





At discharge temperature was 36.7.  Heart rate 78.  Blood pressure 131/63.  

Nursing reported her is unresponsive but I found her to open her eyes, look at 

me.  Sometimes said yes or no but was mainly nonverbal.  Unable to really 

respond with regards to whether she was oriented or not.  But she did move her 

hands with purposeful movement at times.  She just could not bring food to her 

mouth consistently.  Lungs have diminished breath sounds at the bases.  

Occasionally coarse sounds.  But then they would clear.  No respiratory 

distress.  An irregular rate and rhythm.  And abdomen that was soft, nontender. 

Sacrum was occasionally pink and we were using zinc oxide cream as a barrier.  

She is incontinent of urine and needs a pure wick catheter and pad.





Greater than 30 minutes was spent coordinating discharge





This document was made in part using voice recognition software.  While efforts 

are made to proofread this document, sound alike and grammatical errors may o

ccur.








- ALLERGIES


Allergies/Adverse Reactions: 


                                    Allergies











Allergy/AdvReac Type Severity Reaction Status Date / Time


 


No Known Drug Allergies Allergy   Verified 08/16/23 10:55














- MEDICATIONS


Home Medications: 


                                Ambulatory Orders











 Medication  Instructions  Recorded  Confirmed


 


Carbidopa/Levodopa 25/100 [Sinemet 2 tab PO HS 03/18/15 08/16/23





25 mg/100 mg]   


 


Citalopram [CeleXA] 10 mg PO QPM 03/18/15 08/16/23


 


Carbidopa/Levodopa 25/100 [Sinemet 2 tab PO QID 04/23/19 08/16/23





25 mg/100 mg]   


 


Atorvastatin [Lipitor] 20 mg PO DAILY PM 09/22/21 08/16/23


 


Metoprolol Succinate [Toprol Xl] 25 mg PO DAILY 09/22/21 08/16/23


 


Quetiapine Fumarate [Seroquel] 50 mg PO BID 03/31/22 08/16/23


 


Rivastigmine Tartrate 3 mg PO BID 03/31/22 08/16/23





[Rivastigmine]   


 


Bisacodyl Supp [Dulcolax Supp] 10 mg [place] DAILY PRN 10/26/22 08/16/23


 


Acetaminophen [Tylenol] 650 mg PO Q6H PRN 08/16/23 08/16/23


 


Aspirin [[place] Aspirin] 81 mg PO DAILY 08/16/23 08/16/23


 


Docusate Sodium 100Mg Capsule 200 mg PO BID 08/16/23 08/16/23





[Colace 100Mg Capsule]   


 


Mineral Oil [Mineral Oil Enema] 1 unit WI ONCE PRN 08/16/23 08/16/23


 


Senna [Senokot] 2 tab PO QPM PRN 08/16/23 08/16/23


 


polyethylene glycoL 3350 [Miralax] 17 g PO ONCE PRN 08/16/23 08/16/23














- LABS


Result Diagrams: 


                                 08/23/23 03:24





                                 08/23/23 03:24

## 2023-09-13 ENCOUNTER — HOSPITAL ENCOUNTER (EMERGENCY)
Dept: HOSPITAL 76 - ED | Age: 78
Discharge: HOME | End: 2023-09-13
Payer: MEDICARE

## 2023-09-13 ENCOUNTER — HOSPITAL ENCOUNTER (OUTPATIENT)
Dept: HOSPITAL 76 - EMS | Age: 78
Discharge: ANOTHER HOSPITAL | End: 2023-09-13
Payer: MEDICARE

## 2023-09-13 ENCOUNTER — HOSPITAL ENCOUNTER (OUTPATIENT)
Dept: HOSPITAL 76 - EMS | Age: 78
Discharge: NURSING, REHAB OR LONG-TERM CARE | End: 2023-09-13
Attending: EMERGENCY MEDICINE
Payer: MEDICARE

## 2023-09-13 VITALS — OXYGEN SATURATION: 100 %

## 2023-09-13 VITALS — SYSTOLIC BLOOD PRESSURE: 101 MMHG | DIASTOLIC BLOOD PRESSURE: 53 MMHG

## 2023-09-13 DIAGNOSIS — R53.1: Primary | ICD-10-CM

## 2023-09-13 DIAGNOSIS — I95.9: ICD-10-CM

## 2023-09-13 DIAGNOSIS — R53.83: ICD-10-CM

## 2023-09-13 DIAGNOSIS — F02.80: ICD-10-CM

## 2023-09-13 DIAGNOSIS — R41.0: ICD-10-CM

## 2023-09-13 DIAGNOSIS — I95.9: Primary | ICD-10-CM

## 2023-09-13 DIAGNOSIS — G20: Primary | ICD-10-CM

## 2023-09-13 DIAGNOSIS — I48.91: ICD-10-CM

## 2023-09-13 DIAGNOSIS — F03.90: ICD-10-CM

## 2023-09-13 DIAGNOSIS — I10: ICD-10-CM

## 2023-09-13 PROCEDURE — 99282 EMERGENCY DEPT VISIT SF MDM: CPT

## 2023-09-13 PROCEDURE — 99283 EMERGENCY DEPT VISIT LOW MDM: CPT

## 2023-09-13 NOTE — ED PHYSICIAN DOCUMENTATION
History of Present Illness





- Stated complaint


Stated Complaint: HYPOTENSION





- Chief complaint


Chief Complaint: General





- History obtained from


History obtained from: EMS





- Additonal information


Additional information: 


The patient is sent to the emergency department from [facility] for chief complaint

of hypotension.  The staff at [facility] apparently took the patient's blood 

pressure and found a systolic of 70.  It was taken again and the same results, 

so EMS was called.  The patient had no complaints and was at baseline per staff.

 EMS reports that the patient's blood pressure for them was initially 95 

systolic and then has been over 100 ever since.  The patient has a POLST stating

"comfort measures only and given that her pressures were entirely normal for 

EMS, they did offer to call and talk to the physician on duty for [facility].  

However, staff declined, stating they just wanted the patient transported here. 

The patient has had no complaints in route per EMS.  Her blood pressures have 

all been normal.  The patient states to me that she is just "tired".  She denies

any pain.  No other complaints at this time.








PD PAST MEDICAL HISTORY





- Past Medical History


Cardiovascular: Hypertension, High cholesterol, Atrial fibrillation


Respiratory: None


Neuro: Dementia, Parkinson's


Endocrine/Autoimmune: None


GI: Ulcers


GYN: None


: None


HEENT: Chronic vision loss


Psych: Anxiety


Musculoskeletal: Osteoarthritis, Chronic back pain


Derm: None





- Past Surgical History


Past Surgical History: Yes


General: Colonoscopy


Ortho: Hip replacement, Rotator cuff repair





- Present Medications


Home Medications: 


                                Ambulatory Orders











 Medication  Instructions  Recorded  Confirmed


 


Carbidopa/Levodopa 25/100 [Sinemet 2 tab PO HS 03/18/15 08/16/23





25 mg/100 mg]   


 


Citalopram [CeleXA] 10 mg PO QPM 03/18/15 08/16/23


 


Carbidopa/Levodopa 25/100 [Sinemet 2 tab PO QID 04/23/19 08/16/23





25 mg/100 mg]   


 


Atorvastatin [Lipitor] 20 mg PO DAILY PM 09/22/21 08/16/23


 


Metoprolol Succinate [Toprol Xl] 25 mg PO DAILY 09/22/21 08/16/23


 


Quetiapine Fumarate [Seroquel] 50 mg PO BID 03/31/22 08/16/23


 


Rivastigmine Tartrate 3 mg PO BID 03/31/22 08/16/23





[Rivastigmine]   


 


Bisacodyl Supp [Dulcolax Supp] 10 mg [place] DAILY PRN 10/26/22 08/16/23


 


Acetaminophen [Tylenol] 650 mg PO Q6H PRN 08/16/23 08/16/23


 


Aspirin [[place] Aspirin] 81 mg PO DAILY 08/16/23 08/16/23


 


Docusate Sodium 100Mg Capsule 200 mg PO BID 08/16/23 08/16/23





[Colace 100Mg Capsule]   


 


Mineral Oil [Mineral Oil Enema] 1 unit [place] ONCE PRN 08/16/23 08/16/23


 


Senna [Senokot] 2 tab PO QPM PRN 08/16/23 08/16/23


 


polyethylene glycoL 3350 [Miralax] 17 g PO ONCE PRN 08/16/23 08/16/23














- Allergies


Allergies/Adverse Reactions: 


                                    Allergies











Allergy/AdvReac Type Severity Reaction Status Date / Time


 


No Known Drug Allergies Allergy   Verified 08/16/23 10:55














- Social History


Does the pt smoke?: No


Smoking Status: Unknown if ever smoked


Does the pt drink ETOH?: No


Does the pt have substance abuse?: Yes





- Immunizations


Immunizations are current?: Yes





- POLST


Patient has POLST: No





PD ED PE NORMAL





- Vitals


Vital signs reviewed: Yes





- General


General: No acute distress, Well developed/nourished, Other (Awake, answering 

questions appropriately)





- HEENT


HEENT: Atraumatic, PERRL, EOMI, Moist mucous membranes





- Neck


Neck: Supple, no meningeal sign





- Cardiac


Cardiac: RRR, No murmur, Strong equal pulses





- Respiratory


Respiratory: No respiratory distress, Clear bilaterally





- Abdomen


Abdomen: Soft, Non tender, Non distended





- Derm


Derm: Normal color, Warm and dry, No rash





- Extremities


Extremities: No deformity





- Neuro


Neuro: Alert and oriented X 3





- Psych


Psych: Normal mood, Normal affect





Results





- Vitals


Vitals: 





                               Vital Signs - 24 hr











  09/13/23





  08:59


 


Temperature 36.2 C L


 


Heart Rate 76


 


Respiratory 14





Rate 


 


Blood Pressure 101/53 L


 


O2 Saturation 98








                                     Oxygen











O2 Source [With Activity]      Nasal cannula


 


O2 Source [Without Activity]   Nasal cannula


 


O2 Source                      Room air

















PD Medical Decision Making





- ED course


Complexity details: considered differential, d/w patient


ED course: 


The patient had normal blood pressures repeatedly in the emergency department.  

All of her vital signs were normal and she had been reported to be at baseline 

at her care home.  The patient desires only comfort care and I did not Feel that

emergent work-up was indicated anyway, with no evidence of anything abnormal, 

but especially, given that the patient wants to simply be comfortable.  As such,

given that the medical screening exam revealed no emergency, the patient was 

deemed stable for discharge back to [facility].








Departure





- Departure


Disposition: 01 Home, Self Care


Clinical Impression: 


 Normal exam





Dementia


Qualifiers:


 Dementia type: unspecified type Dementia severity: moderate Dementia behavioral

or psychological symptom: without behavioral, psychotic, or mood disturbance or 

anxiety Qualified Code(s): F03.B0 - Unspecified dementia, moderate, without 

behavioral disturbance, psychotic disturbance, mood disturbance, and anxiety





Condition: Stable


Instructions:  ED Dementia Caregiver Support


Comments: 


Mya has had a medical screening exam in the emergency department, and no 

emergent condition has been identified.  She has had entirely normal vital signs

not only here in the ED, but during the entire transport prior.  She has no 

complaints and no physical exam findings to indicate an emergent condition.  

There is no justification for emergent work-up and furthermore, the patient 

prefers comfort care as stated by her POLST.  Please have her follow-up with her

primary doctor for further concerns.

## 2024-12-11 NOTE — CT REPORT
PROCEDURE:  CERVICAL SPINE WO

 

INDICATIONS:  fall, neck pain

 

TECHNIQUE:  

Noncontrast 3 mm thick sections acquired from the skull base to the T4 level.  Sagittal and coronal r
eformats were then constructed.  For radiation dose reduction, the following was used:  automated exp
osure control, adjustment of mA and/or kV according to patient size.

 

COMPARISON:  4/24/2022.

 

FINDINGS:  

Image quality:  Excellent.  

 

Bones:  No acute fractures or traumatic dislocations.  No acute compression fractures. Moderate multi
level cervical spondylosis is again noted. Findings are most pronounced at C5-6 and C6-7. There are m
oderate degenerative endplate changes with disc space loss and endplate osteophyte formation. Multile
patrick facet arthropathy. Visualized superior ribs are intact.  

 

Soft tissues:  Prevertebral soft tissues are normal in thickness.  No paravertebral hematomas.  No ap
ical pneumothoraces.  Atherosclerotic calcifications are seen throughout the neck.

 

IMPRESSION:  

CT cervical spine without acute fracture or traumatic malalignment.

 

Multilevel cervical spondylosis most severe at C5-6 and C6-7.

 

Reviewed by: Sang Patricia MD on 6/1/2022 7:28 PM PDT

Approved by: Sang Patricia MD on 6/1/2022 7:28 PM PDT

 

 

Station ID:  [id number]
PROCEDURE:  HEAD WO

 

INDICATIONS:  fall, head injury

 

TECHNIQUE:  

Noncontrast 4.5 mm thick angled axial sections acquired from the foramen magnum to the vertex.  For r
adiation dose reduction, the following was used:  automated exposure control, adjustment of mA and/or
 kV according to patient size.

 

COMPARISON:  None.

 

FINDINGS:  

Image quality:  Excellent.  

 

CSF spaces:  Basal cisterns are patent.  No extra-axial fluid collections.  Ventricles are normal in 
size and shape.  

 

Brain:  No midline shift.  No intracranial masses or hemorrhage.  Gray-white matter interface is norm
al.  

 

Skull and face:  Calvarium and visualized facial bones are intact, without suspicious lesions.  

 

Sinuses: There is layering fluid in the bilateral maxillary sinuses. Bilateral ethmoid sinus mucosal 
thickening. There is mild soft tissue swelling overlying the nasal bones. Remainder the visualized pa
ranasal sinuses and mastoids are clear.  

 

IMPRESSION:  CT head without acute intracranial abnormalities. No acute calvarial fractures seen. Sof
t tissue swelling overlying the bridge of the nose better evaluated on dedicated CT of the facial bon
es. Please see separate report for details.

 

Small amount of layering fluid in the bilateral maxillary sinuses and scattered ethmoid sinus mucosal
 thickening.

 

Reviewed by: Sang Patricia MD on 6/1/2022 7:24 PM PDT

Approved by: Sang Patricia MD on 6/1/2022 7:24 PM PDT

 

 

Station ID:  [id number]
PROCEDURE:  MAXILLOFACIAL WO

 

INDICATIONS:  fall, facial injury

 

TECHNIQUE:  

Noncontrast 1.5 mm thick axial images acquired from the mandible through the frontal sinuses, with co
maylin and sagittal reformatting.  For radiation dose reduction, the following was used:  automated ex
posure control, adjustment of mA and/or kV according to patient size.

 

COMPARISON:  None.

 

FINDINGS:  

Image quality:  Excellent.  

 

Bones and teeth:  Orbital walls are intact.  Sinus walls show no fracture or deformity. Osseous nasal
 septum appears intact. Minimally displaced bilateral nasal bone fractures. Overlying soft tissue ryna
ma. Visualized portions of the mandible demonstrate no fractures or subluxation.  Zygomatic arches ar
e intact.  Pterygoid plates are intact.  Visualized portions of the skull base and auditory canals ar
e intact.  

 

Sinuses: Scattered bilateral maxillary sinus mucosal thickening. Scattered ethmoid sinus mucosal thic
kening. Remaining paranasal sinuses appear clear. Mastoid air cells are aerated.  

 

Soft tissues:  Soft tissue swelling overlying the nose. Otherwise, no edema, masses, or fluid collect
ions.  No enlarged lymph nodes.  No soft tissue lacerations or debris.  

 

Vascular:  Visualized vascular structures appear normal in the absence of contrast.  Bony vascular fo
ramina and canals are intact.  

 

IMPRESSION:  

 

Minimally displaced bilateral nasal bone fractures with overlying soft tissue edema.

 

Scattered ethmoid sinus and bilateral maxillary sinus disease.

 

Reviewed by: Sang Patricia MD on 6/1/2022 7:31 PM PDT

Approved by: Sang Patricia MD on 6/1/2022 7:31 PM PDT

 

 

Station ID:  [id number]
No

## 2024-12-17 NOTE — ED PHYSICIAN DOCUMENTATION
History of Present Illness





- Stated complaint


Stated Complaint: WEAKNESS





- Chief complaint


Chief Complaint: Neuro





- History obtained from


History obtained from: Patient, EMS





- Additonal information


Additional information: 





Pt is brought to the emergency dept by EMS after having a syncopal/near-syncopal

episode at her assisted living facility, with report of seizure-like activity by

staff.  No report of how long it lasted.  Pt had not been ill to anyone's 

knowledge.  Pt denies any chest pain or SOB.  No known h/o seizures.  No recent 

head injury.  Pt has a h/o Parkinson's disease.  No complaints at this time.





Review of Systems


Ten Systems: 10 systems reviewed and negative


Constitutional: reports: Reviewed and negative


Eyes: reports: Reviewed and negative


Ears: reports: Reviewed and negative


Nose: reports: Reviewed and negative


Throat: reports: Reviewed and negative


Cardiac: reports: Reviewed and negative


Respiratory: reports: Reviewed and negative


GI: reports: Reviewed and negative


: reports: Reviewed and negative


Skin: reports: Reviewed and negative


Musculoskeletal: reports: Reviewed and negative


Neurologic: reports: Syncope, Seizure


Psychiatric: reports: Reviewed and negative


Endocrine: reports: Reviewed and negative


Immunocompromised: reports: Reviewed and negative





PD PAST MEDICAL HISTORY





- Past Medical History


Cardiovascular: Hypertension, High cholesterol


Respiratory: None


Neuro: Parkinson's


Endocrine/Autoimmune: None


GI: Ulcers


GYN: None


: None


HEENT: Chronic vision loss


Psych: None


Musculoskeletal: Osteoarthritis, Chronic back pain


Derm: None





- Past Surgical History


Past Surgical History: Yes


General: Colonoscopy


Ortho: Hip replacement, Rotator cuff repair





- Present Medications


Home Medications: 


                                Ambulatory Orders











 Medication  Instructions  Recorded  Confirmed


 


Carbidopa/Levodopa 25/100 [Sinemet 2 tab PO [id number] 03/18/15 02/10/22





25 mg/100 mg]   


 


Citalopram [CeleXA] 10 mg PO QPM 03/18/15 02/10/22


 


Carbidopa/Levodopa 25/100 [Sinemet 5 tab PO 1330,1730,2100 04/23/19 02/10/22





25 mg/100 mg]   


 


Aspirin [Sargent Aspirin] 81 mg PO DAILY 09/22/21 02/10/22


 


Atorvastatin [Lipitor] 20 mg PO DAILY 09/22/21 02/10/22


 


Metoprolol Succinate [Toprol Xl] 25 mg PO DAILY 09/22/21 02/10/22


 


Acetaminophen [Tylenol] 650 mg PO Q8H PRN #30 tab 10/08/21 02/10/22














- Allergies


Allergies/Adverse Reactions: 


                                    Allergies











Allergy/AdvReac Type Severity Reaction Status Date / Time


 


No Known Drug Allergies Allergy   Verified 02/10/22 12:42














- Social History


Does the pt smoke?: No


Smoking Status: Never smoker


Does the pt drink ETOH?: No


Does the pt have substance abuse?: Yes





- Immunizations


Immunizations are current?: Yes





- POLST


Patient has POLST: No





PD ED PE NORMAL





- Vitals


Vital signs reviewed: Yes





- General


General: No acute distress, Well developed/nourished, Other (Pt is awake, but 

appears generally weak.  )





- HEENT


HEENT: Atraumatic, PERRL, EOMI, Moist mucous membranes





- Neck


Neck: Supple, no meningeal sign





- Cardiac


Cardiac: RRR, No murmur





- Respiratory


Respiratory: No respiratory distress, Clear bilaterally





- Abdomen


Abdomen: Soft, Non tender, Non distended





- Back


Back: No CVA TTP, No spinal TTP





- Derm


Derm: Normal color, Warm and dry, No rash





- Extremities


Extremities: No deformity, No edema, No calf tenderness / cord





- Neuro


Neuro: Other (No focal deficits.  Awake, answers questions, but responses are 

mildly slowed.  No aphasia)





- Psych


Psych: Normal mood, Normal affect





Results





- Vitals


Vitals: 


                               Vital Signs - 24 hr











  02/10/22 02/10/22 02/10/22





  12:37 13:33 14:46


 


Temperature 36.8 C  36.8 C


 


Heart Rate 64 69 64


 


Respiratory 13 15 





Rate   


 


Blood Pressure 125/46 L  125/61


 


O2 Saturation 100 100 100














  02/10/22 02/10/22





  15:04 17:40


 


Temperature  


 


Heart Rate 65 80


 


Respiratory 16 18





Rate  


 


Blood Pressure 134/75 H 124/80


 


O2 Saturation 98 100








                                     Oxygen











O2 Source []                   Room air


 


O2 Source []                   Room air


 


O2 Source                      Room air

















- Labs


Labs: 


                                Laboratory Tests











  02/10/22 02/10/22 02/10/22





  13:02 13:02 15:19


 


WBC  5.7  


 


RBC  3.53 L  


 


Hgb  10.9 L  


 


Hct  33.5 L  


 


MCV  94.9  


 


MCH  30.9  


 


MCHC  32.5  


 


RDW  14.0  


 


Plt Count  209  


 


MPV  8.6  


 


Neut # (Auto)  4.1  


 


Lymph # (Auto)  0.8 L  


 


Mono # (Auto)  0.5  


 


Eos # (Auto)  0.3  


 


Baso # (Auto)  0.1  


 


Absolute Nucleated RBC  0.00  


 


Nucleated RBC %  0.0  


 


Sodium   138 


 


Potassium   4.5 


 


Chloride   103 


 


Carbon Dioxide   28 


 


Anion Gap   7.0 


 


BUN   18 


 


Creatinine   0.8 


 


Estimated GFR (MDRD)   70 L 


 


Glucose   129 H 


 


Calcium   8.2 L 


 


Total Bilirubin   0.6 


 


AST   12 


 


ALT   < 10 L 


 


Alkaline Phosphatase   81 


 


Total Protein   5.8 L 


 


Albumin   3.2 


 


Globulin   2.6 


 


Albumin/Globulin Ratio   1.2 


 


Lipase   31 


 


Urine Color    YELLOW


 


Urine Clarity    CLEAR


 


Urine pH    7.0


 


Ur Specific Gravity    1.020


 


Urine Protein    NEGATIVE


 


Urine Glucose (UA)    NEGATIVE


 


Urine Ketones    NEGATIVE


 


Urine Occult Blood    NEGATIVE


 


Urine Nitrite    NEGATIVE


 


Urine Bilirubin    NEGATIVE


 


Urine Urobilinogen    1 (NORMAL)


 


Ur Leukocyte Esterase    SMALL H


 


Urine RBC    0-5


 


Urine WBC    6-10 H


 


Ur Squamous Epith Cells    MANY Squamous H


 


Urine Bacteria    Many H


 


Ur Microscopic Review    INDICATED


 


Urine Culture Comments    NOT INDICATED














- Rads (name of study)


  ** CT head


Radiology: Final report received, EMP read indepedently, See rad report (nad)





PD MEDICAL DECISION MAKING





- ED course


Complexity details: reviewed results, re-evaluated patient, considered 

differential, d/w patient, d/w family


ED course: 





Pt was given 2 liters of NS before producing urine.  On re-evaluation, she was 

alert and appeared much better than on arrival.  CT scan of the head was non-

acute.  Her EKG was also unremarkable.  UA did not show infection.  Pt was mo

derately anemic, and mildly hyperglycemic.  Remainder of labs were unremarkable.

  I discussed with pt and  that most likely, pt had a syncopal episode 

with myoclonic jerks, given that she was significantly dehydrated.   

states dehydration has been an issue repeatedly for the pt.  However, if pt has 

further episodes that are possible seizures, she should follow up with her PCP 

to arrange further specialty evaluation.





Departure





- Departure


Disposition: 01 Home, Self Care


Clinical Impression: 


 Dehydration





Condition: Stable


Instructions:  ED Dehydration


Comments: 


Your labs, head CT, and urinalysis do not show any evidence of serious 

condition.  However, you were found to be quite dehydrated today, and it is very

 important that you drink plenty of fluids.  You should aim to have 8 cups of 

water every day to keep yourself properly hydrated.  It is not clear whether 

there was actually seizure versus simply a fainting episode with some muscle 

jerks, which can also happen.  Dehydration can make any person much more prone 

to fainting.  Please follow-up with your primary doctor for further evaluation 

if you have any more symptoms like this.


Discharge Date/Time: 02/10/22 17:00 98